# Patient Record
Sex: FEMALE | NOT HISPANIC OR LATINO | Employment: OTHER | URBAN - METROPOLITAN AREA
[De-identification: names, ages, dates, MRNs, and addresses within clinical notes are randomized per-mention and may not be internally consistent; named-entity substitution may affect disease eponyms.]

---

## 2017-01-30 ENCOUNTER — TELEPHONE (OUTPATIENT)
Dept: INTERNAL MEDICINE | Facility: MEDICAL CENTER | Age: 67
End: 2017-01-30

## 2017-01-30 DIAGNOSIS — E11.9 DIABETES MELLITUS WITHOUT COMPLICATION (HCC): ICD-10-CM

## 2017-01-30 NOTE — TELEPHONE ENCOUNTER
----- Message from Marilee Pike sent at 1/30/2017 11:47 AM PST -----  Regarding: Lanre pt  Contact: [phone]  Needs a Rx for a glucose monitor/test strips/ lancets to test daily blood sugar levels. Dr. De Dios asked her to monitor her sugar levels but never prescribed her a machine to test. She uses [facility] on [address]/ [place].

## 2017-03-07 ENCOUNTER — TELEPHONE (OUTPATIENT)
Dept: INTERNAL MEDICINE | Facility: MEDICAL CENTER | Age: 67
End: 2017-03-07

## 2017-03-07 DIAGNOSIS — E11.9 DIABETES MELLITUS WITHOUT COMPLICATION (HCC): ICD-10-CM

## 2017-03-07 NOTE — TELEPHONE ENCOUNTER
----- Message from Shama Beckwith sent at 3/7/2017 10:54 AM PST -----  Regarding: Dr De Dios Pt; EEG Order and Diabetic Machine and Supplies  Contact: [phone]  1. Caller Name: Tonya                    2. Call Back Number: [phone]    3. Patient PCP: Dr De Dios    4. What is the patient requesting: Pt states that at her last visit in Nov Dr De Dios wanted her to have an EEG but there is no order. Also she was diagnosed as being diabetic and was told her machine and supplies would be sent to [facility] but they never were. She was supposed to log her levels. Please verify and call pt. She has an appt on 5/3.     5. Does patient need immediate contact: No

## 2017-03-07 NOTE — TELEPHONE ENCOUNTER
Looks like patient had blood pressure monitoring kit sent to pharmacy 1 month ago. Re-sent another prescription

## 2017-03-13 RX ORDER — TRAZODONE HYDROCHLORIDE 50 MG/1
TABLET ORAL
Qty: 30 TAB | Refills: 0 | Status: SHIPPED | OUTPATIENT
Start: 2017-03-13 | End: 2017-04-08 | Stop reason: SDUPTHER

## 2017-03-13 NOTE — TELEPHONE ENCOUNTER
Last seen: 11/10/16 by Dr. De Dios  Next appt: 05/03/17 with Dr. De Dios    Was the patient seen in the last year in this department? Yes   Does patient have an active prescription for medications requested? No   Received Request Via: Pharmacy

## 2017-04-03 NOTE — TELEPHONE ENCOUNTER
Last seen: 11/10/16 by Dr. De Dios  Next appt: 5/3/17 with Dr. De Dios    Was the patient seen in the last year in this department? Yes   Does patient have an active prescription for medications requested? No   Received Request Via: Pharmacy

## 2017-04-04 RX ORDER — OMEPRAZOLE 40 MG/1
40 CAPSULE, DELAYED RELEASE ORAL DAILY
Qty: 90 CAP | Refills: 1 | Status: SHIPPED | OUTPATIENT
Start: 2017-04-04 | End: 2017-05-02 | Stop reason: SDUPTHER

## 2017-04-24 NOTE — TELEPHONE ENCOUNTER
Last seen: 11/10/16 by Dr. De Dios  Next appt: 05/01/17 with Dr. De Dios    Was the patient seen in the last year in this department? Yes   Does patient have an active prescription for medications requested? No   Received Request Via: Pharmacy

## 2017-05-01 ENCOUNTER — OFFICE VISIT (OUTPATIENT)
Dept: INTERNAL MEDICINE | Facility: MEDICAL CENTER | Age: 67
End: 2017-05-01
Payer: MEDICARE

## 2017-05-01 VITALS
OXYGEN SATURATION: 96 % | TEMPERATURE: 98.5 F | BODY MASS INDEX: 26.98 KG/M2 | HEART RATE: 84 BPM | HEIGHT: 64 IN | RESPIRATION RATE: 20 BRPM | SYSTOLIC BLOOD PRESSURE: 140 MMHG | WEIGHT: 158 LBS | DIASTOLIC BLOOD PRESSURE: 80 MMHG

## 2017-05-01 DIAGNOSIS — F51.01 PRIMARY INSOMNIA: ICD-10-CM

## 2017-05-01 DIAGNOSIS — E11.9 TYPE 2 DIABETES MELLITUS WITHOUT COMPLICATION, WITHOUT LONG-TERM CURRENT USE OF INSULIN (HCC): ICD-10-CM

## 2017-05-01 DIAGNOSIS — E03.9 HYPOTHYROIDISM, UNSPECIFIED TYPE: ICD-10-CM

## 2017-05-01 DIAGNOSIS — E78.5 DYSLIPIDEMIA: ICD-10-CM

## 2017-05-01 DIAGNOSIS — G43.909 MIGRAINE WITHOUT STATUS MIGRAINOSUS, NOT INTRACTABLE, UNSPECIFIED MIGRAINE TYPE: ICD-10-CM

## 2017-05-01 DIAGNOSIS — I10 ESSENTIAL HYPERTENSION: ICD-10-CM

## 2017-05-01 DIAGNOSIS — K21.9 GASTROESOPHAGEAL REFLUX DISEASE WITHOUT ESOPHAGITIS: ICD-10-CM

## 2017-05-01 PROCEDURE — 99214 OFFICE O/P EST MOD 30 MIN: CPT | Mod: GC | Performed by: INTERNAL MEDICINE

## 2017-05-01 PROCEDURE — 1036F TOBACCO NON-USER: CPT | Mod: GC | Performed by: INTERNAL MEDICINE

## 2017-05-01 PROCEDURE — G8417 CALC BMI ABV UP PARAM F/U: HCPCS | Mod: GC | Performed by: INTERNAL MEDICINE

## 2017-05-01 PROCEDURE — 3014F SCREEN MAMMO DOC REV: CPT | Mod: 8P,GC | Performed by: INTERNAL MEDICINE

## 2017-05-01 PROCEDURE — 3046F HEMOGLOBIN A1C LEVEL >9.0%: CPT | Mod: 8P,GC | Performed by: INTERNAL MEDICINE

## 2017-05-01 PROCEDURE — 4040F PNEUMOC VAC/ADMIN/RCVD: CPT | Mod: GC | Performed by: INTERNAL MEDICINE

## 2017-05-01 PROCEDURE — 3017F COLORECTAL CA SCREEN DOC REV: CPT | Mod: GC | Performed by: INTERNAL MEDICINE

## 2017-05-01 PROCEDURE — G8432 DEP SCR NOT DOC, RNG: HCPCS | Mod: GC | Performed by: INTERNAL MEDICINE

## 2017-05-01 PROCEDURE — 1101F PT FALLS ASSESS-DOCD LE1/YR: CPT | Mod: GC | Performed by: INTERNAL MEDICINE

## 2017-05-01 ASSESSMENT — PATIENT HEALTH QUESTIONNAIRE - PHQ9: CLINICAL INTERPRETATION OF PHQ2 SCORE: 0

## 2017-05-01 NOTE — MR AVS SNAPSHOT
"Antonietazabejuliet Estes   2017 3:00 PM   Office Visit   MRN: [id number]    Department:  [facility] Med - Internal Med   Dept Phone:  [phone]    Description:  Female : 1950   Provider:  Chad De Dios M.D.           Reason for Visit     Cough refills, cough at night worsen, difficulty breathing, rash neck, arms.       Allergies as of 2017     No Known Allergies      You were diagnosed with     DM (diabetes mellitus screen)   [915069]       Essential hypertension   [6599459]       Dyslipidemia   [673098]         Vital Signs     Blood Pressure Pulse Temperature Respirations Height Weight    140/80 mmHg 84 36.9 °C (98.5 °F) 20 1.626 m (5' 4\") 71.668 kg (158 lb)    Body Mass Index Oxygen Saturation Smoking Status             27.11 kg/m2 96% Never Smoker          Basic Information     Date Of Birth Sex Race Ethnicity Preferred Language    1950 Female  or  Non- English      Your appointments     May 03, 2017 10:00 AM   Established Patient with Chad De Dios M.D.   [facility] Med - Internal Medicine (--)    [address] [id number] [phone]           You will be receiving a confirmation call a few days before your appointment from our automated call confirmation system.              Problem List              ICD-10-CM Priority Class Noted - Resolved    Retinal detachment H33.20   2013 - Present      Health Maintenance        Date Due Completion Dates    DIABETES MONOFILAMENT / LE EXAM 1950 ---    RETINAL SCREENING 1966 ---    IMM DTaP/Tdap/Td Vaccine (1 - Tdap) 1967 ---    PAP SMEAR 1969 ---    MAMMOGRAM 1988 ---    IMM ZOSTER VACCINE 2008 ---    BONE DENSITY 2013 ---    FASTING LIPID PROFILE 8/10/2014 8/10/2013    A1C SCREENING 3/15/2017 9/15/2016, 2013, 8/10/2013    URINE ACR / MICROALBUMIN 9/15/2017 9/15/2016    SERUM CREATININE 9/15/2017 9/15/2016, 3/18/2015, 8/10/2013, 2013    IMM PNEUMOCOCCAL " 65+ (ADULT) LOW/MEDIUM RISK SERIES (2 of 2 - PPSV23) 11/10/2017 11/10/2016    COLONOSCOPY 10/20/2019 10/20/2009            Current Immunizations     13-VALENT PCV PREVNAR 11/10/2016  2:08 PM    INFLUENZA VACCINE H1N1 7/27/2010    Influenza Vaccine Adult HD 10/10/2016    Influenza Vaccine Quad Inj (Pf) 11/18/2015, 10/7/2014    Influenza Vaccine Quad Inj (Preserved) 11/7/2013, 10/7/2010      Below and/or attached are the medications your provider expects you to take. Review all of your home medications and newly ordered medications with your provider and/or pharmacist. Follow medication instructions as directed by your provider and/or pharmacist. Please keep your medication list with you and share with your provider. Update the information when medications are discontinued, doses are changed, or new medications (including over-the-counter products) are added; and carry medication information at all times in the event of emergency situations     Allergies:  No Known Allergies          Medications  Valid as of: May 01, 2017 -  3:46 PM    Generic Name Brand Name Tablet Size Instructions for use    Blood Glucose Monitoring Suppl (Device) Blood Glucose Monitoring Suppl  Meter: Freestyle glucometer. Sig. Use as directed for blood sugar monitoring twice daily.        Blood Glucose Monitoring Suppl (Misc) Blood Glucose Monitoring Suppl SUPPLIES Test strips order: Test strips for Abbott Freestyle Lite meter. Sig: use bid and prn ssx high or low sugar        Blood Glucose Monitoring Suppl (Misc) Blood Glucose Monitoring Suppl SUPPLIES Lancets order: Lancets for Abbott Freestyle Lite meter. Sig: use bid and prn ssx high or low sugar        Blood Glucose Monitoring Suppl (Misc) Blood Glucose Monitoring Suppl SUPPLIES Test strips order: Test strips  for One Touch Ultra 2 meter. Sig: check blood sugars twice daily DX:E11.9        Blood Glucose Monitoring Suppl (Device) Blood Glucose Monitoring Suppl  Meter: Dispense One touch Ultra  2 . Sig. Check blood sugars twice daily DX:E11.9        Blood Glucose Monitoring Suppl (Misc) Blood Glucose Monitoring Suppl SUPPLIES Lancets order: Lancets  strips for One Touch Ultra 2 meter. Sig: check blood sugars twice daily        Blood Glucose Monitoring Suppl (Kit) FREESTYLE  1 Each by Does not apply route Once for 1 dose.        Cimetidine (Tab) TAGAMET 800 MG Take 400 mg by mouth every day.        Ibuprofen (Tab) MOTRIN 600 MG Take 1 Tab by mouth every 8 hours as needed.        Levothyroxine Sodium (Tab) SYNTHROID 50 MCG TAKE 1 TAB BY MOUTH EVERY DAY.        Levothyroxine Sodium (Tab) SYNTHROID 50 MCG Take 1 Tab by mouth every day.        Losartan Potassium-HCTZ (Tab) HYZAAR 50-12.5 MG Take 1 Tab by mouth every day.        Lovastatin (Tab) MEVACOR 20 MG Take 20 mg by mouth every evening.        MetFORMIN HCl (Tab) GLUCOPHAGE 1000 MG Take 1 Tab by mouth 2 times a day. 0.5 TAB DAILY        MetFORMIN HCl (Tab) GLUCOPHAGE 1000 MG TAKE 1 TAB BY MOUTH 2 TIMES A DAY.        MetFORMIN HCl (Tab) GLUCOPHAGE 1000 MG TAKE 1 TAB BY MOUTH 2 TIMES A DAY. 0.5 TAB DAILY        Montelukast Sodium (Tab) SINGULAIR 10 MG Take 10 mg by mouth every day.        Multiple Vitamin (Tab) THERAGRAN  Take 1 Tab by mouth every day.          Omeprazole (CAPSULE DELAYED RELEASE) PRILOSEC 40 MG TAKE 1 CAPSULE BY MOUTH EVERY MORNING, 30 MINUTES BEFORE 1ST MEAL OF DAY        Omeprazole (CAPSULE DELAYED RELEASE) PRILOSEC 40 MG Take 1 Cap by mouth every day.        PrednisoLONE Acetate (Suspension) PRED FORTE 1 % INSTILL 1 DROP INTO BOTH EYES 4 TIMES A DAY AS DIRECTED        Propranolol HCl (Tab) INDERAL 40 MG TAKE 1 TABLET BY ORAL ROUTE 2 TIMES EVERY DAY        SUMAtriptan Succinate (Tab) IMITREX 100 MG Take 1 Tab by mouth Once PRN for Migraine for up to 1 dose.        TraZODone HCl (Tab) DESYREL 50 MG TAKE 1 TAB BY MOUTH 1 TIME DAILY AS NEEDED FOR SLEEP (AT NIGHT).        .                 Medicines prescribed today were sent to:      [id number] - [place], [place] - [address]    [address] [place] [address]    Phone: [phone] Fax: [phone]    Open 24 Hours?: No      Medication refill instructions:       If your prescription bottle indicates you have medication refills left, it is not necessary to call your provider’s office. Please contact your pharmacy and they will refill your medication.    If your prescription bottle indicates you do not have any refills left, you may request refills at any time through one of the following ways: The online Seventymm system (except Urgent Care), by calling your provider’s office, or by asking your pharmacy to contact your provider’s office with a refill request. Medication refills are processed only during regular business hours and may not be available until the next business day. Your provider may request additional information or to have a follow-up visit with you prior to refilling your medication.   *Please Note: Medication refills are assigned a new Rx number when refilled electronically. Your pharmacy may indicate that no refills were authorized even though a new prescription for the same medication is available at the pharmacy. Please request the medicine by name with the pharmacy before contacting your provider for a refill.        Your To Do List     Future Labs/Procedures Complete By Expires    CBC WITH DIFFERENTIAL  As directed 5/1/2018    COMP METABOLIC PANEL (For Serum Creatinine Topic, choose 1 only)  As directed 5/1/2018    HEMOGLOBIN A1C (For A1C Every 6 Months Topic)  As directed 5/1/2018    Comments:    HEMOGLOBIN A1C   [unfilled]    LIPID PROFILE  As directed 5/1/2018         Seventymm Access Code: [email or web address]  Expires: 5/31/2017  3:46 PM    Seventymm  A secure, online tool to manage your health information     [email or web address]’s Seventymm® is a secure, online tool that connects you to your personalized health information from the privacy of your home -- day or night -  making it very easy for you to manage your healthcare. Once the activation process is completed, you can even access your medical information using the iCrimefighter bean, which is available for free in the Apple Bean store or Google Play store.     iCrimefighter provides the following levels of access (as shown below):   My Chart Features   [facility] Doctor [facility]  Specialists [facility]  Urgent  Care [facility]  Primary Care  Doctor   Email your healthcare team securely and privately 24/7 X X X    Manage appointments: schedule your next appointment; view details of past/upcoming appointments X      Request prescription refills. X      View recent personal medical records, including lab and immunizations X X X X   View health record, including health history, allergies, medications X X X X   Read reports about your outpatient visits, procedures, consult and ER notes X X X X   See your discharge summary, which is a recap of your hospital and/or ER visit that includes your diagnosis, lab results, and care plan. X X       How to register for iCrimefighter:  1. Go to  [email or web address].  2. Click on the Sign Up Now box, which takes you to the New Member Sign Up page. You will need to provide the following information:  a. Enter your iCrimefighter Access Code exactly as it appears at the top of this page. (You will not need to use this code after you’ve completed the sign-up process. If you do not sign up before the expiration date, you must request a new code.)   b. Enter your date of birth.   c. Enter your home email address.   d. Click Submit, and follow the next screen’s instructions.  3. Create a iCrimefighter ID. This will be your iCrimefighter login ID and cannot be changed, so think of one that is secure and easy to remember.  4. Create a iCrimefighter password. You can change your password at any time.  5. Enter your Password Reset Question and Answer. This can be used at a later time if you forget your password.   6. Enter your e-mail  address. This allows you to receive e-mail notifications when new information is available in [employer].  7. Click Sign Up. You can now view your health information.    For assistance activating your [employer] account, call [phone]

## 2017-05-02 PROBLEM — E03.9 HYPOTHYROIDISM: Status: ACTIVE | Noted: 2017-05-02

## 2017-05-02 PROBLEM — K21.9 GERD (GASTROESOPHAGEAL REFLUX DISEASE): Status: ACTIVE | Noted: 2017-05-02

## 2017-05-02 PROBLEM — E78.5 DYSLIPIDEMIA: Status: ACTIVE | Noted: 2017-05-02

## 2017-05-02 PROBLEM — I10 ESSENTIAL HYPERTENSION: Status: ACTIVE | Noted: 2017-05-02

## 2017-05-02 PROBLEM — Z13.1 DM (DIABETES MELLITUS SCREEN): Status: ACTIVE | Noted: 2017-05-02

## 2017-05-02 PROBLEM — F51.01 PRIMARY INSOMNIA: Status: ACTIVE | Noted: 2017-05-02

## 2017-05-02 PROBLEM — G43.909 MIGRAINE WITHOUT STATUS MIGRAINOSUS, NOT INTRACTABLE: Status: ACTIVE | Noted: 2017-05-02

## 2017-05-02 RX ORDER — OMEPRAZOLE 40 MG/1
40 CAPSULE, DELAYED RELEASE ORAL DAILY
Qty: 90 CAP | Refills: 1 | Status: SHIPPED | OUTPATIENT
Start: 2017-05-02 | End: 2017-07-21

## 2017-05-02 RX ORDER — PROPRANOLOL HYDROCHLORIDE 40 MG/1
40 TABLET ORAL DAILY
Qty: 90 TAB | Refills: 6 | Status: SHIPPED | OUTPATIENT
Start: 2017-05-02 | End: 2017-07-21 | Stop reason: SDUPTHER

## 2017-05-02 RX ORDER — SUMATRIPTAN 100 MG/1
100 TABLET, FILM COATED ORAL
Qty: 10 TAB | Refills: 3 | Status: SHIPPED | OUTPATIENT
Start: 2017-05-02 | End: 2017-07-21 | Stop reason: SDUPTHER

## 2017-05-02 ASSESSMENT — ENCOUNTER SYMPTOMS
TINGLING: 0
CHILLS: 0
BLURRED VISION: 0
HEMOPTYSIS: 0
PALPITATIONS: 0
DIZZINESS: 0
HEADACHES: 1
HEARTBURN: 1
SPEECH CHANGE: 0
SENSORY CHANGE: 0
COUGH: 0
MYALGIAS: 0
CONSTIPATION: 0
FEVER: 0
NAUSEA: 0
DIARRHEA: 0
DEPRESSION: 0
VOMITING: 0
BLOOD IN STOOL: 0

## 2017-05-02 NOTE — PROGRESS NOTES
Established Patient    Tonya presents today with the following:    CC: For f/u of chronic medical issue      HPI:    Hypertension: Denied any alarm symptoms, compliance with the medication. BP usually run in 130s at home .      Headache: Per patient that she saw a neurologist for headache. They tried Botox but it did not help her . Pt lost f/u with neurology . Pt ran out imitrex few months ago  .Headache was uncontrolled on this visit because she ran out medication .    Insomnia, was on trazodone      Hypothyroidism: Denies symptom of hypothyroidism, compliance with the medication.    Diabetes: Compliance with the medication, on low-carb diet. Checking finger stick at home and it usually run in 120s to 130s .Pt had eye  exam in 8/16 and per pt it was normal .     GERD: Denies any alarm symptoms. Pt reports of heart burning on this visit . Pt ran out PPI .    Pt states that she had DEXA scan three year ago and was normal. Colonoscopy three years ago and per pt it was wnl. Per pt mammogram this year and was normal . Pt states that she had hysterectomy few months ago  . Pt got flu shot last year  .    DLD: On statin , denies any side affect associated with med        Patient Active Problem List    Diagnosis Date Noted   • DM (diabetes mellitus screen) 05/02/2017   • Essential hypertension 05/02/2017   • Migraine without status migrainosus, not intractable 05/02/2017   • Dyslipidemia 05/02/2017   • Retinal detachment 02/12/2013       Current Outpatient Prescriptions   Medication Sig Dispense Refill   • metformin (GLUCOPHAGE) 1000 MG tablet TAKE 1 TAB BY MOUTH 2 TIMES A DAY. 0.5 TAB DAILY 180 Tab 0   • trazodone (DESYREL) 50 MG Tab TAKE 1 TAB BY MOUTH 1 TIME DAILY AS NEEDED FOR SLEEP (AT NIGHT). 90 Tab 0   • omeprazole (PRILOSEC) 40 MG delayed-release capsule Take 1 Cap by mouth every day. 90 Cap 1   • Blood Glucose Monitoring Suppl SUPPLIES Misc Test strips order: Test strips  for One Touch Ultra 2 meter. Sig:  check blood sugars twice daily DX:E11.9 200 Each 2   • Blood Glucose Monitoring Suppl Device Meter: Dispense One touch Ultra 2 . Sig. Check blood sugars twice daily DX:E11.9 1 Device 2   • Blood Glucose Monitoring Suppl SUPPLIES Misc Lancets order: Lancets  strips for One Touch Ultra 2 meter. Sig: check blood sugars twice daily 200 Each 2   • metformin (GLUCOPHAGE) 1000 MG tablet Take 1 Tab by mouth 2 times a day. 0.5 TAB DAILY 60 Tab 3   • levothyroxine (SYNTHROID) 50 MCG Tab TAKE 1 TAB BY MOUTH EVERY DAY. 90 Tab 3   • levothyroxine (SYNTHROID) 50 MCG Tab Take 1 Tab by mouth every day. 30 Tab 0   • losartan-hydrochlorothiazide (HYZAAR) 50-12.5 MG per tablet Take 1 Tab by mouth every day. 30 Tab 5   • sumatriptan (IMITREX) 100 MG tablet Take 1 Tab by mouth Once PRN for Migraine for up to 1 dose. 10 Tab 3   • propranolol (INDERAL) 40 MG Tab TAKE 1 TABLET BY ORAL ROUTE 2 TIMES EVERY DAY  6   • montelukast (SINGULAIR) 10 MG Tab Take 10 mg by mouth every day.     • ibuprofen (MOTRIN) 600 MG Tab Take 1 Tab by mouth every 8 hours as needed. 30 Tab 0   • lovastatin (MEVACOR) 20 MG TABS Take 20 mg by mouth every evening.     • cimetidine (TAGAMET) 800 MG tablet Take 400 mg by mouth every day.     • multivitamin (THERAGRAN) TABS Take 1 Tab by mouth every day.       • omeprazole (PRILOSEC) 40 MG delayed-release capsule TAKE 1 CAPSULE BY MOUTH EVERY MORNING, 30 MINUTES BEFORE 1ST MEAL OF DAY 30 Cap 3   • Blood Glucose Monitoring Suppl Device Meter: Freestyle glucometer. Sig. Use as directed for blood sugar monitoring twice daily. 1 Device 0   • Blood Glucose Monitoring Suppl SUPPLIES Misc Test strips order: Test strips for Abbott Freestyle Lite meter. Sig: use bid and prn ssx high or low sugar 200 Strip 11   • Blood Glucose Monitoring Suppl SUPPLIES Misc Lancets order: Lancets for Abbott Freestyle Lite meter. Sig: use bid and prn ssx high or low sugar 200 Each 11   • metformin (GLUCOPHAGE) 1000 MG tablet TAKE 1 TAB BY MOUTH 2  "TIMES A DAY. 60 Tab 6   • prednisoLONE acetate (PRED FORTE) 1 % Suspension INSTILL 1 DROP INTO BOTH EYES 4 TIMES A DAY AS DIRECTED  1     Current Facility-Administered Medications   Medication Dose Route Frequency Provider Last Rate Last Dose   • acetaminophen (TYLENOL) tablet 750 mg  750 mg Oral Q6HRS PRN Chad De Dios M.D.           ROS: As per HPI. Additional pertinent symptoms as noted below.  Review of Systems   Constitutional: Positive for malaise/fatigue. Negative for fever and chills.   Eyes: Negative for blurred vision.   Respiratory: Negative for cough and hemoptysis.    Cardiovascular: Negative for chest pain and palpitations.   Gastrointestinal: Positive for heartburn. Negative for nausea, vomiting, diarrhea, constipation, blood in stool and melena.   Musculoskeletal: Negative for myalgias.   Neurological: Positive for headaches. Negative for dizziness, tingling, sensory change and speech change.   Psychiatric/Behavioral: Negative for depression and suicidal ideas.       /80 mmHg  Pulse 84  Temp(Src) 36.9 °C (98.5 °F)  Resp 20  Ht 1.626 m (5' 4\")  Wt 71.668 kg (158 lb)  BMI 27.11 kg/m2  SpO2 96%    Physical Exam   Constitutional:  oriented to person, place, and time. No distress.   Eyes:  No scleral icterus.  Neck: Neck supple.   Cardiovascular: Normal rate, regular rhythm and normal heart sound.  No murmur heard.  Pulmonary/Chest: Breath sounds normal. Chest wall is not dull to percussion.   Musculoskeletal:   no edema.   Abd: Soft and non tender   Neurological: alert and oriented to person, place, and time.   Skin: No cyanosis.      Assessment and Plan    1. Type 2 diabetes mellitus without complication  Patient had a eye exam on 8/29/2016 and per patient she had diabetic retinopathy. Patient is compliant with the medication. Blood sugar usually runs in 130s.A1C in 9/16 was  6.2%. . Encourage for exercise . Ordered repeat A1C .    2.Hypertension  Control, continue current " medication.Advice to bring log on next appointment . Encourage for exercise and low salt diet     3.GERD  Denies any arm symptoms so holding endoscopy. Continue current medication.     4.Hypothyroidism  Continue levothyroxine,TSH in 9/16 was  1.110     5.Migraine  Uncontrolled on this visit due to ran out medication .Continue propranolol, Imitrex and ibuprofen. Cont f/u with neurology . Pt was advised that do not take more than 9 tablet( imitrex ) per months      6.Insomnia  Cont   trazodone    7.DLD  -on statin , will check lipid panel .Encourage for exercise

## 2017-05-03 ENCOUNTER — APPOINTMENT (OUTPATIENT)
Dept: INTERNAL MEDICINE | Facility: MEDICAL CENTER | Age: 67
End: 2017-05-03
Payer: MEDICAID

## 2017-06-23 NOTE — TELEPHONE ENCOUNTER
Was the patient seen in the last year in this department? Yes    Last seen: 05/01/17 by Dr. De Dios  Next appt: NONE      Does patient have an active prescription for medications requested? No     Received Request Via: Pharmacy

## 2017-07-07 RX ORDER — TRAZODONE HYDROCHLORIDE 50 MG/1
TABLET ORAL
Qty: 90 TAB | Refills: 0 | Status: SHIPPED | OUTPATIENT
Start: 2017-07-07 | End: 2017-07-21 | Stop reason: SDUPTHER

## 2017-07-21 ENCOUNTER — OFFICE VISIT (OUTPATIENT)
Dept: INTERNAL MEDICINE | Facility: MEDICAL CENTER | Age: 67
End: 2017-07-21
Payer: MEDICARE

## 2017-07-21 VITALS
HEART RATE: 72 BPM | TEMPERATURE: 97.1 F | DIASTOLIC BLOOD PRESSURE: 80 MMHG | OXYGEN SATURATION: 96 % | HEIGHT: 64 IN | BODY MASS INDEX: 26.02 KG/M2 | SYSTOLIC BLOOD PRESSURE: 140 MMHG | WEIGHT: 152.4 LBS

## 2017-07-21 DIAGNOSIS — K92.1 MELENA: ICD-10-CM

## 2017-07-21 DIAGNOSIS — K06.9 DISEASE OF GINGIVA DUE TO RECURRENT ORAL HERPES SIMPLEX VIRUS (HSV) INFECTION: ICD-10-CM

## 2017-07-21 DIAGNOSIS — R89.9 ABNORMAL LABORATORY TEST: ICD-10-CM

## 2017-07-21 DIAGNOSIS — E03.9 HYPOTHYROIDISM, UNSPECIFIED TYPE: ICD-10-CM

## 2017-07-21 DIAGNOSIS — E78.5 DYSLIPIDEMIA: ICD-10-CM

## 2017-07-21 DIAGNOSIS — E88.819 INSULIN RESISTANCE: ICD-10-CM

## 2017-07-21 DIAGNOSIS — E11.9 DIABETES MELLITUS WITHOUT COMPLICATION (HCC): ICD-10-CM

## 2017-07-21 DIAGNOSIS — F51.01 PRIMARY INSOMNIA: ICD-10-CM

## 2017-07-21 DIAGNOSIS — K21.00 GASTROESOPHAGEAL REFLUX DISEASE WITH ESOPHAGITIS: ICD-10-CM

## 2017-07-21 DIAGNOSIS — Z12.39 ENCOUNTER FOR OTHER SCREENING FOR MALIGNANT NEOPLASM OF BREAST: ICD-10-CM

## 2017-07-21 DIAGNOSIS — I10 ESSENTIAL HYPERTENSION: ICD-10-CM

## 2017-07-21 DIAGNOSIS — G43.909 MIGRAINE WITHOUT STATUS MIGRAINOSUS, NOT INTRACTABLE, UNSPECIFIED MIGRAINE TYPE: ICD-10-CM

## 2017-07-21 DIAGNOSIS — B00.9 DISEASE OF GINGIVA DUE TO RECURRENT ORAL HERPES SIMPLEX VIRUS (HSV) INFECTION: ICD-10-CM

## 2017-07-21 DIAGNOSIS — Z12.39 SCREENING FOR BREAST CANCER: ICD-10-CM

## 2017-07-21 PROCEDURE — 99214 OFFICE O/P EST MOD 30 MIN: CPT | Mod: GC | Performed by: INTERNAL MEDICINE

## 2017-07-21 RX ORDER — ACYCLOVIR 50 MG/G
OINTMENT TOPICAL
Qty: 1 TUBE | Refills: 0 | Status: SHIPPED | OUTPATIENT
Start: 2017-07-21 | End: 2017-07-27

## 2017-07-21 RX ORDER — LOVASTATIN 20 MG/1
20 TABLET ORAL
Qty: 30 TAB | Refills: 3 | Status: SHIPPED | OUTPATIENT
Start: 2017-07-21 | End: 2017-08-24 | Stop reason: SINTOL

## 2017-07-21 RX ORDER — OMEPRAZOLE 40 MG/1
40 CAPSULE, DELAYED RELEASE ORAL DAILY
Qty: 30 CAP | Refills: 3 | Status: SHIPPED | OUTPATIENT
Start: 2017-07-21 | End: 2018-03-21

## 2017-07-21 RX ORDER — TRAZODONE HYDROCHLORIDE 50 MG/1
TABLET ORAL
Qty: 30 TAB | Refills: 3 | Status: SHIPPED | OUTPATIENT
Start: 2017-07-21 | End: 2017-10-30

## 2017-07-21 RX ORDER — SUMATRIPTAN 100 MG/1
100 TABLET, FILM COATED ORAL
Qty: 20 TAB | Refills: 3 | Status: SHIPPED | OUTPATIENT
Start: 2017-07-21 | End: 2017-10-30 | Stop reason: SDUPTHER

## 2017-07-21 RX ORDER — CIMETIDINE 400 MG/1
800 TABLET, FILM COATED ORAL DAILY
COMMUNITY
End: 2017-07-21

## 2017-07-21 RX ORDER — LOSARTAN POTASSIUM AND HYDROCHLOROTHIAZIDE 12.5; 5 MG/1; MG/1
1 TABLET ORAL
Qty: 90 TAB | Refills: 0 | Status: SHIPPED | OUTPATIENT
Start: 2017-07-21 | End: 2018-07-30 | Stop reason: SDUPTHER

## 2017-07-21 RX ORDER — LEVOTHYROXINE SODIUM 0.05 MG/1
50 TABLET ORAL
Qty: 30 TAB | Refills: 0 | Status: SHIPPED | OUTPATIENT
Start: 2017-07-21 | End: 2017-08-21 | Stop reason: SDUPTHER

## 2017-07-21 RX ORDER — CIMETIDINE 800 MG
800 TABLET ORAL 2 TIMES DAILY
Qty: 60 TAB | Refills: 3 | Status: SHIPPED | OUTPATIENT
Start: 2017-07-21 | End: 2017-08-24

## 2017-07-21 RX ORDER — PROPRANOLOL HYDROCHLORIDE 40 MG/1
40 TABLET ORAL 2 TIMES DAILY
Qty: 60 TAB | Refills: 3 | Status: SHIPPED | OUTPATIENT
Start: 2017-07-21 | End: 2017-09-25 | Stop reason: SDUPTHER

## 2017-07-21 NOTE — MR AVS SNAPSHOT
"Antonietazabejuliet Estes   2017 8:00 AM   Office Visit   MRN: [id number]    Department:  [facility] - Internal Med   Dept Phone:  [phone]    Description:  Female : 1950   Provider:  Adriana Almaraz M.D.           Reason for Visit     Establish Care Refill  ALL Rx's except eye drops     Cough     Itching Neck     Pharyngitis           Allergies as of 2017     No Known Allergies      Vital Signs     Blood Pressure Pulse Temperature Height Weight Body Mass Index    140/80 mmHg 72 36.2 °C (97.1 °F) 1.626 m (5' 4\") 69.128 kg (152 lb 6.4 oz) 26.15 kg/m2    Oxygen Saturation Smoking Status                96% Never Smoker           Basic Information     Date Of Birth Sex Race Ethnicity Preferred Language    1950 Female  or  Non- English      Your appointments     Aug 24, 2017  3:15 PM   Established Patient with Adriana Almaraz M.D.   [facility] Med - Internal Medicine (--)    [address] [place] [id number] [phone]           You will be receiving a confirmation call a few days before your appointment from our automated call confirmation system.              Problem List              ICD-10-CM Priority Class Noted - Resolved    Retinal detachment H33.20   2013 - Present    DM (diabetes mellitus screen) Z13.1   2017 - Present    Essential hypertension I10   2017 - Present    Migraine without status migrainosus, not intractable G43.909   2017 - Present    Dyslipidemia E78.5   2017 - Present    GERD (gastroesophageal reflux disease) K21.9   2017 - Present    Primary insomnia F51.01   2017 - Present    Hypothyroidism E03.9   2017 - Present      Health Maintenance        Date Due Completion Dates    DIABETES MONOFILAMENT / LE EXAM 1950 ---    RETINAL SCREENING 1966 ---    IMM DTaP/Tdap/Td Vaccine (1 - Tdap) 1967 ---    PAP SMEAR 1969 ---    MAMMOGRAM 1988 ---    IMM ZOSTER VACCINE 2008 ---   " BONE DENSITY 5/1/2013 ---    FASTING LIPID PROFILE 8/10/2014 8/10/2013    A1C SCREENING 3/15/2017 9/15/2016, 11/11/2013, 8/10/2013    IMM INFLUENZA (1) 9/1/2017 10/10/2016, 11/18/2015, 10/7/2014, 11/7/2013, 10/7/2010    URINE ACR / MICROALBUMIN 9/15/2017 9/15/2016    SERUM CREATININE 9/15/2017 9/15/2016, 3/18/2015, 8/10/2013, 2/12/2013    IMM PNEUMOCOCCAL 65+ (ADULT) LOW/MEDIUM RISK SERIES (2 of 2 - PPSV23) 11/10/2017 11/10/2016    COLONOSCOPY 10/20/2019 10/20/2009            Current Immunizations     13-VALENT PCV PREVNAR 11/10/2016  2:08 PM    INFLUENZA VACCINE H1N1 7/27/2010    Influenza Vaccine Adult HD 10/10/2016    Influenza Vaccine Quad Inj (Pf) 11/18/2015, 10/7/2014    Influenza Vaccine Quad Inj (Preserved) 11/7/2013, 10/7/2010      Below and/or attached are the medications your provider expects you to take. Review all of your home medications and newly ordered medications with your provider and/or pharmacist. Follow medication instructions as directed by your provider and/or pharmacist. Please keep your medication list with you and share with your provider. Update the information when medications are discontinued, doses are changed, or new medications (including over-the-counter products) are added; and carry medication information at all times in the event of emergency situations     Allergies:  No Known Allergies          Medications  Valid as of: July 21, 2017 -  9:35 AM    Generic Name Brand Name Tablet Size Instructions for use    Blood Glucose Monitoring Suppl (Device) Blood Glucose Monitoring Suppl  Meter: Freestyle glucometer. Sig. Use as directed for blood sugar monitoring twice daily.        Blood Glucose Monitoring Suppl (Misc) Blood Glucose Monitoring Suppl SUPPLIES Test strips order: Test strips for Abbott Freestyle Lite meter. Sig: use bid and prn ssx high or low sugar        Blood Glucose Monitoring Suppl (Misc) Blood Glucose Monitoring Suppl SUPPLIES Lancets order: Lancets for Abbott  Freestyle Lite meter. Sig: use bid and prn ssx high or low sugar        Blood Glucose Monitoring Suppl (Misc) Blood Glucose Monitoring Suppl SUPPLIES Test strips order: Test strips  for One Touch Ultra 2 meter. Sig: check blood sugars twice daily DX:E11.9        Blood Glucose Monitoring Suppl (Device) Blood Glucose Monitoring Suppl  Meter: Dispense One touch Ultra 2 . Sig. Check blood sugars twice daily DX:E11.9        Blood Glucose Monitoring Suppl (Misc) Blood Glucose Monitoring Suppl SUPPLIES Lancets order: Lancets  strips for One Touch Ultra 2 meter. Sig: check blood sugars twice daily        Cimetidine (Tab) TAGAMET 800 MG Take 400 mg by mouth every day.        Cimetidine (Tab) TAGAMET 400 MG Take 800 mg by mouth every day.        Ibuprofen (Tab) MOTRIN 600 MG Take 1 Tab by mouth every 8 hours as needed.        Levothyroxine Sodium (Tab) SYNTHROID 50 MCG TAKE 1 TAB BY MOUTH EVERY DAY.        Levothyroxine Sodium (Tab) SYNTHROID 50 MCG Take 1 Tab by mouth every day.        Losartan Potassium-HCTZ (Tab) HYZAAR 50-12.5 MG TAKE 1 TAB BY MOUTH EVERY DAY.        Lovastatin (Tab) MEVACOR 20 MG Take 20 mg by mouth every evening.        MetFORMIN HCl (Tab) GLUCOPHAGE 1000 MG Take 1 Tab by mouth 2 times a day. 0.5 TAB DAILY        MetFORMIN HCl (Tab) GLUCOPHAGE 1000 MG TAKE 1 TAB BY MOUTH 2 TIMES A DAY.        MetFORMIN HCl (Tab) GLUCOPHAGE 1000 MG TAKE 1 TAB BY MOUTH 2 TIMES A DAY. 0.5 TAB DAILY        MetFORMIN HCl (Tab) GLUCOPHAGE 1000 MG TAKE 1 TAB BY MOUTH 2 TIMES A DAY. 0.5 TAB DAILY        Montelukast Sodium (Tab) SINGULAIR 10 MG Take 10 mg by mouth every day.        Multiple Vitamin (Tab) THERAGRAN  Take 1 Tab by mouth every day.          Omeprazole (CAPSULE DELAYED RELEASE) PRILOSEC 40 MG TAKE 1 CAPSULE BY MOUTH EVERY MORNING, 30 MINUTES BEFORE 1ST MEAL OF DAY        Omeprazole (CAPSULE DELAYED RELEASE) PRILOSEC 40 MG Take 1 Cap by mouth every day.        PrednisoLONE Acetate (Suspension) PRED FORTE 1 % INSTILL  1 DROP INTO BOTH EYES 4 TIMES A DAY AS DIRECTED        Propranolol HCl (Tab) INDERAL 40 MG Take 1 Tab by mouth every day.        SUMAtriptan Succinate (Tab) IMITREX 100 MG Take 1 Tab by mouth Once PRN for Migraine for up to 1 dose.        TraZODone HCl (Tab) DESYREL 50 MG TAKE 1 TAB BY MOUTH 1 TIME DAILY AS NEEDED FOR SLEEP (AT NIGHT).        .                 Medicines prescribed today were sent to:     [facility] - [place], [place] - [address]    [address] [address]    Phone: [phone] Fax: [phone]    Open 24 Hours?: No      Medication refill instructions:       If your prescription bottle indicates you have medication refills left, it is not necessary to call your provider’s office. Please contact your pharmacy and they will refill your medication.    If your prescription bottle indicates you do not have any refills left, you may request refills at any time through one of the following ways: The online [employer] system (except Urgent Care), by calling your provider’s office, or by asking your pharmacy to contact your provider’s office with a refill request. Medication refills are processed only during regular business hours and may not be available until the next business day. Your provider may request additional information or to have a follow-up visit with you prior to refilling your medication.   *Please Note: Medication refills are assigned a new Rx number when refilled electronically. Your pharmacy may indicate that no refills were authorized even though a new prescription for the same medication is available at the pharmacy. Please request the medicine by name with the pharmacy before contacting your provider for a refill.        Instructions    It was a pleasure meeting you today.     For your chronic problems, we will be re-ordering all of your medications. We will be prescribing a new medication for the pain in your gums called Zovirax.    For your acid  reflux, I will reorder your omeprazole and cimetidine. I would also recommend over the counter drugs like those you are using at home (e.g. Tums) and would also recommend that you sleep propped up at night, which may help your cough and wheezing somewhat.     We will also be ordering some labs today, including an A1C to look at your blood sugar control, a lipid test to look at your blood cholesterol levels, a test to look at your kidney and liver function, a test to look at the level of iron in your blood, and a test to look at your thyroid function. We would also like to get a DEXA scan to look at your bone density. At your next visit we can discuss getting your Tdap vaccine that you are due for.    We will also be ordering some referrals for you, including a referral to GI for an upper endoscopy to look inside your throat because you are having trouble swallowing, and a referral for a mammogram. Please follow up with your neurologist and your ophthalmologist about your ongoing migraines and your blurred vision.    We would like to follow up with you at an appointment in 5 weeks.          [employer] Access Code: [email or web address]  Expires: 8/20/2017  9:35 AM    [employer]  A secure, online tool to manage your health information     [employer]® is a secure, online tool that connects you to your personalized health information from the privacy of your home -- day or night - making it very easy for you to manage your healthcare. Once the activation process is completed, you can even access your medical information using the [employer] bean, which is available for free in the [employer] store or Google Play store.     [employer] provides the following levels of access (as shown below):   My Chart Features   [facility] Doctor [facility]  Specialists [facility]  Urgent  Care Non-[facility]  Primary Care  Doctor   Email your healthcare team securely and privately 24/7 X X X    Manage appointments: schedule your next  appointment; view details of past/upcoming appointments X      Request prescription refills. X      View recent personal medical records, including lab and immunizations X X X X   View health record, including health history, allergies, medications X X X X   Read reports about your outpatient visits, procedures, consult and ER notes X X X X   See your discharge summary, which is a recap of your hospital and/or ER visit that includes your diagnosis, lab results, and care plan. X X       How to register for [employer]:  1. Go to  [email or web address].  2. Click on the Sign Up Now box, which takes you to the New Member Sign Up page. You will need to provide the following information:  a. Enter your [employer] Access Code exactly as it appears at the top of this page. (You will not need to use this code after you’ve completed the sign-up process. If you do not sign up before the expiration date, you must request a new code.)   b. Enter your date of birth.   c. Enter your home email address.   d. Click Submit, and follow the next screen’s instructions.  3. Create a [employer] ID. This will be your [employer] login ID and cannot be changed, so think of one that is secure and easy to remember.  4. Create a [employer] password. You can change your password at any time.  5. Enter your Password Reset Question and Answer. This can be used at a later time if you forget your password.   6. Enter your e-mail address. This allows you to receive e-mail notifications when new information is available in [employer].  7. Click Sign Up. You can now view your health information.    For assistance activating your [employer] account, call [phone]

## 2017-07-21 NOTE — PATIENT INSTRUCTIONS
It was a pleasure meeting you today.     For your chronic problems, we will be re-ordering all of your medications. We will be prescribing a new medication for the pain in your gums called Zovirax.    For your acid reflux, I will reorder your omeprazole and cimetidine. I would also recommend over the counter drugs like those you are using at home (e.g. Tums) and would also recommend that you sleep propped up at night, which may help your cough and wheezing somewhat.     We will also be ordering some labs today, including an A1C to look at your blood sugar control, a lipid test to look at your blood cholesterol levels, a test to look at your kidney and liver function, a test to look at the level of iron in your blood, and a test to look at your thyroid function. We would also like to get a DEXA scan to look at your bone density. At your next visit we can discuss getting your Tdap vaccine that you are due for.    We will also be ordering some referrals for you, including a referral to GI for an upper endoscopy to look inside your throat because you are having trouble swallowing, and a referral for a mammogram. Please follow up with your neurologist and your ophthalmologist about your ongoing migraines and your blurred vision.    We would like to follow up with you at an appointment in 5 weeks.

## 2017-07-21 NOTE — PROGRESS NOTES
"Established Patient    CC: Ms. Estes is a 66 y/o F who presents for f/u of multiple health issues.     HPI: Primary concerns today include:     # Gastroesophageal reflux disease with esophagitis: Pt has longstanding GERD with related burning substernal chest pain (not worsened by exertion or eating), worse at night/when lying flat. It is associated with cough and wheezing at night. Concerningly, she now endorses dysphagia and black stools.     # Migraine without status migrainosus, not intractable, unspecified migraine type: Though she states the propranolol/sumatriptan help, she still gets regular migraines. At a previous office visit, she states that she received occipital injections which temporarily reduced her number of migraines, but they have since returned with regularity despite medication.    # Screening for breast cancer: Pt complains of highly tender areas in the superior quadrants of both breasts, as well as firm nontender subdermal nodules in anterior upper arms. Last mammogram at least 2 years ago.     # Oral HSV: States she has been previously dx w/ HSV. States there is a sore on her inferior Right gingival area directly underlying her dentures. This area makes it very painful to eat, and she states she has lost weight since her last clinic visit because of it.     # Hypothyroidism: States she has been \"hot and cold\" lately. Denies hair loss. Endorses unintentional weight loss but attributes it to pain with eating 2/2 Oral HSV. (See below.)    # Insulin resistance: Last A1C was in 2016 and was 6.4. Has not been measuring BG at home because Medicare would not cover her BG strips/equipment under the diagnosis code associated with the order following last visit. Currently taking 500 mg/day metformin. Notably, she endorses increased blurred vision since last office visit.     # Dyslipidemia: currently taking lovastatin 20 mg Qdaily. Has not had lipid profile since 8/10/2013 when it showed , HDL 39, " . Lipid panel was reordered at last office visit and patient states that she had the test drawn at an outside lab, but we could not find results.     # Primary insomnia: currently taking trazodone 50 mg QHS for sleep aid.       Patient Active Problem List    Diagnosis Date Noted   • Melena 07/21/2017   • Insulin resistance 05/02/2017   • Essential hypertension 05/02/2017   • Migraine without status migrainosus, not intractable 05/02/2017   • Dyslipidemia 05/02/2017   • GERD (gastroesophageal reflux disease) 05/02/2017   • Primary insomnia 05/02/2017   • Hypothyroidism 05/02/2017   • Retinal detachment 02/12/2013       Current Outpatient Prescriptions   Medication Sig Dispense Refill   • trazodone (DESYREL) 50 MG Tab TAKE 1 TAB BY MOUTH 1 TIME DAILY AS NEEDED FOR SLEEP (AT NIGHT). 90 Tab 0   • losartan-hydrochlorothiazide (HYZAAR) 50-12.5 MG per tablet TAKE 1 TAB BY MOUTH EVERY DAY. 90 Tab 0   • sumatriptan (IMITREX) 100 MG tablet Take 1 Tab by mouth Once PRN for Migraine for up to 1 dose. 10 Tab 3   • propranolol (INDERAL) 40 MG Tab Take 1 Tab by mouth every day. (Patient taking differently: Take 40 mg by mouth 2 times a day.) 90 Tab 6   • Blood Glucose Monitoring Suppl SUPPLIES Misc Lancets order: Lancets  strips for One Touch Ultra 2 meter. Sig: check blood sugars twice daily (Patient taking differently: Lancets order for once a day testing for diabetes E11.9) 200 Each 2   • omeprazole (PRILOSEC) 40 MG delayed-release capsule TAKE 1 CAPSULE BY MOUTH EVERY MORNING, 30 MINUTES BEFORE 1ST MEAL OF DAY 30 Cap 3   • Blood Glucose Monitoring Suppl Device Meter: Freestyle glucometer. Sig. Use as directed for blood sugar monitoring twice daily. (Patient taking differently: Any glucometer covered by Medicare. Test once daily for diabetes E11.9) 1 Device 0   • Blood Glucose Monitoring Suppl SUPPLIES Misc Test strips order: Test strips for Abbott Freestyle Lite meter. Sig: use bid and prn ssx high or low sugar (Patient  taking differently: Any test strip covered by Medicare, test once daily for diabetes E11.9) 200 Strip 11   • metformin (GLUCOPHAGE) 1000 MG tablet Take 1 Tab by mouth 2 times a day. 0.5 TAB DAILY 60 Tab 3   • levothyroxine (SYNTHROID) 50 MCG Tab TAKE 1 TAB BY MOUTH EVERY DAY. 90 Tab 3   • prednisoLONE acetate (PRED FORTE) 1 % Suspension INSTILL 1 DROP INTO BOTH EYES 4 TIMES A DAY AS DIRECTED  1   • montelukast (SINGULAIR) 10 MG Tab Take 10 mg by mouth every day.     • lovastatin (MEVACOR) 20 MG TABS Take 20 mg by mouth every evening.     • multivitamin (THERAGRAN) TABS Take 1 Tab by mouth every day.       • metformin (GLUCOPHAGE) 1000 MG tablet TAKE 1 TAB BY MOUTH 2 TIMES A DAY. 0.5 TAB DAILY 60 Tab 3   • omeprazole (PRILOSEC) 40 MG delayed-release capsule Take 1 Cap by mouth every day. 90 Cap 1   • metformin (GLUCOPHAGE) 1000 MG tablet TAKE 1 TAB BY MOUTH 2 TIMES A DAY. 0.5 TAB DAILY 180 Tab 0   • Blood Glucose Monitoring Suppl SUPPLIES Misc Test strips order: Test strips  for One Touch Ultra 2 meter. Sig: check blood sugars twice daily DX:E11.9 200 Each 2   • Blood Glucose Monitoring Suppl Device Meter: Dispense One touch Ultra 2 . Sig. Check blood sugars twice daily DX:E11.9 1 Device 2   • Blood Glucose Monitoring Suppl SUPPLIES Misc Lancets order: Lancets for Abbott Freestyle Lite meter. Sig: use bid and prn ssx high or low sugar 200 Each 11   • metformin (GLUCOPHAGE) 1000 MG tablet TAKE 1 TAB BY MOUTH 2 TIMES A DAY. 60 Tab 6   • levothyroxine (SYNTHROID) 50 MCG Tab Take 1 Tab by mouth every day. 30 Tab 0   • cimetidine (TAGAMET) 800 MG tablet Take 400 mg by mouth every day.       Current Facility-Administered Medications   Medication Dose Route Frequency Provider Last Rate Last Dose   • acetaminophen (TYLENOL) tablet 750 mg  750 mg Oral Q6HRS PRN Chad De Dios M.D.           SH: Denies smoking, alcohol, or drugs. Not , but with a partner. Primary language is Cuban and her partner acts as her  ".      Family History   Problem Relation Age of Onset   • Prostate cancer Father    • Heart Attack Mother    • Diabetes Sister        ROS: As per HPI. Additional pertinent symptoms as noted below.    Constitutional: Endorses feeling \"hot and cold.\" Endorses unintentional weight loss. Denies hair loss.   Eyes: Endorses increased blurred vision.   ENT: Endorses pain behind ear (general area of mastoid). Endorses gingival ulcers. Denies hearing loss.   Cardiovascular: Endorses chest pain, but denies that pain is associated with exertion.  Respiratory: Endorses cough/wheeze, but associated with lying flat, not exertion.  GI: Endorses abdominal pain and RLQ tenderness. Endorses melenic stools. Endorses GERD with coughing/wheezing at night.   Musculo-skeletal: Endorses joint pains.   Skin: Endorses firm nodules anterior arms (likely subdermal).   Neurological: Endorses blurred vision.  All else negative except as noted in HPI.     /80 mmHg  Pulse 72  Temp(Src) 36.2 °C (97.1 °F)  Ht 1.626 m (5' 4\")  Wt 69.128 kg (152 lb 6.4 oz)  BMI 26.15 kg/m2  SpO2 96%    Physical Exam  General:  Elderly woman, appears younger than stated age, NAD.   Eyes: No conjunctival injection. No scleral icterus.  Oral: Adentulous. In inferior right-sided mouth underlying dentures, there is small, uneven area of erosion with erythematous base.   Neck: Supple. No lymphadenopathy noted. However, on visual inspection, Left side of throat and supraclavicular area appears thacker than Right.   Lungs: CTAB.  Back: area of point tenderness in inferior cervical spinal vertebrae. Otherwise no TTP.  Cardiovascular: RRR w/o M/R/G.   Abdomen: +BS. There is small area of induration without fluctuance in RLQ that is moderately TTP.   Extremities: No clubbing, cyanosis, edema. Diabetic foot exam was performed and there was no scaling; nails non-brittle; no ulcers apparent. Extremities warm.  Skin: Clear. No rash or suspicious skin lesions " noted.  Other: non-tender firm subdermal nodules palpated in anterior arm approx 7-9 cm distal to armpit.   Breast exam: No axillary LAD noted. In superior medial quadrant of right breast and superior lateral quadrant of left breast are areas of moderate to severe tenderness with possible slight induration underlying. No lumps palpable. No expression of fluid from nipples.       Assessment and Plan    # Gastroesophageal reflux disease with esophagitis: Pt has longstanding GERD but now complains of new dysphagia, concerning for esophageal cancer 2/2 Reed's esophagus, and black stools concerning for upper GI bleed.   -Urgent GI consult for evaluation/EGD.  -Increased cimetidine from 400 mg Qdaily to 800 mg bid.  -Renewed omeprazole 40 mg delayed release Qdaily.  -Discontinued ibuprofen Rx.   -Will f/u with patient in 5 weeks regarding all test results and any required medication changes at that point.    # Screening for breast cancer: L-sided throat/supraclavicular fullness appearance, areas of moderate to severe tenderness in superior breasts, and firm nontender areas in upper arms raise some concern. Even without that, given patient's age and length of time since last mammography, she should be screened.   -Refer for mammography screen.     # Insulin resistance: Last A1C put her into insulin resistant, non-diabetic range. She is currently on metformin. Vision worsening is concerning for progression of dz.  -Increased metformin from 500 mg/day to 500 mg bid.  -BG home monitoring kit/equipment re-ordered.  -Ordered repeat HbA1C.  -Ordered (fasting) CMP to assess renal and hepatic function.  -Ordered CBC.  -Patient has an ophthalmologist and states she has an appointment upcoming; encouraged patient to f/u with ophthalmologist regarding vision changes.    # Oral HSV with gingival ulceration:   -Ordered tube of topical acyclovir (zovirax), to be used 5x daily for 4 days while outbreak ongoing.     # Migraine without  status migrainosus, not intractable, unspecified migraine type  -Increased dose propranolol from 40 mg Qdaily to 40 mg bid.   -Renewed sumatriptan Rx, with 20 doses per month.   -Encouraged Ms. Estes to f/u with her neurologist re: possible medication changes.    # Hypothyroidism, unspecified type:   -Renewed levothyroxine 50 mcg Qdaily.    # Dyslipidemia  -Renewed lovastatin 20 mg Qdaily.  -Ordered lipid panel.     # Primary insomnia  -Renewed trazodone at 50 mg QHS.     # Hypertension  -Renewed losartan-HCTZ 50/12.5 Qdaily.      Followup: Return in about 5 weeks (around 8/25/2017).      Signed by: Adriana Almaraz M.D.

## 2017-07-26 ENCOUNTER — TELEPHONE (OUTPATIENT)
Dept: INTERNAL MEDICINE | Facility: MEDICAL CENTER | Age: 67
End: 2017-07-26

## 2017-07-27 LAB
ALBUMIN SERPL-MCNC: 4.6 G/DL (ref 3.6–4.8)
ALBUMIN/GLOB SERPL: 1.4 {RATIO} (ref 1.2–2.2)
ALP SERPL-CCNC: 58 IU/L (ref 39–117)
ALT SERPL-CCNC: 25 IU/L (ref 0–32)
AST SERPL-CCNC: 23 IU/L (ref 0–40)
BASOPHILS # BLD AUTO: 0.1 X10E3/UL (ref 0–0.2)
BASOPHILS NFR BLD AUTO: 1 %
BILIRUB SERPL-MCNC: 0.5 MG/DL (ref 0–1.2)
BUN SERPL-MCNC: 13 MG/DL (ref 8–27)
BUN/CREAT SERPL: 15 (ref 12–28)
CALCIUM SERPL-MCNC: 10.2 MG/DL (ref 8.7–10.3)
CHLORIDE SERPL-SCNC: 100 MMOL/L (ref 96–106)
CHOLEST SERPL-MCNC: 197 MG/DL (ref 100–199)
CO2 SERPL-SCNC: 20 MMOL/L (ref 18–29)
COMMENT 011824: ABNORMAL
CREAT SERPL-MCNC: 0.89 MG/DL (ref 0.57–1)
EOSINOPHIL # BLD AUTO: 0.2 X10E3/UL (ref 0–0.4)
EOSINOPHIL NFR BLD AUTO: 3 %
ERYTHROCYTE [DISTWIDTH] IN BLOOD BY AUTOMATED COUNT: 13.3 % (ref 12.3–15.4)
GLOBULIN SER CALC-MCNC: 3.4 G/DL (ref 1.5–4.5)
GLUCOSE SERPL-MCNC: 147 MG/DL (ref 65–99)
HBA1C MFR BLD: 6.7 % (ref 4.8–5.6)
HCT VFR BLD AUTO: 42.1 % (ref 34–46.6)
HDLC SERPL-MCNC: 32 MG/DL
HGB BLD-MCNC: 14.2 G/DL (ref 11.1–15.9)
IMM GRANULOCYTES # BLD: 0 X10E3/UL (ref 0–0.1)
IMM GRANULOCYTES NFR BLD: 0 %
IMMATURE CELLS  115398: ABNORMAL
LDLC SERPL CALC-MCNC: 99 MG/DL (ref 0–99)
LYMPHOCYTES # BLD AUTO: 3.2 X10E3/UL (ref 0.7–3.1)
LYMPHOCYTES NFR BLD AUTO: 43 %
MCH RBC QN AUTO: 30.7 PG (ref 26.6–33)
MCHC RBC AUTO-ENTMCNC: 33.7 G/DL (ref 31.5–35.7)
MCV RBC AUTO: 91 FL (ref 79–97)
MONOCYTES # BLD AUTO: 0.7 X10E3/UL (ref 0.1–0.9)
MONOCYTES NFR BLD AUTO: 9 %
MORPHOLOGY BLD-IMP: ABNORMAL
NEUTROPHILS # BLD AUTO: 3.2 X10E3/UL (ref 1.4–7)
NEUTROPHILS NFR BLD AUTO: 44 %
NRBC BLD AUTO-RTO: ABNORMAL %
PLATELET # BLD AUTO: 494 X10E3/UL (ref 150–379)
POTASSIUM SERPL-SCNC: 4.1 MMOL/L (ref 3.5–5.2)
PROT SERPL-MCNC: 8 G/DL (ref 6–8.5)
RBC # BLD AUTO: 4.63 X10E6/UL (ref 3.77–5.28)
SODIUM SERPL-SCNC: 140 MMOL/L (ref 134–144)
TRIGL SERPL-MCNC: 328 MG/DL (ref 0–149)
VLDLC SERPL CALC-MCNC: 66 MG/DL (ref 5–40)
WBC # BLD AUTO: 7.4 X10E3/UL (ref 3.4–10.8)

## 2017-07-27 RX ORDER — ACYCLOVIR 400 MG/1
400 TABLET ORAL
Qty: 25 TAB | Refills: 0 | Status: SHIPPED | OUTPATIENT
Start: 2017-07-27 | End: 2017-08-01

## 2017-07-27 RX ORDER — BENZOCAINE/MENTHOL 6 MG-10 MG
LOZENGE MUCOUS MEMBRANE
Qty: 1 TUBE | Refills: 0 | Status: SHIPPED | OUTPATIENT
Start: 2017-07-27 | End: 2019-01-28

## 2017-07-27 NOTE — TELEPHONE ENCOUNTER
On 7/26, I was alerted that pharmacy wanted clarification on application of acyclovir topical for Ms. Estes. Based on a systematic review demonstrating superior results using both antiviral and topical corticosteroid over placebo or antiviral alone (Farnk TRUJILLO et al, 2015), I decided to change prescription to oral acyclovir 400 mg tablets 5x/day for 5 days (dosing based on Neville et al, 1990) plus topical hydrocortisone 1% cream to help reduce her gingival ulcer. I then cancelled the topical acyclovir so that she could use the alternative regimen instead.

## 2017-08-03 ENCOUNTER — HOSPITAL ENCOUNTER (OUTPATIENT)
Dept: RADIOLOGY | Facility: MEDICAL CENTER | Age: 67
End: 2017-08-03

## 2017-08-03 ENCOUNTER — TELEPHONE (OUTPATIENT)
Dept: INTERNAL MEDICINE | Facility: MEDICAL CENTER | Age: 67
End: 2017-08-03

## 2017-08-03 NOTE — TELEPHONE ENCOUNTER
----- Message from Rory Hairston sent at 8/2/2017 11:27 AM PDT -----  Regarding: CHANGE IN PHARMACIES/SEES DR. JACOBS  Contact: [phone]  Pt states her pharmacy has [facility] from [facility]. Pt states that [employer] needs he Rx's faxed to them. Pt also states prior PCP, Dr. Thomas VINSON, prescribed her omeprazole 20 mg 1 cap by mouth twice a day and needs it to be re-written and faxed to [facility] because [facility] fill it. States that it has to go through [facility].

## 2017-08-07 ENCOUNTER — HOSPITAL ENCOUNTER (OUTPATIENT)
Dept: RADIOLOGY | Facility: MEDICAL CENTER | Age: 67
End: 2017-08-07
Attending: STUDENT IN AN ORGANIZED HEALTH CARE EDUCATION/TRAINING PROGRAM
Payer: MEDICARE

## 2017-08-07 DIAGNOSIS — Z12.31 ENCOUNTER FOR SCREENING MAMMOGRAM FOR MALIGNANT NEOPLASM OF BREAST: ICD-10-CM

## 2017-08-07 DIAGNOSIS — Z12.39 ENCOUNTER FOR OTHER SCREENING FOR MALIGNANT NEOPLASM OF BREAST: ICD-10-CM

## 2017-08-08 NOTE — TELEPHONE ENCOUNTER
At time of visit all meds were sent to [facility] pharmacy

## 2017-08-09 NOTE — TELEPHONE ENCOUNTER
Please make sure [facility] received/prescribed the Rx's I prescribed at last appt including her omeprazole. (Note that she had previously been prescribed omeprazole 20 mg bid and I prescribed 40 mg sustained release Qdaily). I want to make sure the patient received it, as I got some conflicting messages about [facility] not receiving faxes of the Rx's.     Thanks,    Dr. Almaraz

## 2017-08-10 NOTE — TELEPHONE ENCOUNTER
Called [facility] pharmacy and spoke with Jesus.  He stated that patient picked up at [facility] pharmacy Omeprazole 40mg qt of 90 on 6/23/7, and she also picked up Omeprazole 20mg qt 60 at the [facility] pharmacy on 7/31/2017.  Its too soon to fill a new prescription for Omeprazole.  She can fill again after 8/23/17.

## 2017-08-24 ENCOUNTER — TELEPHONE (OUTPATIENT)
Dept: RADIOLOGY | Facility: MEDICAL CENTER | Age: 67
End: 2017-08-24

## 2017-08-24 ENCOUNTER — TELEPHONE (OUTPATIENT)
Dept: INTERNAL MEDICINE | Facility: MEDICAL CENTER | Age: 67
End: 2017-08-24

## 2017-08-24 ENCOUNTER — OFFICE VISIT (OUTPATIENT)
Dept: INTERNAL MEDICINE | Facility: MEDICAL CENTER | Age: 67
End: 2017-08-24
Payer: MEDICARE

## 2017-08-24 VITALS
BODY MASS INDEX: 25.78 KG/M2 | SYSTOLIC BLOOD PRESSURE: 130 MMHG | DIASTOLIC BLOOD PRESSURE: 71 MMHG | WEIGHT: 151 LBS | HEART RATE: 80 BPM | TEMPERATURE: 97.7 F | HEIGHT: 64 IN | OXYGEN SATURATION: 96 % | RESPIRATION RATE: 16 BRPM

## 2017-08-24 DIAGNOSIS — K21.00 GASTROESOPHAGEAL REFLUX DISEASE WITH ESOPHAGITIS: ICD-10-CM

## 2017-08-24 DIAGNOSIS — E03.9 HYPOTHYROIDISM, UNSPECIFIED TYPE: ICD-10-CM

## 2017-08-24 DIAGNOSIS — G43.909 MIGRAINE WITHOUT STATUS MIGRAINOSUS, NOT INTRACTABLE, UNSPECIFIED MIGRAINE TYPE: ICD-10-CM

## 2017-08-24 DIAGNOSIS — R05.3 CHRONIC COUGH: ICD-10-CM

## 2017-08-24 DIAGNOSIS — D75.839 THROMBOCYTOSIS: ICD-10-CM

## 2017-08-24 DIAGNOSIS — G62.9 PERIPHERAL POLYNEUROPATHY: ICD-10-CM

## 2017-08-24 DIAGNOSIS — I10 ESSENTIAL HYPERTENSION: ICD-10-CM

## 2017-08-24 DIAGNOSIS — Z12.39 BREAST CANCER SCREENING: ICD-10-CM

## 2017-08-24 DIAGNOSIS — E78.5 DYSLIPIDEMIA: ICD-10-CM

## 2017-08-24 DIAGNOSIS — E88.819 INSULIN RESISTANCE: ICD-10-CM

## 2017-08-24 PROCEDURE — 99214 OFFICE O/P EST MOD 30 MIN: CPT | Mod: GC | Performed by: INTERNAL MEDICINE

## 2017-08-24 RX ORDER — GABAPENTIN 100 MG/1
100 CAPSULE ORAL
Qty: 60 CAP | Refills: 1 | Status: SHIPPED | OUTPATIENT
Start: 2017-08-24 | End: 2017-10-06 | Stop reason: SDUPTHER

## 2017-08-24 NOTE — PATIENT INSTRUCTIONS
It was a pleasure seeing you again, Ms. Estes.     Today, we decided to order the following labs:    -B12 level for now  -Creatine kinase for now  -TSH, A1C, CBC, and repeat CK in October.   -pulmonary function tests with bronchodilator to look for asthma    We will also be discontinuing the statin today due to concern it is causing your muscle pains. We can start the gabapentin today as well for the numbness/tingling/burning in your hands and feet.     We will plan to see you back in October after we have the results of all your tests and after you have followed up with GI and mammography.

## 2017-08-24 NOTE — TELEPHONE ENCOUNTER
----- Message from Marilee Pike sent at 8/24/2017  9:57 AM PDT -----  Regarding: Sung pt  [facility] needs us to submit a revised mammo order in Epic. She says it needs to be ordered as a Diagnostic Bi- Lateral mammogram with ultra sound as needed.   Pt dx is breast pain and lump.

## 2017-08-24 NOTE — MR AVS SNAPSHOT
"        Tonya Daviesiliana   2017 3:15 PM   Office Visit   MRN: [id number]    Department:  [facility] - Internal Med   Dept Phone:  [phone]    Description:  Female : 1950   Provider:  Adriana Almaraz M.D.           Reason for Visit     Follow-Up cough at night-productive white phlegm-wheeze    Medication Refill levothyroxine      Allergies as of 2017     No Known Allergies      Vital Signs     Blood Pressure Pulse Temperature Respirations Height Weight    130/71 mmHg 80 36.5 °C (97.7 °F) 16 1.626 m (5' 4.02\") 68.493 kg (151 lb)    Body Mass Index Oxygen Saturation Smoking Status             25.91 kg/m2 96% Never Smoker          Basic Information     Date Of Birth Sex Race Ethnicity Preferred Language    1950 Female  or  Non- German      Your appointments     Aug 25, 2017 12:30 PM   MA DX30 with [facility] MG 1   [facility] ([address] [facility] [address] [facility] [address])    [address]  [address]  [facility] [id number] [phone]            Oct 30, 2017  3:15 PM   Established Patient with Adriana Almaraz M.D.   [facility] - Internal Medicine (--)    [address] [place] [address]  [address]  [facility] [address] [phone]           You will be receiving a confirmation call a few days before your appointment from our automated call confirmation system.              Problem List              ICD-10-CM Priority Class Noted - Resolved    Retinal detachment H33.20   2013 - Present    Insulin resistance E88.81   2017 - Present    Essential hypertension I10   2017 - Present    Migraine without status migrainosus, not intractable G43.909   2017 - Present    Dyslipidemia E78.5   2017 - Present    GERD (gastroesophageal reflux disease) K21.9   2017 - Present    Primary insomnia F51.01   2017 - Present    Hypothyroidism E03.9   2017 - Present    Melena K92.1   2017 - Present      Health Maintenance        Date Due Completion Dates    DIABETES " MONOFILAMENT / LE EXAM 1950 ---    RETINAL SCREENING 5/1/1966 ---    IMM DTaP/Tdap/Td Vaccine (1 - Tdap) 5/1/1967 ---    PAP SMEAR 5/1/1969 ---    MAMMOGRAM 5/1/1988 ---    IMM ZOSTER VACCINE 5/1/2008 ---    BONE DENSITY 5/1/2013 ---    IMM INFLUENZA (1) 9/1/2017 10/10/2016, 11/18/2015, 10/7/2014, 11/7/2013, 10/7/2010    URINE ACR / MICROALBUMIN 9/15/2017 9/15/2016    IMM PNEUMOCOCCAL 65+ (ADULT) LOW/MEDIUM RISK SERIES (2 of 2 - PPSV23) 11/10/2017 11/10/2016    A1C SCREENING 1/26/2018 7/26/2017, 9/15/2016, 11/11/2013, 8/10/2013    FASTING LIPID PROFILE 7/26/2018 7/26/2017, 8/10/2013    SERUM CREATININE 7/26/2018 7/26/2017, 9/15/2016, 3/18/2015, 8/10/2013, 2/12/2013    COLONOSCOPY 10/20/2019 10/20/2009            Current Immunizations     13-VALENT PCV PREVNAR 11/10/2016  2:08 PM    INFLUENZA VACCINE H1N1 7/27/2010    Influenza Vaccine Adult HD 10/10/2016    Influenza Vaccine Quad Inj (Pf) 11/18/2015, 10/7/2014    Influenza Vaccine Quad Inj (Preserved) 11/7/2013, 10/7/2010      Below and/or attached are the medications your provider expects you to take. Review all of your home medications and newly ordered medications with your provider and/or pharmacist. Follow medication instructions as directed by your provider and/or pharmacist. Please keep your medication list with you and share with your provider. Update the information when medications are discontinued, doses are changed, or new medications (including over-the-counter products) are added; and carry medication information at all times in the event of emergency situations     Allergies:  No Known Allergies          Medications  Valid as of: August 24, 2017 -  4:07 PM    Generic Name Brand Name Tablet Size Instructions for use    Bismuth Subsalicylate   Take  by mouth.        Blood Glucose Monitoring Suppl (Misc) Blood Glucose Monitoring Suppl Supplies Lancets order: Lancets for Abbott Freestyle Lite meter. Sig: use bid and prn ssx high or low sugar         Blood Glucose Monitoring Suppl (Misc) Blood Glucose Monitoring Suppl Supplies Lancets order: Lancets  strips for One Touch Ultra 2 meter. Sig: check blood sugars twice daily        Blood Glucose Monitoring Suppl (Device) Blood Glucose Monitoring Suppl  Meter: Freestyle glucometer. Sig. Use as directed for blood sugar monitoring twice daily for insulin resistance.        Blood Glucose Monitoring Suppl (Misc) Blood Glucose Monitoring Suppl Supplies Test strips order: Test strips for Abbott Freestyle Lite meter. Sig: use bid and prn ssx high or low sugar        Cimetidine (Tab) TAGAMET 800 MG Take 1 Tab by mouth 2 times a day.        Hydrocortisone (Cream) hydrocortisone 1 % Please apply 5 times per day to the ulcer in your mouth for 3-5 days or until reduction of symptoms.        Levothyroxine Sodium (Tab) SYNTHROID 50 MCG TAKE 1 TABLET EVERY DAY        Losartan Potassium-HCTZ (Tab) HYZAAR 50-12.5 MG Take 1 Tab by mouth every day. TAKE 1 TAB BY MOUTH EVERY DAY.        Losartan Potassium-HCTZ (Tab) HYZAAR 50-12.5 MG TAKE 1 TAB BY MOUTH EVERY DAY.        Lovastatin (Tab) MEVACOR 20 MG Take 1 Tab by mouth every day.        MetFORMIN HCl (Tab) GLUCOPHAGE 1000 MG Take 0.5 Tabs by mouth 2 times a day. TAKE 1/2 OF A TAB TWICE PER DAY        Montelukast Sodium (Tab) SINGULAIR 10 MG Take 10 mg by mouth every day.        Multiple Vitamin (Tab) THERAGRAN  Take 1 Tab by mouth every day.          Omeprazole (CAPSULE DELAYED RELEASE) PRILOSEC 40 MG Take 1 Cap by mouth every day.        PrednisoLONE Acetate (Suspension) PRED FORTE 1 % INSTILL 1 DROP INTO BOTH EYES 4 TIMES A DAY AS DIRECTED        Propranolol HCl (Tab) INDERAL 40 MG Take 1 Tab by mouth 2 times a day.        SUMAtriptan Succinate (Tab) IMITREX 100 MG Take 1 Tab by mouth Once PRN for Migraine for up to 1 dose.        TraZODone HCl (Tab) DESYREL 50 MG TAKE 1 TAB BY MOUTH 1 TIME DAILY AS NEEDED FOR SLEEP (AT NIGHT).        .                 Medicines prescribed today  were sent to:     [facility] - [place], [place] - [address]    [address] [address]    Phone: [phone] Fax: [phone]    Open 24 Hours?: No    [id number] [place], [place] - [address]    [address] [place] [address]    Phone: [phone] Fax: [phone]    Open 24 Hours?: No      Medication refill instructions:       If your prescription bottle indicates you have medication refills left, it is not necessary to call your provider’s office. Please contact your pharmacy and they will refill your medication.    If your prescription bottle indicates you do not have any refills left, you may request refills at any time through one of the following ways: The online [employer] system (except Urgent Care), by calling your provider’s office, or by asking your pharmacy to contact your provider’s office with a refill request. Medication refills are processed only during regular business hours and may not be available until the next business day. Your provider may request additional information or to have a follow-up visit with you prior to refilling your medication.   *Please Note: Medication refills are assigned a new Rx number when refilled electronically. Your pharmacy may indicate that no refills were authorized even though a new prescription for the same medication is available at the pharmacy. Please request the medicine by name with the pharmacy before contacting your provider for a refill.        Instructions    It was a pleasure seeing you again, Ms. Estes.     Today, we decided to order the following labs:    -B12 level for now  -Creatine kinase for now  -TSH, A1C, CBC, and repeat CK in October.   -pulmonary function tests with bronchodilator to look for asthma    We will also be discontinuing the statin today due to concern it is causing your muscle pains. We can start the gabapentin today as well for the numbness/tingling/burning in your hands and feet.        We will plan to see you back in October after we have the results of all your tests and after you have followed up with GI and mammography.           MyChart Status: Patient Declined

## 2017-08-25 ENCOUNTER — HOSPITAL ENCOUNTER (OUTPATIENT)
Dept: RADIOLOGY | Facility: MEDICAL CENTER | Age: 67
End: 2017-08-25
Attending: STUDENT IN AN ORGANIZED HEALTH CARE EDUCATION/TRAINING PROGRAM
Payer: MEDICARE

## 2017-08-25 DIAGNOSIS — N63.10 LUMP OF RIGHT BREAST: ICD-10-CM

## 2017-08-25 DIAGNOSIS — N63.0 BREAST LUMP: ICD-10-CM

## 2017-08-25 DIAGNOSIS — N64.4 BREAST PAIN, RIGHT: ICD-10-CM

## 2017-08-25 PROCEDURE — G0204 DX MAMMO INCL CAD BI: HCPCS

## 2017-08-25 PROCEDURE — 76642 ULTRASOUND BREAST LIMITED: CPT | Mod: LT

## 2017-08-25 NOTE — PROGRESS NOTES
Established Patient    Tonya presents today with the following:    CC: f/u for multiple medical problems.     HPI: [Note: as previously, boyfriend acts as ]    1. Insulin resistance  -HbA1c testing 6.7 as of 7/26, up from previous values.   -Her metformin was increased to 500 bid at our last visit on 7/21/17.   -Endorses new tingling/numbness/burning pain of hands and feet, worse at night.     2. Essential hypertension  -Denies any blurred vision, paresis, dysphagia, speech problems, HA.  -Adherent to losartan-HCTZ as renewed at last visit    3. Migraine without status migrainosus, not intractable, unspecified migraine type  -Propranolol dosage doubled to 40 mg bid from Qdaily at our last visit as prophylactic measure.  -She now states that she no longer gets regular migraines.    4. Dyslipidemia  -has been taking daily statin, but now states that since our last appointment she has developed 8/10 muscle pain in all four extremities with multiple specific points of severe tenderness. Denies cola-colored urine. Endorses new pain with walking also.     5. Gastroesophageal reflux disease with esophagitis  -went to referral appt w/ GI who performed H. Pylori breath test and recommended omeprazole 20 mg bid. Although I could not find results in chart yet, per patient the results of the test were positive and she was placed on flagyl + doxy with subsequent resolution of burning chest pain and dysphagia.     6. Hypothyroidism, unspecified type  -Denies new sx of heat/cold intolerance, hair loss.  -Last TSH in September 2016, was normal.   -Remains on synthroid 50 mcg qdaily    7. Peripheral polyneuropathy (CMS-HCC)  -New sx of burning, tingling, numbness in hands and feet b/l, worse at night.     8. Breast cancer screening  -was referred for mamography at last visit  -plan for mammography and LN bx tomorrow per pt (8/25/17)    9. Chronic cough  -States that she has longstanding cough. Worse with lying  down, hot and cold weather. Remains even post-resolution of GERD sx.   -States that she was previously on montelukast and inhaler (unspecified type) with improvement.  -Does not remember ever having had PFTs.       Patient Active Problem List    Diagnosis Date Noted   • Melena 07/21/2017   • Insulin resistance 05/02/2017   • Essential hypertension 05/02/2017   • Migraine without status migrainosus, not intractable 05/02/2017   • Dyslipidemia 05/02/2017   • GERD (gastroesophageal reflux disease) 05/02/2017   • Primary insomnia 05/02/2017   • Hypothyroidism 05/02/2017   • Retinal detachment 02/12/2013       Current Outpatient Prescriptions   Medication Sig Dispense Refill   • levothyroxine (SYNTHROID) 50 MCG Tab TAKE 1 TABLET EVERY DAY 90 Tab 0   • Bismuth Subsalicylate (PEPTO-BISMOL PO) Take  by mouth.     • gabapentin (NEURONTIN) 100 MG Cap Take 1 Cap by mouth every bedtime. 60 Cap 1   • losartan-hydrochlorothiazide (HYZAAR) 50-12.5 MG per tablet TAKE 1 TAB BY MOUTH EVERY DAY. 30 Tab 3   • hydrocortisone 1 % Cream Please apply 5 times per day to the ulcer in your mouth for 3-5 days or until reduction of symptoms. 1 Tube 0   • metformin (GLUCOPHAGE) 1000 MG tablet Take 0.5 Tabs by mouth 2 times a day. TAKE 1/2 OF A TAB TWICE PER DAY 30 Tab 3   • omeprazole (PRILOSEC) 40 MG delayed-release capsule Take 1 Cap by mouth every day. 30 Cap 3   • propranolol (INDERAL) 40 MG Tab Take 1 Tab by mouth 2 times a day. 60 Tab 3   • sumatriptan (IMITREX) 100 MG tablet Take 1 Tab by mouth Once PRN for Migraine for up to 1 dose. 20 Tab 3   • trazodone (DESYREL) 50 MG Tab TAKE 1 TAB BY MOUTH 1 TIME DAILY AS NEEDED FOR SLEEP (AT NIGHT). 30 Tab 3   • Blood Glucose Monitoring Suppl Device Meter: Freestyle glucometer. Sig. Use as directed for blood sugar monitoring twice daily for insulin resistance. 1 Device 0   • Blood Glucose Monitoring Suppl SUPPLIES Misc Test strips order: Test strips for Abbott Freestyle Lite meter. Sig: use bid and  "prn ssx high or low sugar 200 Strip 11   • losartan-hydrochlorothiazide (HYZAAR) 50-12.5 MG per tablet Take 1 Tab by mouth every day. TAKE 1 TAB BY MOUTH EVERY DAY. 90 Tab 0   • Blood Glucose Monitoring Suppl SUPPLIES Misc Lancets order: Lancets  strips for One Touch Ultra 2 meter. Sig: check blood sugars twice daily (Patient taking differently: Lancets order for once a day testing for diabetes E11.9) 200 Each 2   • Blood Glucose Monitoring Suppl SUPPLIES Misc Lancets order: Lancets for Abbott Freestyle Lite meter. Sig: use bid and prn ssx high or low sugar 200 Each 11   • montelukast (SINGULAIR) 10 MG Tab Take 10 mg by mouth every day.     • multivitamin (THERAGRAN) TABS Take 1 Tab by mouth every day.       • prednisoLONE acetate (PRED FORTE) 1 % Suspension INSTILL 1 DROP INTO BOTH EYES 4 TIMES A DAY AS DIRECTED  1     Current Facility-Administered Medications   Medication Dose Route Frequency Provider Last Rate Last Dose   • acetaminophen (TYLENOL) tablet 750 mg  750 mg Oral Q6HRS PRN Chad De Dios M.D.           ROS: As per HPI. All other symptoms reviewed and negative.    /71 mmHg  Pulse 80  Temp(Src) 36.5 °C (97.7 °F)  Resp 16  Ht 1.626 m (5' 4.02\")  Wt 68.493 kg (151 lb)  BMI 25.91 kg/m2  SpO2 96%    Physical Exam   Constitutional:  Pleasant Nepalese-speaking woman, appears younger than stated age, NAD.  Eyes: No scleral icterus or conjunctival injection.  Neck: Neck supple. No thyromegaly.   Cardiovascular: Normal rate, regular rhythm and normal heart sounds.  Exam reveals no gallop and no friction rub.  No murmur heard.  Pulmonary/Chest: CTAB w/o adventitious sounds.   GI: +BS, non-TTP.   Musculoskeletal:   Trace peripheral edema. There is moderate to severe tenderness to palpation diffusely in soft tissues of extremities x 4.   Lymphadenopathy: no cervical adenopathy however there is fullness in appearance and on palpation of left supraclavicular area vs right, without discrete LNs noted on " palpation.   Neurological: Antalgic gait. alert and oriented to person, place, and time. Thought content linear and logical.     Note: I have reviewed all pertinent labs and diagnostic tests associated with this visit with specific comments listed under the assessment and plan below    RELEVANT LABS:   7/26/17 CBC: WBC 7.4, Hgb 14.2, Hct 42.1, Plt 494  7/26/17 CMP: glucose 147, BUN 13, Cr .89, Na 140, K 4.1, Cl 100, CO2 20, albumin 4.6, Total bili .5, AP 58, AST 23, ALT 25.  7/26/17 lipid panel: , HDL 32, LDL 99    Assessment and Plan    1. Insulin resistance: A1C now increased to 6.7 at last testing. It will not demonstrate change for 3 months. We will give her increased metformin dose time to affect her A1C before considering changes. Additionally, given her age we can be more tolerant of elevated A1C than in younger patients, and a goal of <7 is appropriate in any case.   -continue increased dose metformin.   -plan to retest A1C in October before next office visit.    2. Essential hypertension  -continue losartan-HCTZ  -seek medical care for sx of CVA or positive orthostatics.     3. Migraine without status migrainosus, not intractable, unspecified migraine type  -now resolved. Remain at current dose propranolol prophylaxis.     4. Dyslipidemia  -Statin discontinued given severe musculoskeletal pain and tenderness.  -CK ordered both for now and for October to assess for statin myopathy and/or resolution with cessation if present    5. Gastroesophageal reflux disease with esophagitis  -Continue omeprazole 20 mg bid  -D/C cimetidine as no need for adjunct especially with resolution of sx s/p treatment of H. Pylori at this time    6. Hypothyroidism, unspecified type  -continue synthroid 50 mcg/day    7. Peripheral polyneuropathy (CMS-HCC): may be due to DM (although would expect this more with longstanding, more severe DM) or 2/2 B12 deficiency related to chronic PPI use/metformin  -B12 level ordered  -A1C  ordered for October as above  -100 mg QHS gabapentin ordered for sx, with instruction to increase dose to 200 mg QHS in two weeks PRN    8. Breast cancer screening  -Will f/u on mammography/LN bx scheduled for tomorrow    9. Chronic cough: may be related to GERD given worse when lying down, or may be related to asthma given temperature triggers + h/o improvement with asthma inhaler + montelukast.  -Spirometry w/ and w/o bronchodilator ordered  -Will reconsider asthma medications pending spirometry results.    10. Thrombocytosis (CMS-HCC): isolated TCS noted on 7/26 CBC. May be 2/2 inflammation related to myopathy (if present, will see with CK testing) but will re-assess with future labs following discontinuation statin and CK testing.   -Rpt CBC ordered for October. If TCS resolved then, NTD. If TCS remains/progresses, will consider further w/u for other causes, incl BM bx, genetic testing (eg LB 2 mut).       Followup: in 3 months    Signed by: Adriana Almaraz M.D.

## 2017-08-28 ENCOUNTER — TELEPHONE (OUTPATIENT)
Dept: INTERNAL MEDICINE | Facility: MEDICAL CENTER | Age: 67
End: 2017-08-28

## 2017-08-28 NOTE — TELEPHONE ENCOUNTER
"1. Caller Name: FAX: [facility]                      Call Back Number: [phone]    2. Message: Received a fax from [facility] pharmacy. Stating this:    \" Prescriber, a review of [facility]/Pharmacy prescription history indicates your patient may benefit from statin therapy.  This drug class is recommended for certain patients by the American Diabetes Association Standards of Medical Care.  Your patient has previously received statin therapy.  After speaking with your patient about the treatment of their diabetes, we are contacting you on behalf of your patient to restart therapy.  Thank you in advance for taking the time to review this information.  Sincerely,  Your local [facility] Pharmacist\"    Prior Therapy: Lovastatin 20mg one tab po qd     3. Patient approves office to leave a detailed voicemail/MyChart message: N\A    "

## 2017-08-31 NOTE — TELEPHONE ENCOUNTER
This patient was exhibiting symptoms of statin-induced myopathy so I discontinued the medication and ordered a serum CK level. If elevated, may wait several weeks and restart at f/u appointment once the CK has gone down.     Dr. Almaraz

## 2017-08-31 NOTE — TELEPHONE ENCOUNTER
Marianna, I believe I signed a physical order for this last week, the one they sent over. Can you please make sure that it was correct and I don't need to resubmit a third time?     Thank you!    Dr. Almaraz

## 2017-09-08 NOTE — TELEPHONE ENCOUNTER
When I saw the patient last, I believe she and her boyfriend told me they were told she would be undergoing a fine needle aspiration of a lump or lymph node? I saw lymphadenopathy was noted on her mammogram but didn't see any indications he'd had any kind of FNA or biopsy. Can you please just check to see if she had any kind of tissue sample drawn during her mammogram?

## 2017-09-20 ENCOUNTER — HOSPITAL ENCOUNTER (OUTPATIENT)
Dept: OTHER | Facility: MEDICAL CENTER | Age: 67
End: 2017-09-20
Attending: STUDENT IN AN ORGANIZED HEALTH CARE EDUCATION/TRAINING PROGRAM
Payer: MEDICARE

## 2017-09-20 PROCEDURE — 94060 EVALUATION OF WHEEZING: CPT

## 2017-09-20 PROCEDURE — 94726 PLETHYSMOGRAPHY LUNG VOLUMES: CPT | Mod: 26 | Performed by: INTERNAL MEDICINE

## 2017-09-20 PROCEDURE — 94729 DIFFUSING CAPACITY: CPT

## 2017-09-20 PROCEDURE — 94060 EVALUATION OF WHEEZING: CPT | Mod: 26 | Performed by: INTERNAL MEDICINE

## 2017-09-20 PROCEDURE — 94729 DIFFUSING CAPACITY: CPT | Mod: 26 | Performed by: INTERNAL MEDICINE

## 2017-09-20 PROCEDURE — 94726 PLETHYSMOGRAPHY LUNG VOLUMES: CPT

## 2017-09-20 ASSESSMENT — PULMONARY FUNCTION TESTS
FEV1/FVC_PERCENT_PREDICTED: 78
FEV1_PERCENT_PREDICTED: 99
FVC_PERCENT_PREDICTED: 91
FEV1/FVC_PERCENT_CHANGE: -300
FVC_PERCENT_PREDICTED: 93
FVC: 2.67
FEV1/FVC: 83
FEV1: 2.29
FEV1_PERCENT_CHANGE: 3
FEV1/FVC: 87.4
FEV1: 2.21
FEV1_PERCENT_PREDICTED: 102
FEV1_PREDICTED: 2.23
FVC: 2.62
FEV1/FVC_PERCENT_PREDICTED: 112
FVC_PREDICTED: 2.87
FEV1_PERCENT_CHANGE: -1
FEV1/FVC_PERCENT_PREDICTED: 106

## 2017-09-21 NOTE — PROCEDURES
DATE OF STUDY:  09/20/2017    FINDINGS:  The spirometry is normal.  No change after bronchodilators.  No   significant restriction hyperinflation.  Oxygen transfer was normal.  Flow   volume loop appears normal, although the expiratory limb was somewhat   irregular, did not meet ATS standards.    INTERPRETATION:  She did take this into account, but spirometry and volumes do   appear normal as well as oxygen transfer.       ____________________________________     MD JAVIER NGUYEN / DENAE    DD:  09/21/2017 09:34:49  DT:  09/21/2017 10:02:17    D#:  [id number]  Job#:  [id number]

## 2017-10-06 DIAGNOSIS — G62.9 POLYNEUROPATHY: ICD-10-CM

## 2017-10-09 RX ORDER — GABAPENTIN 100 MG/1
CAPSULE ORAL
Qty: 120 CAP | Refills: 3 | Status: SHIPPED | OUTPATIENT
Start: 2017-10-09 | End: 2017-10-30 | Stop reason: SDUPTHER

## 2017-10-09 NOTE — TELEPHONE ENCOUNTER
Reapproving the patient's gabapentin for her polyneuropathy with directions to take 2 tabs (200 mg) QHS PRN.

## 2017-10-30 ENCOUNTER — OFFICE VISIT (OUTPATIENT)
Dept: INTERNAL MEDICINE | Facility: MEDICAL CENTER | Age: 67
End: 2017-10-30
Payer: MEDICARE

## 2017-10-30 VITALS
BODY MASS INDEX: 25.74 KG/M2 | TEMPERATURE: 97.6 F | SYSTOLIC BLOOD PRESSURE: 110 MMHG | WEIGHT: 150.8 LBS | HEIGHT: 64 IN | HEART RATE: 74 BPM | OXYGEN SATURATION: 94 % | DIASTOLIC BLOOD PRESSURE: 62 MMHG

## 2017-10-30 DIAGNOSIS — G62.9 POLYNEUROPATHY: ICD-10-CM

## 2017-10-30 DIAGNOSIS — R68.2 DRY MOUTH: ICD-10-CM

## 2017-10-30 DIAGNOSIS — H04.123 DRY EYES: ICD-10-CM

## 2017-10-30 DIAGNOSIS — R22.9 MULTIPLE SKIN NODULES: ICD-10-CM

## 2017-10-30 DIAGNOSIS — R53.83 FATIGUE, UNSPECIFIED TYPE: ICD-10-CM

## 2017-10-30 DIAGNOSIS — E11.42 TYPE 2 DIABETES MELLITUS WITH DIABETIC POLYNEUROPATHY, WITHOUT LONG-TERM CURRENT USE OF INSULIN (HCC): ICD-10-CM

## 2017-10-30 DIAGNOSIS — R06.02 SHORTNESS OF BREATH: ICD-10-CM

## 2017-10-30 DIAGNOSIS — G43.909 MIGRAINE WITHOUT STATUS MIGRAINOSUS, NOT INTRACTABLE, UNSPECIFIED MIGRAINE TYPE: ICD-10-CM

## 2017-10-30 DIAGNOSIS — M79.10 MYALGIA: ICD-10-CM

## 2017-10-30 PROBLEM — E11.9 TYPE 2 DIABETES MELLITUS (HCC): Status: ACTIVE | Noted: 2017-05-02

## 2017-10-30 LAB
HBA1C MFR BLD: 6.6 % (ref ?–5.8)
INT CON NEG: NEGATIVE
INT CON POS: POSITIVE

## 2017-10-30 PROCEDURE — 83036 HEMOGLOBIN GLYCOSYLATED A1C: CPT | Mod: GC | Performed by: INTERNAL MEDICINE

## 2017-10-30 PROCEDURE — 99215 OFFICE O/P EST HI 40 MIN: CPT | Mod: GC | Performed by: INTERNAL MEDICINE

## 2017-10-30 PROCEDURE — 83036 HEMOGLOBIN GLYCOSYLATED A1C: CPT | Performed by: INTERNAL MEDICINE

## 2017-10-30 RX ORDER — PROPRANOLOL HYDROCHLORIDE 40 MG/1
TABLET ORAL
Qty: 180 TAB | Refills: 2 | Status: SHIPPED | OUTPATIENT
Start: 2017-10-30 | End: 2019-04-08

## 2017-10-30 RX ORDER — SUMATRIPTAN 100 MG/1
100 TABLET, FILM COATED ORAL
Qty: 20 TAB | Refills: 3 | Status: SHIPPED | OUTPATIENT
Start: 2017-10-30 | End: 2018-05-30 | Stop reason: SDUPTHER

## 2017-10-30 RX ORDER — GABAPENTIN 100 MG/1
200 CAPSULE ORAL 3 TIMES DAILY
Qty: 120 CAP | Refills: 3 | Status: SHIPPED | OUTPATIENT
Start: 2017-10-30 | End: 2019-04-08 | Stop reason: SDUPTHER

## 2017-10-30 RX ORDER — ACETAMINOPHEN 500 MG
500-1000 TABLET ORAL EVERY 6 HOURS PRN
COMMUNITY
End: 2019-04-08

## 2017-10-30 ASSESSMENT — ENCOUNTER SYMPTOMS
SPUTUM PRODUCTION: 1
ABDOMINAL PAIN: 0
FLANK PAIN: 0
VOMITING: 0
NAUSEA: 0
BLURRED VISION: 1
BRUISES/BLEEDS EASILY: 0
MYALGIAS: 1
DIARRHEA: 0
WEIGHT LOSS: 0
PALPITATIONS: 0
CHILLS: 0
COUGH: 1
HEMOPTYSIS: 0
SHORTNESS OF BREATH: 1
FEVER: 0
HEADACHES: 1

## 2017-10-30 NOTE — PATIENT INSTRUCTIONS
I have ordered several labs for you to complete before your next appointment as well as a chest x-ray. You should have all of the orders for these in hand when you leave today. I will see you in 5 weeks.

## 2017-10-31 NOTE — PROGRESS NOTES
"      Established Patient    Tonya presents today with the following:    CC: 67F presents for f/u for multiple medical problems including polyneuropathy, myalgia, migraine, T2DM, as well as evaluation of SOB, dry eyes/mouth, nontender dermal nodules.    HPI:     1. Shortness of breath  Pt endorses SOB with exertion and cough productive of dry sputum. She denies hemoptysis, fevers, chills, night sweats, and chest pain with exertion.    2. Type 2 diabetes mellitus with diabetic polyneuropathy, without long-term current use of insulin (CMS-HCC)  Patient did not get her A1c following last visit. Last recorded A1c was 6.7 on July 26. I have since increased her metformin to 500 twice a day.    3. Migraine without status migrainosus, not intractable, unspecified migraine type  Migraine initially improved following prescription of propranolol as prophylactic and sumatriptan as abortive. She has since run out of these medications and now complains of continued migraines.    4. Polyneuropathy (CMS-HCC)  She continues to complain of polyneuropathy with burning pain. At last visit I prescribed gabapentin for this; she states this initially improved and then worsened again. B12 level was ordered after last visit but she did not get this.    5. Multiple skin nodules  Nontender nodules of the anterior upper arms again noted. She has no associated axillary or breast nodules or lumps. She also denies any skin puckering or discoloration of her breasts or any nipple discharge or inversion.     6. Dry eyes / 7. Dry mouth  Complains of dry eyes and dry mouth with associated burning, dry lips. She also complains of worsening vision over time and has an appointment scheduled with an ophthalmologist. This is chronic.    8. Fatigue, unspecified type  Complains of fatigue, but also states that she sometimes feels \"hyper.\" As above, she denies fevers, chills, night sweats. TSH was ordered after last visit but she did not get this.    9. " Myalgia  At last visit, she complained of severe, 8/10 myalgias as well as right sided knee pain and swelling, the latter of which began after she began her abx course for H. pylori. At last visit I discontinued her statin and ordered a CK level. She states the myalgias and knee pain has continued unabated despite this statin discontinuation and she did not get a CK level after the last visit.      Patient Active Problem List    Diagnosis Date Noted   • Melena 07/21/2017   • Type 2 diabetes mellitus (CMS-[facility]) 05/02/2017   • Essential hypertension 05/02/2017   • Migraine without status migrainosus, not intractable 05/02/2017   • Dyslipidemia 05/02/2017   • GERD (gastroesophageal reflux disease) 05/02/2017   • Primary insomnia 05/02/2017   • Hypothyroidism 05/02/2017   • Retinal detachment 02/12/2013       Current Outpatient Prescriptions   Medication Sig Dispense Refill   • acetaminophen (TYLENOL) 500 MG Tab Take 500-1,000 mg by mouth every 6 hours as needed.     • propranolol (INDERAL) 40 MG Tab TAKE 1 TABLET TWICE DAILY 180 Tab 2   • gabapentin (NEURONTIN) 100 MG Cap Take 2 Caps by mouth 3 times a day. 120 Cap 3   • sumatriptan (IMITREX) 100 MG tablet Take 1 Tab by mouth Once PRN for Migraine for up to 1 dose. 20 Tab 3   • levothyroxine (SYNTHROID) 50 MCG Tab TAKE 1 TABLET EVERY DAY 90 Tab 0   • metformin (GLUCOPHAGE) 1000 MG tablet Take 0.5 Tabs by mouth 2 times a day. TAKE 1/2 OF A TAB TWICE PER DAY 30 Tab 3   • losartan-hydrochlorothiazide (HYZAAR) 50-12.5 MG per tablet Take 1 Tab by mouth every day. TAKE 1 TAB BY MOUTH EVERY DAY. 90 Tab 0   • multivitamin (THERAGRAN) TABS Take 1 Tab by mouth every day.       • Bismuth Subsalicylate (PEPTO-BISMOL PO) Take  by mouth.     • hydrocortisone 1 % Cream Please apply 5 times per day to the ulcer in your mouth for 3-5 days or until reduction of symptoms. 1 Tube 0   • omeprazole (PRILOSEC) 40 MG delayed-release capsule Take 1 Cap by mouth every day. 30 Cap 3   • Blood  "Glucose Monitoring Suppl Device Meter: Freestyle glucometer. Sig. Use as directed for blood sugar monitoring twice daily for insulin resistance. 1 Device 0   • Blood Glucose Monitoring Suppl SUPPLIES Misc Test strips order: Test strips for Abbott Freestyle Lite meter. Sig: use bid and prn ssx high or low sugar 200 Strip 11   • Blood Glucose Monitoring Suppl SUPPLIES Misc Lancets order: Lancets  strips for One Touch Ultra 2 meter. Sig: check blood sugars twice daily (Patient taking differently: Lancets order for once a day testing for diabetes E11.9) 200 Each 2   • Blood Glucose Monitoring Suppl SUPPLIES Misc Lancets order: Lancets for Abbott Freestyle Lite meter. Sig: use bid and prn ssx high or low sugar 200 Each 11   • prednisoLONE acetate (PRED FORTE) 1 % Suspension INSTILL 1 DROP INTO BOTH EYES 4 TIMES A DAY AS DIRECTED  1     No current facility-administered medications for this visit.        ROS: As per HPI. Additional pertinent symptoms as noted below.    Review of Systems   Constitutional: Positive for malaise/fatigue. Negative for chills, fever and weight loss.   Eyes: Positive for blurred vision.        Eye dryness.   Respiratory: Positive for cough, sputum production and shortness of breath. Negative for hemoptysis.    Cardiovascular: Negative for chest pain and palpitations.   Gastrointestinal: Negative for abdominal pain, diarrhea, nausea and vomiting.   Genitourinary: Negative for dysuria, flank pain and hematuria.   Musculoskeletal: Positive for joint pain and myalgias.   Skin:        Denies breast puckering, discoloration, nipple inversion, nipple discharge, other breast lumps.   Neurological: Positive for headaches.   Endo/Heme/Allergies: Positive for environmental allergies. Does not bruise/bleed easily.   All other systems reviewed and negative.    /62   Pulse 74   Temp 36.4 °C (97.6 °F)   Ht 1.626 m (5' 4.02\")   Wt 68.4 kg (150 lb 12.8 oz)   SpO2 94%   BMI 25.87 kg/m²     Physical " Exam   Constitutional:  Very pleasant older lady in NAD.  Eyes: No conjunct injection. No scleral icterus.  Neck: Neck supple. Small anterior round mobile area noted, possible thyroid nodule.  Cardiovascular: RRR w/o M/R/G.  Pulmonary/Chest: CTAB. No CVA tenderness.   Musculoskeletal:  1+ periph edema b/l. R knee swollen and tender but non-erythematous.  Neurological: alert and oriented to person, place, and time. Pleasant. Appropriate mood and affect.  Skin: No cyanosis. No superficial lesions noted to visible skin. There are some dermal, mobile, non-tender nodules noted to anterior upper arm, L>R  Breast: normal nipples without discharge expressed and without inversion. No skin discoloration or peau d'orange. No axillary masses.    Note: I have reviewed all pertinent labs and diagnostic tests associated with this visit with specific comments listed under the assessment and plan below    Assessment and Plan    1. Shortness of breath  -EKG ordered in-office. Showed sinus rhythm with inferior T wave changes seen on previous EKG.  -CXR ordered    2. Type 2 diabetes mellitus with diabetic polyneuropathy, without long-term current use of insulin (CMS-HCC)  -POCT A1C ordered. Was 6.6--essentially unchanged from previous.  -continuing metformin at current dosage for now.  -CMP and microalbumin/Cr ratio ordered  -already has opthalmology appointment scheduled at this time    3. Migraine without status migrainosus, not intractable, unspecified migraine type  -propranolol and sumatriptan re-ordered    4. Polyneuropathy (CMS-HCC)  -B12 reordered  -gabapentin increased from 200 mg QHS to 200 mg bid for one week, then 200 mg tid as required   -She does not want trazodone renewed and has none left at home, so less concern for excessive somnolence as side effect of gabapentin without additional trazodone    5. Multiple skin nodules  -Given skin nodules + dry eyes and dry mouth, as well as lack of masses or other concerning  changes of breast or axillae and non-concerning mammography, suspect autoimmune disorder.   -ESR ordered.    6. Dry eyes / 7. Dry mouth  -As above, suspect autoimmune problem.  -LUIS ordered due to suspicion for Sjogren's.    8. Fatigue, unspecified type  -TSH ordered.  -ESR as above    9. Myalgia  -CK level reordered      Followup: Return in about 5 weeks (around 12/4/2017).      Signed by: Adriana Almaraz M.D.

## 2017-11-13 ENCOUNTER — HOSPITAL ENCOUNTER (OUTPATIENT)
Dept: RADIOLOGY | Facility: MEDICAL CENTER | Age: 67
End: 2017-11-13
Attending: STUDENT IN AN ORGANIZED HEALTH CARE EDUCATION/TRAINING PROGRAM
Payer: MEDICARE

## 2017-11-13 DIAGNOSIS — R06.02 SHORTNESS OF BREATH: ICD-10-CM

## 2017-11-13 PROCEDURE — 71020 DX-CHEST-2 VIEWS: CPT

## 2017-11-14 LAB
ALBUMIN SERPL-MCNC: 4.2 G/DL (ref 3.6–4.8)
ALBUMIN/CREAT UR: 5.2 MG/G CREAT (ref 0–30)
ALBUMIN/GLOB SERPL: 1.1 {RATIO} (ref 1.2–2.2)
ALP SERPL-CCNC: 59 IU/L (ref 39–117)
ALT SERPL-CCNC: 14 IU/L (ref 0–32)
ANA SER QL: NEGATIVE
AST SERPL-CCNC: 14 IU/L (ref 0–40)
BILIRUB SERPL-MCNC: 0.7 MG/DL (ref 0–1.2)
BUN SERPL-MCNC: 15 MG/DL (ref 8–27)
BUN/CREAT SERPL: 21 (ref 12–28)
CALCIUM SERPL-MCNC: 9.8 MG/DL (ref 8.7–10.3)
CHLORIDE SERPL-SCNC: 95 MMOL/L (ref 96–106)
CK SERPL-CCNC: 77 U/L (ref 24–173)
CO2 SERPL-SCNC: 25 MMOL/L (ref 18–29)
CREAT SERPL-MCNC: 0.7 MG/DL (ref 0.57–1)
CREAT UR-MCNC: 177 MG/DL
ERYTHROCYTE [SEDIMENTATION RATE] IN BLOOD BY WESTERGREN METHOD: 21 MM/HR (ref 0–40)
GFR SERPLBLD CREATININE-BSD FMLA CKD-EPI: 102 ML/MIN/1.73
GFR SERPLBLD CREATININE-BSD FMLA CKD-EPI: 89 ML/MIN/1.73
GLOBULIN SER CALC-MCNC: 3.7 G/DL (ref 1.5–4.5)
GLUCOSE SERPL-MCNC: 141 MG/DL (ref 65–99)
MICROALBUMIN UR-MCNC: 9.2 UG/ML
POTASSIUM SERPL-SCNC: 4.1 MMOL/L (ref 3.5–5.2)
PROT SERPL-MCNC: 7.9 G/DL (ref 6–8.5)
SODIUM SERPL-SCNC: 138 MMOL/L (ref 134–144)
TSH SERPL DL<=0.005 MIU/L-ACNC: 0.02 UIU/ML (ref 0.45–4.5)
VIT B12 SERPL-MCNC: 1643 PG/ML (ref 211–946)

## 2017-11-21 ENCOUNTER — HOSPITAL ENCOUNTER (EMERGENCY)
Dept: HOSPITAL 8 - ED | Age: 67
Discharge: HOME | End: 2017-11-21
Payer: MEDICARE

## 2017-11-21 VITALS — SYSTOLIC BLOOD PRESSURE: 145 MMHG | DIASTOLIC BLOOD PRESSURE: 80 MMHG

## 2017-11-21 VITALS — WEIGHT: 145.51 LBS | HEIGHT: 64 IN | BODY MASS INDEX: 24.84 KG/M2

## 2017-11-21 DIAGNOSIS — E03.9: ICD-10-CM

## 2017-11-21 DIAGNOSIS — R10.31: Primary | ICD-10-CM

## 2017-11-21 DIAGNOSIS — Z90.49: ICD-10-CM

## 2017-11-21 DIAGNOSIS — E11.9: ICD-10-CM

## 2017-11-21 DIAGNOSIS — I10: ICD-10-CM

## 2017-11-21 LAB
AST SERPL-CCNC: 9 U/L (ref 15–37)
BUN SERPL-MCNC: 14 MG/DL (ref 7–18)
HCT VFR BLD CALC: 39.6 % (ref 34.6–47.8)
HGB BLD-MCNC: 13.8 G/DL (ref 11.7–16.4)
PATH.CAST-FLAG: (no result)
SPERM-FLAG: (no result)
SRC-FLAG: (no result)
WBC # BLD AUTO: 8.1 X10^3/UL (ref 3.4–10)
XTAL-FLAG: (no result)
YLC-FLAG: (no result)

## 2017-11-21 PROCEDURE — 81001 URINALYSIS AUTO W/SCOPE: CPT

## 2017-11-21 PROCEDURE — 99285 EMERGENCY DEPT VISIT HI MDM: CPT

## 2017-11-21 PROCEDURE — 80053 COMPREHEN METABOLIC PANEL: CPT

## 2017-11-21 PROCEDURE — 96374 THER/PROPH/DIAG INJ IV PUSH: CPT

## 2017-11-21 PROCEDURE — 96361 HYDRATE IV INFUSION ADD-ON: CPT

## 2017-11-21 PROCEDURE — 85025 COMPLETE CBC W/AUTO DIFF WBC: CPT

## 2017-11-21 PROCEDURE — 96375 TX/PRO/DX INJ NEW DRUG ADDON: CPT

## 2017-11-21 PROCEDURE — 74177 CT ABD & PELVIS W/CONTRAST: CPT

## 2017-11-21 PROCEDURE — 36415 COLL VENOUS BLD VENIPUNCTURE: CPT

## 2017-11-24 DIAGNOSIS — E88.819 INSULIN RESISTANCE: ICD-10-CM

## 2017-11-27 NOTE — TELEPHONE ENCOUNTER
Last seen: 10/30/17 by Dr. Almaraz  Next appt: 12/04/17 with Dr. Almaraz    Was the patient seen in the last year in this department? Yes   Does patient have an active prescription for medications requested? No   Received Request Via: Pharmacy

## 2017-12-26 DIAGNOSIS — E03.9 HYPOTHYROIDISM, UNSPECIFIED TYPE: ICD-10-CM

## 2017-12-26 NOTE — TELEPHONE ENCOUNTER
Was the patient seen in the last year in this department? Yes     Does patient have an active prescription for medications requested? Yes     Received Request Via: Pharmacy     Last seen 10/30/17

## 2017-12-27 RX ORDER — LEVOTHYROXINE SODIUM 0.03 MG/1
25 TABLET ORAL
Qty: 60 TAB | Refills: 0 | Status: SHIPPED | OUTPATIENT
Start: 2017-12-27 | End: 2018-02-09 | Stop reason: SDUPTHER

## 2017-12-27 NOTE — TELEPHONE ENCOUNTER
Adriana Almaraz M.D.   [facility] 1 hour ago (11:51 AM)     I got a refill request for pt's synthroid and checked her last TSH and it was quite low, below the normal range. I have refilled her synthroid for half the dose (25 mcg per day) and have ordered a repeat TSH and T4 for 6 weeks from now. The patient is [place] speaking. Can someone who speaks [place] please call the patient and explain that she needs to start taking the lower dose I have prescribed and get the labs in 6 weeks. Also, her next appointment is January 8. She is welcome to come to that appointment if she would like to keep it, but IF she would prefer to wait until after the results have returned from her labs (they should be performed around Feb 7th), can you please cancel the Jan appointment and schedule her follow up for after those labs have resulted? Thank you--please let me know if anything was unclear! (Routing comment)      Called pt and l/m to return our call regarding a medication

## 2017-12-27 NOTE — TELEPHONE ENCOUNTER
I received refill request for pt's 50 mcg Qdaily synthroid. I checked her chart and at our last office appointment she had been complaining of fatigue, so I had ordered a TSH. Her TSH returned at .017, well below normal.     At this time I am decreasing Ms. Estes's dose of synthroid by 50% to 25 mcg Qdaily. I will also order a follow up TSH and T4 for 6 weeks from the time of this medication switch to give it a chance to reach a new steady state. As this is a [place]-speaking patient, I will request that a Czech speaking  call her today and ask that she  and begin using the new lower dose of her synthroid, that she get the follow up lab testing in 6 weeks. I will also ensure she gets scheduled for a follow up appointment after the results have returned both to assess her fatigue complaint and see if it has improved with the altered dose, and to further modify her dose of synthroid if necessary.

## 2017-12-29 NOTE — TELEPHONE ENCOUNTER
Printed lab slip with notification that pt needs to get them done before refill can be approved for synthroid mailed to pt

## 2018-01-08 ENCOUNTER — OFFICE VISIT (OUTPATIENT)
Dept: INTERNAL MEDICINE | Facility: MEDICAL CENTER | Age: 68
End: 2018-01-08
Payer: MEDICARE

## 2018-01-08 VITALS
HEIGHT: 64 IN | HEART RATE: 71 BPM | DIASTOLIC BLOOD PRESSURE: 78 MMHG | WEIGHT: 148 LBS | TEMPERATURE: 97.7 F | BODY MASS INDEX: 25.27 KG/M2 | OXYGEN SATURATION: 98 % | SYSTOLIC BLOOD PRESSURE: 142 MMHG

## 2018-01-08 DIAGNOSIS — M79.10 MYALGIA: ICD-10-CM

## 2018-01-08 DIAGNOSIS — E11.69 DYSLIPIDEMIA ASSOCIATED WITH TYPE 2 DIABETES MELLITUS (HCC): ICD-10-CM

## 2018-01-08 DIAGNOSIS — E78.5 DYSLIPIDEMIA ASSOCIATED WITH TYPE 2 DIABETES MELLITUS (HCC): ICD-10-CM

## 2018-01-08 DIAGNOSIS — E03.9 HYPOTHYROIDISM, UNSPECIFIED TYPE: ICD-10-CM

## 2018-01-08 DIAGNOSIS — E11.319 DIABETIC RETINOPATHY OF BOTH EYES ASSOCIATED WITH TYPE 2 DIABETES MELLITUS, MACULAR EDEMA PRESENCE UNSPECIFIED, UNSPECIFIED RETINOPATHY SEVERITY (HCC): ICD-10-CM

## 2018-01-08 DIAGNOSIS — E11.42 TYPE 2 DIABETES MELLITUS WITH DIABETIC POLYNEUROPATHY, WITHOUT LONG-TERM CURRENT USE OF INSULIN (HCC): ICD-10-CM

## 2018-01-08 DIAGNOSIS — H04.123 DRY EYES: ICD-10-CM

## 2018-01-08 PROCEDURE — 99214 OFFICE O/P EST MOD 30 MIN: CPT | Mod: GC | Performed by: INTERNAL MEDICINE

## 2018-01-08 RX ORDER — ROSUVASTATIN CALCIUM 10 MG/1
5 TABLET, COATED ORAL EVERY EVENING
Qty: 60 TAB | Refills: 2 | Status: SHIPPED | OUTPATIENT
Start: 2018-01-08 | End: 2018-02-14 | Stop reason: SINTOL

## 2018-01-08 NOTE — PATIENT INSTRUCTIONS
Today we discussed several of your follow up issues and are doing the following:  -I referred you to Dr. Young with ophthalmology  -I ordered several labs including a CMP, lipid panel, and TSH/T4 (the TSH/T4 should be done February 7th or later)  -I ordered a new low-dose statin for you  -I ordered refresh eye tears for you    I will plan to see you back in 5 weeks.

## 2018-01-08 NOTE — PROGRESS NOTES
Established Patient    Tonya presents today with the following:    CC: Ms. Estes presents today for f/u of multiple chronic medical issues including T2DM, fatigue, dry eyes.    HPI:     # Shortness of breath  -CXR ordered at last visit; since resulted and was completely wnl  -since our last appt she states she has not been experiencing this sx anymore     # Type 2 diabetes mellitus with diabetic polyneuropathy, without long-term current use of insulin (CMS-HCC)  -POCT A1C ordered at last visit, end of October. Was 6.6--essentially unchanged from previous.  -continuing metformin at current dosage for now.  -CMP and microalbumin/Cr ratio ordered at last visit and was wnl  -at last visit, stated she had an ophthalmology appointment already scheduled. At this visit she states this was not the case and had been a misunderstanding. She would like a referral to appointment with Dr. Christiano Young.  -I have made the above referral for diabetic retinopathy and dry/painful eyes.       # Multiple skin nodules  -assessed at last visit; suspected autoimmune problem but ESR and LUIS neg as above  -now resolved     # Dry eyes  -At last visit, suspected autoimmune problem, but ordered LUIS and ESR which were both wnl  -referred to Dr. Christiano Young, ophthalmology, as above     # Fatigue, unspecified type  -at last visit, TSH ordered for fatigue; it was very low at .017 on a dose of 50 mcg Qdaily synthroid  -between last visit and this, after her TSH resulted, I reduced her synthroid to 25 mcg from 50 Qdaily  -at this point she has been on the reduced dose x 5 days. Continues to feel fatigued.  -I explained I wanted her to get TSH/T4 testing on Feb 7, which should give the reduced dose ~5-6 weeks to help her reach a new steady state      # Myalgia  -CK level was reordered at last visit and was wnl  -she has been off statin for several months  -her myalgias are now resolved    Patient Active Problem List    Diagnosis Date Noted      • Melena 07/21/2017   • Type 2 diabetes mellitus (CMS-[facility]) 05/02/2017   • Essential hypertension 05/02/2017   • Migraine without status migrainosus, not intractable 05/02/2017   • Dyslipidemia 05/02/2017   • GERD (gastroesophageal reflux disease) 05/02/2017   • Primary insomnia 05/02/2017   • Hypothyroidism 05/02/2017   • Retinal detachment 02/12/2013       Current Outpatient Prescriptions   Medication Sig Dispense Refill   • levothyroxine (SYNTHROID) 25 MCG Tab Take 1 Tab by mouth every day. 60 Tab 0   • metformin (GLUCOPHAGE) 1000 MG tablet TAKE 1/2 TABLET TWICE DAILY 90 Tab 3   • propranolol (INDERAL) 40 MG Tab TAKE 1 TABLET TWICE DAILY 180 Tab 2   • gabapentin (NEURONTIN) 100 MG Cap Take 2 Caps by mouth 3 times a day. 120 Cap 3   • sumatriptan (IMITREX) 100 MG tablet Take 1 Tab by mouth Once PRN for Migraine for up to 1 dose. 20 Tab 3   • Blood Glucose Monitoring Suppl Device Meter: Freestyle glucometer. Sig. Use as directed for blood sugar monitoring twice daily for insulin resistance. 1 Device 0   • losartan-hydrochlorothiazide (HYZAAR) 50-12.5 MG per tablet Take 1 Tab by mouth every day. TAKE 1 TAB BY MOUTH EVERY DAY. 90 Tab 0   • Blood Glucose Monitoring Suppl SUPPLIES Misc Lancets order: Lancets for Abbott Freestyle Lite meter. Sig: use bid and prn ssx high or low sugar 200 Each 11   • multivitamin (THERAGRAN) TABS Take 1 Tab by mouth every day.       • acetaminophen (TYLENOL) 500 MG Tab Take 500-1,000 mg by mouth every 6 hours as needed.     • Bismuth Subsalicylate (PEPTO-BISMOL PO) Take  by mouth.     • hydrocortisone 1 % Cream Please apply 5 times per day to the ulcer in your mouth for 3-5 days or until reduction of symptoms. 1 Tube 0   • omeprazole (PRILOSEC) 40 MG delayed-release capsule Take 1 Cap by mouth every day. 30 Cap 3   • Blood Glucose Monitoring Suppl SUPPLIES Misc Test strips order: Test strips for Abbott Freestyle Lite meter. Sig: use bid and prn ssx high or low sugar 200 Strip 11   •  "Blood Glucose Monitoring Suppl SUPPLIES Misc Lancets order: Lancets  strips for One Touch Ultra 2 meter. Sig: check blood sugars twice daily (Patient taking differently: Lancets order for once a day testing for diabetes E11.9) 200 Each 2   • prednisoLONE acetate (PRED FORTE) 1 % Suspension INSTILL 1 DROP INTO BOTH EYES 4 TIMES A DAY AS DIRECTED  1     No current facility-administered medications for this visit.        ROS: As per HPI. Additional pertinent symptoms as noted below.    ROS   Endorses fatigue, dry/painful eyes, blurred vision. Denies skin nodules, myalgias, SOB, or any other sx.    /78   Pulse 71   Temp 36.5 °C (97.7 °F)   Ht 1.626 m (5' 4.02\")   Wt 67.1 kg (148 lb)   SpO2 98%   BMI 25.39 kg/m²     Physical Exam   Constitutional: She is oriented to person, place, and time. She appears well-developed and well-nourished. No distress.   HENT:   Head: Normocephalic and atraumatic.   Eyes: Conjunctivae are normal. Right eye exhibits no discharge. Left eye exhibits no discharge. No scleral icterus.   Cardiovascular: Normal rate, regular rhythm and normal heart sounds.  Exam reveals no gallop and no friction rub.    No murmur heard.  Pulmonary/Chest: Effort normal and breath sounds normal. No respiratory distress. She has no wheezes. She has no rales.   Abdominal: Soft. Bowel sounds are normal. She exhibits no distension. There is no tenderness. There is no guarding.   Musculoskeletal: She exhibits edema. She exhibits no tenderness or deformity.   Trace to 1+ b/l periph edema.   Neurological: She is alert and oriented to person, place, and time.   Skin: Skin is warm and dry. No rash noted. She is not diaphoretic. No erythema. No pallor.   Psychiatric: She has a normal mood and affect. Her behavior is normal. Judgment and thought content normal.       Note: I have reviewed all pertinent labs and diagnostic tests associated with this visit with specific comments listed under the assessment and plan " below    No visits with results within 1 Month(s) from this visit.   Latest known visit with results is:   Orders Only on 11/13/2017   Component Date Value Ref Range Status   • Glucose 11/14/2017 141* 65 - 99 mg/dL Final   • Bun 11/14/2017 15  8 - 27 mg/dL Final   • Creatinine 11/14/2017 0.70  0.57 - 1.00 mg/dL Final   • GFR If Non  11/14/2017 89  >59 mL/min/1.73 Final   • GFR If  11/14/2017 102  >59 mL/min/1.73 Final   • Bun-Creatinine Ratio 11/14/2017 21  12 - 28 Final   • Sodium 11/14/2017 138  134 - 144 mmol/L Final   • Potassium 11/14/2017 4.1  3.5 - 5.2 mmol/L Final   • Chloride 11/14/2017 95* 96 - 106 mmol/L Final   • Co2 11/14/2017 25  18 - 29 mmol/L Final   • Calcium 11/14/2017 9.8  8.7 - 10.3 mg/dL Final   • Total Protein 11/14/2017 7.9  6.0 - 8.5 g/dL Final   • Albumin 11/14/2017 4.2  3.6 - 4.8 g/dL Final   • Globulin 11/14/2017 3.7  1.5 - 4.5 g/dL Final   • A-G Ratio 11/14/2017 1.1* 1.2 - 2.2 Final   • Total Bilirubin 11/14/2017 0.7  0.0 - 1.2 mg/dL Final   • Alkaline Phosphatase 11/14/2017 59  39 - 117 IU/L Final   • AST(SGOT) 11/14/2017 14  0 - 40 IU/L Final   • ALT(SGPT) 11/14/2017 14  0 - 32 IU/L Final   • TSH 11/14/2017 0.017* 0.450 - 4.500 uIU/mL Final   • Antinuclear Antibody Direct 11/14/2017 Negative  Negative Final   • Sed Rate Westergren 11/14/2017 21  0 - 40 mm/hr Final   • CPK Total 11/14/2017 77  24 - 173 U/L Final   • Vitamin B12 -True Cobalamin 11/14/2017 1643* 211 - 946 pg/mL Final         Assessment and Plan     # Type 2 diabetes mellitus with diabetic polyneuropathy, without long-term current use of insulin (CMS-HCC)  -cont metformin at current dosage   -Referred to Dr. Christiano Young, ophthalmologist, for diabetic retinopathy and dry/painful eyes.   -CMP  -lipid panel       # Dry eyes  -referred to Dr. Christiano Young, ophthalmology, as above  -ordered Refresh eye drops     # Fatigue, unspecified type  -at last visit, TSH ordered for fatigue; it was very low  at .017 on a dose of 50 mcg Qdaily synthroid  -between last visit and this, after her TSH resulted, I reduced her synthroid to 25 mcg from 50 Qdaily  -at this point she has been on the reduced dose x 5 days. Continues to feel fatigued.  -I explained I wanted her to get TSH/T4 testing on Feb 7, which should give the reduced dose ~5-6 weeks to help her reach a new steady state  -printed out orders for reminder to get labs      # Myalgia  -restarting statin on rosuvastatin 5 mg Qdaily  -will reassess in 5 weeks for myalgias; if none will increase dose rosuvastatin to moderate intensity    Followup: Return in about 5 weeks (around 2/12/2018).      Signed by: Adriana Almaraz M.D.

## 2018-02-08 LAB — T4 FREE SERPL-MCNC: 1.29 NG/DL (ref 0.82–1.77)

## 2018-02-12 NOTE — TELEPHONE ENCOUNTER
Last seen: 01/08/18 by Dr. Almaraz  Next appt: 02/14/18 with Dr. Almaraz    Was the patient seen in the last year in this department? Yes   Does patient have an active prescription for medications requested? No   Received Request Via: Pharmacy

## 2018-02-14 ENCOUNTER — OFFICE VISIT (OUTPATIENT)
Dept: INTERNAL MEDICINE | Facility: MEDICAL CENTER | Age: 68
End: 2018-02-14
Payer: MEDICARE

## 2018-02-14 VITALS
HEIGHT: 64 IN | DIASTOLIC BLOOD PRESSURE: 58 MMHG | TEMPERATURE: 97.3 F | WEIGHT: 146 LBS | OXYGEN SATURATION: 96 % | SYSTOLIC BLOOD PRESSURE: 128 MMHG | BODY MASS INDEX: 24.92 KG/M2 | HEART RATE: 72 BPM

## 2018-02-14 DIAGNOSIS — E03.9 HYPOTHYROIDISM, UNSPECIFIED TYPE: ICD-10-CM

## 2018-02-14 DIAGNOSIS — K21.9 GASTROESOPHAGEAL REFLUX DISEASE, ESOPHAGITIS PRESENCE NOT SPECIFIED: ICD-10-CM

## 2018-02-14 DIAGNOSIS — R05.3 CHRONIC COUGH: ICD-10-CM

## 2018-02-14 DIAGNOSIS — T46.6X5A STATIN MYOPATHY: ICD-10-CM

## 2018-02-14 DIAGNOSIS — R06.2 WHEEZING: ICD-10-CM

## 2018-02-14 DIAGNOSIS — E11.42 TYPE 2 DIABETES MELLITUS WITH DIABETIC POLYNEUROPATHY, WITHOUT LONG-TERM CURRENT USE OF INSULIN (HCC): ICD-10-CM

## 2018-02-14 DIAGNOSIS — G72.0 STATIN MYOPATHY: ICD-10-CM

## 2018-02-14 LAB
HBA1C MFR BLD: 6 % (ref ?–5.8)
INT CON NEG: NEGATIVE
INT CON POS: POSITIVE

## 2018-02-14 PROCEDURE — 83036 HEMOGLOBIN GLYCOSYLATED A1C: CPT | Performed by: INTERNAL MEDICINE

## 2018-02-14 PROCEDURE — 99214 OFFICE O/P EST MOD 30 MIN: CPT | Mod: GC | Performed by: INTERNAL MEDICINE

## 2018-02-14 RX ORDER — RANITIDINE 150 MG/1
150 TABLET ORAL 2 TIMES DAILY
Qty: 60 TAB | Refills: 3 | Status: SHIPPED | OUTPATIENT
Start: 2018-02-14 | End: 2018-04-23 | Stop reason: SDUPTHER

## 2018-02-14 RX ORDER — LEVOTHYROXINE SODIUM 0.03 MG/1
TABLET ORAL
Qty: 30 TAB | Refills: 0 | Status: SHIPPED | OUTPATIENT
Start: 2018-02-14 | End: 2018-03-12 | Stop reason: SDUPTHER

## 2018-02-14 NOTE — PROGRESS NOTES
Established Patient    Tonya presents today with the following:    CC: Ms. Estes is 67F here for f/u regarding her chronic medical issues including hypoTH and T2DM.     HPI:     # Hypothyroidism:  -at last visit, TSH and T4 ordered after her synthroid had been decreased from 50 mcg/day to 25 mcg/day due to a TSH finding of .017  -however, we currently only have results for her T4, not for her TSH    # T2DM:   -at last visit, CMP and lipid panel ordered but unfortunately we do not have results for these  -I also referred her for ophthalmology for assessment of retinopathy as well as eye pain; this referral is still pending  -Last A1C 10/30 was 6.6%; POCT A1C ordered in office today was 6%  -she reports her diet is good with high vegetable intake and has lost weight as a result  -she denies neuropathic sx on current dose of jj  -she is also tolerating metformin well without any GI sx    # Myopathy:   -statin previously discontinued due to myopathy   -at last visit we restarted her on rosuvastatin  -today she reports continued joint and muscle aches occurring primarily in her RLE which began after restarting rosuvastatin    # Chronic cough  # Wheezing  -she complains of chronic nonproductive cough worse at night and with cold weather and associated with intermittent wheezing  -PFTs performed in 09/2017 and were normal  -also has h/o GERD, previously with pos H. Pylori for which she was referred to GI. Remains on 40 mg omeprazole Qd.     Patient Active Problem List    Diagnosis Date Noted   • Melena 07/21/2017   • Type 2 diabetes mellitus (CMS-HCC) 05/02/2017   • Essential hypertension 05/02/2017   • Migraine without status migrainosus, not intractable 05/02/2017   • Dyslipidemia 05/02/2017   • GERD (gastroesophageal reflux disease) 05/02/2017   • Primary insomnia 05/02/2017   • Hypothyroidism 05/02/2017   • Retinal detachment 02/12/2013       Current Outpatient Prescriptions   Medication Sig Dispense Refill    • rosuvastatin (CRESTOR) 10 MG Tab Take 0.5 Tabs by mouth every evening. 60 Tab 2   • levothyroxine (SYNTHROID) 25 MCG Tab Take 1 Tab by mouth every day. 60 Tab 0   • metformin (GLUCOPHAGE) 1000 MG tablet TAKE 1/2 TABLET TWICE DAILY 90 Tab 3   • propranolol (INDERAL) 40 MG Tab TAKE 1 TABLET TWICE DAILY 180 Tab 2   • gabapentin (NEURONTIN) 100 MG Cap Take 2 Caps by mouth 3 times a day. 120 Cap 3   • sumatriptan (IMITREX) 100 MG tablet Take 1 Tab by mouth Once PRN for Migraine for up to 1 dose. 20 Tab 3   • Blood Glucose Monitoring Suppl Device Meter: Freestyle glucometer. Sig. Use as directed for blood sugar monitoring twice daily for insulin resistance. 1 Device 0   • losartan-hydrochlorothiazide (HYZAAR) 50-12.5 MG per tablet Take 1 Tab by mouth every day. TAKE 1 TAB BY MOUTH EVERY DAY. 90 Tab 0   • Blood Glucose Monitoring Suppl SUPPLIES Misc Lancets order: Lancets for Abbott Freestyle Lite meter. Sig: use bid and prn ssx high or low sugar 200 Each 11   • omeprazole (PRILOSEC) 40 MG delayed-release capsule TAKE 1 CAP BY MOUTH EVERY DAY. 90 Cap 3   • polyvinyl alcohol-povidone (REFRESH) 1.4-0.6 % ophthalmic solution Place 1 Drop in both eyes as needed. 1 Each 3   • acetaminophen (TYLENOL) 500 MG Tab Take 500-1,000 mg by mouth every 6 hours as needed.     • Bismuth Subsalicylate (PEPTO-BISMOL PO) Take  by mouth.     • hydrocortisone 1 % Cream Please apply 5 times per day to the ulcer in your mouth for 3-5 days or until reduction of symptoms. 1 Tube 0   • omeprazole (PRILOSEC) 40 MG delayed-release capsule Take 1 Cap by mouth every day. 30 Cap 3   • Blood Glucose Monitoring Suppl SUPPLIES Misc Test strips order: Test strips for Abbott Freestyle Lite meter. Sig: use bid and prn ssx high or low sugar 200 Strip 11   • Blood Glucose Monitoring Suppl SUPPLIES Misc Lancets order: Lancets  strips for One Touch Ultra 2 meter. Sig: check blood sugars twice daily (Patient taking differently: Lancets order for once a day  "testing for diabetes E11.9) 200 Each 2   • prednisoLONE acetate (PRED FORTE) 1 % Suspension INSTILL 1 DROP INTO BOTH EYES 4 TIMES A DAY AS DIRECTED  1   • multivitamin (THERAGRAN) TABS Take 1 Tab by mouth every day.         No current facility-administered medications for this visit.        ROS: As per HPI. Additional pertinent symptoms as noted below.    ROS   Endorses chronic cough and wheezing, RLE mm pains/aches, b/l eye pain/blurred vision. She denies any and all other sx.      /58   Pulse 72   Temp 36.3 °C (97.3 °F)   Ht 1.626 m (5' 4.02\")   Wt 66.2 kg (146 lb)   SpO2 96%   BMI 25.05 kg/m²     Physical Exam   Constitutional: She is oriented to person, place, and time. She appears well-developed and well-nourished. No distress.   HENT:   Head: Normocephalic and atraumatic.   Right Ear: External ear normal.   Left Ear: External ear normal.   Eyes: Conjunctivae are normal. Right eye exhibits no discharge. Left eye exhibits no discharge. No scleral icterus.   Cardiovascular: Normal rate, regular rhythm and normal heart sounds.  Exam reveals no gallop and no friction rub.    No murmur heard.  Pulmonary/Chest: Effort normal and breath sounds normal. No respiratory distress. She has no wheezes. She has no rales. She exhibits no tenderness.   Abdominal: Soft. Bowel sounds are normal. She exhibits no distension. There is no tenderness. There is no rebound and no guarding.   Musculoskeletal: She exhibits no edema, tenderness or deformity.   Neurological: She is alert and oriented to person, place, and time.   Skin: Skin is warm and dry. No rash noted. She is not diaphoretic. No erythema. No pallor.   Psychiatric: She has a normal mood and affect. Her behavior is normal. Judgment and thought content normal.   Vitals reviewed.      Note: I have reviewed all pertinent labs and diagnostic tests associated with this visit with specific comments listed under the assessment and plan below    Assessment and " Plan    # Hypothyroidism:  -at last visit, TSH and T4 ordered after her synthroid had been decreased from 50 mcg/day to 25 mcg/day due to a TSH finding of .017  -she did not get labs as [facility] incorrectly told her her labs had   -reprinted her lab orders and asked she get them in next 2 weeks  -also reordered 30 day synthroid at 25 mcg/day dosage    # T2DM:   -ophthalmology referral is still pending and I have given her the general scheduling number to make an appt if they do not call to schedule  -A1C ordered in office today was 6%, very well controlled, and she reports recent weight loss  -she denies neuropathic sx on current dose of jj  -will continue medications as is for now    # Statin myopathy:   -today will discontinue rosuvastatin given sx of muscle aches/pains again after restarting  -will wait on lipid panel before starting another medication    # Chronic cough  # Wheezing  # GERD  -PFTs were normal, but if we have high suspicion of asthma next step to confirm might be methacholine challenge vs empiric albuterol add'n  -however, at this time, GERD remains a likely etiology as we already know she has this problem. She is currently on 40 mg omeprazole.  -have ordered ranitidine 150 mg bid to be taken in add'n to omeprazole  -will f/u in 5 weeks to assess whether both medications together improve her sx    Followup: Return in about 5 weeks (around 3/21/2018) for Short.      Signed by: Adriana Almaraz M.D.

## 2018-02-14 NOTE — PATIENT INSTRUCTIONS
Today we are sending you out with re-prints of your labs including your CMP, lipid panel, and TSH. Please get these done in the next 2 weeks. We will stop your rosuvastatin because it may be causing your muscle pain. We will order ranitidine for you to be taken with your omeprazole, with the hope that it can help your chronic cough. Call the general scheduling number [phone] to schedule your ophthalmology appointment if they have not called you yet. I will see you again in 5 weeks.

## 2018-02-16 LAB
ALBUMIN SERPL-MCNC: 4.2 G/DL (ref 3.6–4.8)
ALBUMIN/GLOB SERPL: 1.2 {RATIO} (ref 1.2–2.2)
ALP SERPL-CCNC: 55 IU/L (ref 39–117)
ALT SERPL-CCNC: 15 IU/L (ref 0–32)
AST SERPL-CCNC: 16 IU/L (ref 0–40)
BILIRUB SERPL-MCNC: 0.3 MG/DL (ref 0–1.2)
BUN SERPL-MCNC: 23 MG/DL (ref 8–27)
BUN/CREAT SERPL: 28 (ref 12–28)
CALCIUM SERPL-MCNC: 9.9 MG/DL (ref 8.7–10.3)
CHLORIDE SERPL-SCNC: 100 MMOL/L (ref 96–106)
CHOLEST SERPL-MCNC: 212 MG/DL (ref 100–199)
CO2 SERPL-SCNC: 25 MMOL/L (ref 18–29)
CREAT SERPL-MCNC: 0.81 MG/DL (ref 0.57–1)
GFR SERPLBLD CREATININE-BSD FMLA CKD-EPI: 74 ML/MIN/1.73
GFR SERPLBLD CREATININE-BSD FMLA CKD-EPI: 86 ML/MIN/1.73
GLOBULIN SER CALC-MCNC: 3.5 G/DL (ref 1.5–4.5)
GLUCOSE SERPL-MCNC: 132 MG/DL (ref 65–99)
HDLC SERPL-MCNC: 34 MG/DL
LABORATORY COMMENT REPORT: ABNORMAL
LDLC SERPL CALC-MCNC: ABNORMAL MG/DL (ref 0–99)
POTASSIUM SERPL-SCNC: 4.3 MMOL/L (ref 3.5–5.2)
PROT SERPL-MCNC: 7.7 G/DL (ref 6–8.5)
SODIUM SERPL-SCNC: 140 MMOL/L (ref 134–144)
T4 FREE SERPL-MCNC: 1.18 NG/DL (ref 0.82–1.77)
TRIGL SERPL-MCNC: 469 MG/DL (ref 0–149)
TSH SERPL DL<=0.005 MIU/L-ACNC: 2.24 UIU/ML (ref 0.45–4.5)
VLDLC SERPL CALC-MCNC: ABNORMAL MG/DL (ref 5–40)

## 2018-03-13 ENCOUNTER — TELEPHONE (OUTPATIENT)
Dept: INTERNAL MEDICINE | Facility: MEDICAL CENTER | Age: 68
End: 2018-03-13

## 2018-03-13 NOTE — TELEPHONE ENCOUNTER
Called and updated pt re: her lab results showing worsened DLD, normal thyroid functioning, normal CMP, and that she will require increased dose rosuvastatin if she is tolerating it well at next appointment and we should keep with current lowered dose of synthroid. Results were translated to pt through her partner (who she permits to hear her medical information and accompanies her to every appointment) as he is more fluent in English than she. I also stated I would go over these results again at our upcoming appointment.

## 2018-03-21 ENCOUNTER — OFFICE VISIT (OUTPATIENT)
Dept: INTERNAL MEDICINE | Facility: MEDICAL CENTER | Age: 68
End: 2018-03-21
Payer: MEDICARE

## 2018-03-21 VITALS
SYSTOLIC BLOOD PRESSURE: 130 MMHG | OXYGEN SATURATION: 94 % | TEMPERATURE: 97.3 F | WEIGHT: 150.6 LBS | BODY MASS INDEX: 25.71 KG/M2 | HEART RATE: 67 BPM | HEIGHT: 64 IN | DIASTOLIC BLOOD PRESSURE: 76 MMHG

## 2018-03-21 DIAGNOSIS — R05.3 CHRONIC COUGH: ICD-10-CM

## 2018-03-21 DIAGNOSIS — K21.9 GASTROESOPHAGEAL REFLUX DISEASE, ESOPHAGITIS PRESENCE NOT SPECIFIED: ICD-10-CM

## 2018-03-21 DIAGNOSIS — Z23 NEED FOR VACCINATION: ICD-10-CM

## 2018-03-21 DIAGNOSIS — Z78.0 POST-MENOPAUSE: ICD-10-CM

## 2018-03-21 DIAGNOSIS — G72.0 STATIN MYOPATHY: ICD-10-CM

## 2018-03-21 DIAGNOSIS — E03.9 HYPOTHYROIDISM, UNSPECIFIED TYPE: ICD-10-CM

## 2018-03-21 DIAGNOSIS — Z00.00 HEALTHCARE MAINTENANCE: ICD-10-CM

## 2018-03-21 DIAGNOSIS — T46.6X5A STATIN MYOPATHY: ICD-10-CM

## 2018-03-21 DIAGNOSIS — E78.5 DYSLIPIDEMIA: ICD-10-CM

## 2018-03-21 PROCEDURE — 99214 OFFICE O/P EST MOD 30 MIN: CPT | Mod: GC | Performed by: INTERNAL MEDICINE

## 2018-03-21 RX ORDER — OMEPRAZOLE 40 MG/1
40 CAPSULE, DELAYED RELEASE ORAL DAILY
Qty: 90 CAP | Refills: 3 | Status: SHIPPED | OUTPATIENT
Start: 2018-03-21 | End: 2018-12-24 | Stop reason: SDUPTHER

## 2018-03-21 RX ORDER — FENOFIBRATE 130 MG/1
130 CAPSULE ORAL EVERY MORNING
Qty: 60 CAP | Refills: 5 | Status: SHIPPED | OUTPATIENT
Start: 2018-03-21 | End: 2018-04-05

## 2018-03-21 ASSESSMENT — ENCOUNTER SYMPTOMS
HEMOPTYSIS: 0
DIZZINESS: 0
SPUTUM PRODUCTION: 1
COUGH: 1
MYALGIAS: 1
DOUBLE VISION: 0
SORE THROAT: 1
NAUSEA: 0
ABDOMINAL PAIN: 0
CHILLS: 1
VOMITING: 0
DEPRESSION: 0
BLURRED VISION: 1
HEADACHES: 1
SINUS PAIN: 0
FEVER: 0
PALPITATIONS: 0
WHEEZING: 0
SHORTNESS OF BREATH: 0

## 2018-03-21 ASSESSMENT — LIFESTYLE VARIABLES: SUBSTANCE_ABUSE: 0

## 2018-03-21 NOTE — PROGRESS NOTES
Established Patient    Tonya presents today with the following:    CC: Ms. Estes returns for f/u regarding her chronic issues including statin myopathy, hypothyroidism, and GERD.    HPI:   At last visit, we discussed statin myopathy, for which we d/c'ed her rosuvastatin and ordered a lipid panel showing Chol 212//HDL 34; her hypothyroidism with normal TSH/T4 since on 25 mcg/day synthroid; and her GERD/chronic cough/wheeze for which we started 150 bid ranitidine in add'n to PPI.     # Myalgias: stopped rosuvastatin at last visit as above. However, still having myalgias. They are only mildly improved since stopping it.     # DLD: high total chol and triglycerides and low HDL. Does not tolerate statins due to statin myopathy even with rosuvastatin.     # cough: ongoing cough, unchanged since starting ranitidine in add'n to omeprazole. It is worse at night, and occurs approx 3 nights per week.    # Hypothyroidism: normal recent thyroid testing on current dose of 25 mcg Qd synthroid.    Patient Active Problem List    Diagnosis Date Noted   • Melena 07/21/2017   • Type 2 diabetes mellitus (CMS-[facility]) 05/02/2017   • Essential hypertension 05/02/2017   • Migraine without status migrainosus, not intractable 05/02/2017   • Dyslipidemia 05/02/2017   • GERD (gastroesophageal reflux disease) 05/02/2017   • Primary insomnia 05/02/2017   • Hypothyroidism 05/02/2017   • Retinal detachment 02/12/2013       Current Outpatient Prescriptions   Medication Sig Dispense Refill   • levothyroxine (SYNTHROID) 25 MCG Tab TAKE 1 TABLET EVERY DAY 60 Tab 6   • raNITidine (ZANTAC) 150 MG Tab Take 1 Tab by mouth 2 times a day. 60 Tab 3   • omeprazole (PRILOSEC) 40 MG delayed-release capsule TAKE 1 CAP BY MOUTH EVERY DAY. 90 Cap 3   • polyvinyl alcohol-povidone (REFRESH) 1.4-0.6 % ophthalmic solution Place 1 Drop in both eyes as needed. 1 Each 3   • metformin (GLUCOPHAGE) 1000 MG tablet TAKE 1/2 TABLET TWICE DAILY 90 Tab 3   •  acetaminophen (TYLENOL) 500 MG Tab Take 500-1,000 mg by mouth every 6 hours as needed.     • propranolol (INDERAL) 40 MG Tab TAKE 1 TABLET TWICE DAILY 180 Tab 2   • gabapentin (NEURONTIN) 100 MG Cap Take 2 Caps by mouth 3 times a day. 120 Cap 3   • sumatriptan (IMITREX) 100 MG tablet Take 1 Tab by mouth Once PRN for Migraine for up to 1 dose. 20 Tab 3   • Bismuth Subsalicylate (PEPTO-BISMOL PO) Take  by mouth.     • Blood Glucose Monitoring Suppl Device Meter: Freestyle glucometer. Sig. Use as directed for blood sugar monitoring twice daily for insulin resistance. 1 Device 0   • Blood Glucose Monitoring Suppl SUPPLIES Misc Test strips order: Test strips for Abbott Freestyle Lite meter. Sig: use bid and prn ssx high or low sugar 200 Strip 11   • losartan-hydrochlorothiazide (HYZAAR) 50-12.5 MG per tablet Take 1 Tab by mouth every day. TAKE 1 TAB BY MOUTH EVERY DAY. 90 Tab 0   • Blood Glucose Monitoring Suppl SUPPLIES Misc Lancets order: Lancets  strips for One Touch Ultra 2 meter. Sig: check blood sugars twice daily (Patient taking differently: Lancets order for once a day testing for diabetes E11.9) 200 Each 2   • Blood Glucose Monitoring Suppl SUPPLIES Misc Lancets order: Lancets for Abbott Freestyle Lite meter. Sig: use bid and prn ssx high or low sugar 200 Each 11   • prednisoLONE acetate (PRED FORTE) 1 % Suspension INSTILL 1 DROP INTO BOTH EYES 4 TIMES A DAY AS DIRECTED  1   • multivitamin (THERAGRAN) TABS Take 1 Tab by mouth every day.       • hydrocortisone 1 % Cream Please apply 5 times per day to the ulcer in your mouth for 3-5 days or until reduction of symptoms. 1 Tube 0   • omeprazole (PRILOSEC) 40 MG delayed-release capsule Take 1 Cap by mouth every day. 30 Cap 3     No current facility-administered medications for this visit.        ROS: As per HPI. Additional pertinent symptoms as noted below.    Review of Systems   Constitutional: Positive for chills. Negative for fever.   HENT: Positive for sore  "throat. Negative for congestion and sinus pain.         Occasional sore throat   Eyes: Positive for blurred vision. Negative for double vision.   Respiratory: Positive for cough and sputum production. Negative for hemoptysis, shortness of breath and wheezing.    Cardiovascular: Negative for chest pain and palpitations.        Area of chest tenderness   Gastrointestinal: Negative for abdominal pain, nausea and vomiting.   Genitourinary: Negative for dysuria and hematuria.   Musculoskeletal: Positive for joint pain and myalgias.        B/l knee pain   Skin: Negative for itching and rash.   Neurological: Positive for headaches. Negative for dizziness.   Psychiatric/Behavioral: Negative for depression and substance abuse.         /76   Pulse 67   Temp 36.3 °C (97.3 °F)   Ht 1.626 m (5' 4.02\")   Wt 68.3 kg (150 lb 9.6 oz)   SpO2 94%   BMI 25.83 kg/m²     Physical Exam   Constitutional: She is oriented to person, place, and time. She appears well-developed and well-nourished. No distress.   Elderly woman in [facility], very pleasant, appears much younger than stated age.   HENT:   Head: Normocephalic and atraumatic.   Right Ear: External ear normal.   Left Ear: External ear normal.   Eyes: Conjunctivae are normal. Right eye exhibits no discharge. Left eye exhibits no discharge. No scleral icterus.   Cardiovascular: Normal rate, regular rhythm and normal heart sounds.  Exam reveals no gallop and no friction rub.    No murmur heard.  Pulmonary/Chest: Effort normal and breath sounds normal. No respiratory distress. She has no wheezes. She has no rales.   Abdominal: Soft. Bowel sounds are normal. She exhibits no distension. There is no tenderness. There is no rebound and no guarding.   Musculoskeletal: She exhibits no edema, tenderness or deformity.   Neurological: She is alert and oriented to person, place, and time.   Skin: Skin is warm and dry. No rash noted. She is not diaphoretic. No erythema. No pallor.   "   Psychiatric: She has a normal mood and affect. Her behavior is normal. Judgment and thought content normal.       Note: I have reviewed all pertinent labs and diagnostic tests associated with this visit with specific comments listed under the assessment and plan below    Assessment and Plan  Encounter Diagnoses   Name Primary?   • Dyslipidemia    • Statin myopathy    • Need for vaccination    • Chronic cough    • Hypothyroidism, unspecified type    • Healthcare maintenance    • Gastroesophageal reflux disease, esophagitis presence not specified    • Post-menopause      # Hypothyroidism  -continuing on current dose 25 mcg Qd given normal thyroid testing on it. Refilled at this dose on 3/12.     # DLD  # Myalgias  # Statin myopathy  -does not tolerate statins d/t statin myopathy; in fact has ongoing myalgias only mildly improved since stopping rosuvastatin, likely also due to this  -she also has ongoing DLD; triglyceride level is worst, in 400s  -ordered CK level  -ordered fenofibrate (Antara) 130 mg Qd for DLD.  -ordered lipid panel for 4 weeks from now to assess for improvement on fenofibrate    # Post-menopause  # Healthcare maintenance  # Need for vaccination  -DEXA ordered  -recommended she get Shingrix and PPSV23 at her local pharmacy    # GERD  # Chronic cough  -pt does have h/o GERD for which she is on omeprazole; at last visit we felt cough may be due to this, and added ranitidine. However, she reports no improvement with add'n of ranitidine. She also reports that she had previous improvement of her cough on an inhaler. 3 nights per week coughing.  -moderate to high suspicion cough-predominant asthma, despite previous normal PFTs  -ordered methacholine challenge test to rule in or out          Followup: Return in about 5 weeks (around 4/25/2018).      Signed by: Adriana Almaraz M.D.

## 2018-03-21 NOTE — PATIENT INSTRUCTIONS
"We have ordered some labs for you, an imaging test to look for osteoporosis, and a \"methacholine challenge test\" to see if you have asthma. We have also ordered a new medication for you called fenofibrate for your cholesterol. The cholesterol test you should do in one month to see if we have seen improvement on the fenofibrate. I also reordered your prilosec.  "

## 2018-04-04 ENCOUNTER — TELEPHONE (OUTPATIENT)
Dept: INTERNAL MEDICINE | Facility: MEDICAL CENTER | Age: 68
End: 2018-04-04

## 2018-04-04 NOTE — TELEPHONE ENCOUNTER
1. Caller Name: Juan Pablo's spouse                      Call Back Number: [phone] (home)       2. Message: Bahman called and stated he received a letter from [employer], notifying the Fenofibrate is nor cover. Please change to a different rx.     3. Patient approves office to leave a detailed voicemail/MyChart message: N\A

## 2018-04-05 ENCOUNTER — TELEPHONE (OUTPATIENT)
Dept: INTERNAL MEDICINE | Facility: MEDICAL CENTER | Age: 68
End: 2018-04-05

## 2018-04-05 DIAGNOSIS — E78.1 HYPERTRIGLYCERIDEMIA: ICD-10-CM

## 2018-04-05 DIAGNOSIS — E78.5 DYSLIPIDEMIA: ICD-10-CM

## 2018-04-05 RX ORDER — GEMFIBROZIL 600 MG/1
600 TABLET, FILM COATED ORAL 2 TIMES DAILY
Qty: 60 TAB | Refills: 7 | Status: SHIPPED | OUTPATIENT
Start: 2018-04-05 | End: 2018-09-19 | Stop reason: SDUPTHER

## 2018-04-06 NOTE — TELEPHONE ENCOUNTER
Received notification from patient's  that per her insurance company, the fenofibrate I started at her last visit is not covered. She has significant hypertriglyceridemia and cannot tolerate statins due to recurrent statin myopathy, even after trialing a statin switch. I will prescribe gemfibrozil instead, and she does not require new labs beforehand; she had a 2/15/18 CMP demonstrating good renal and hepatic function.

## 2018-04-10 ENCOUNTER — TELEPHONE (OUTPATIENT)
Dept: INTERNAL MEDICINE | Facility: MEDICAL CENTER | Age: 68
End: 2018-04-10

## 2018-04-10 NOTE — TELEPHONE ENCOUNTER
Pharmacy Name: [facility]    Pharmacy Phone#: [phone]    Pharmacy Fax#: [phone]    Request received via: fax    Message: per pharmacy rxs for gemfibrozil 600mg and Rosuvastatin 10mg tab may cause increase risk of myopathy please confirm that you are aware of this interaction and are ok with filling meds

## 2018-04-17 NOTE — TELEPHONE ENCOUNTER
Called [facility] and they had sent pt a refill of her rosuvastatin even though I had discontinued it at our last visit. I made sure they cancelled the Rx on their end, and then called the patient to make sure she has stopped taking it as well, due to her persistent statin myopathy.

## 2018-04-26 ENCOUNTER — HOSPITAL ENCOUNTER (OUTPATIENT)
Dept: LAB | Facility: MEDICAL CENTER | Age: 68
End: 2018-04-26
Attending: STUDENT IN AN ORGANIZED HEALTH CARE EDUCATION/TRAINING PROGRAM
Payer: MEDICARE

## 2018-04-26 ENCOUNTER — HOSPITAL ENCOUNTER (OUTPATIENT)
Dept: RADIOLOGY | Facility: MEDICAL CENTER | Age: 68
End: 2018-04-26
Attending: STUDENT IN AN ORGANIZED HEALTH CARE EDUCATION/TRAINING PROGRAM
Payer: MEDICARE

## 2018-04-26 DIAGNOSIS — G72.0 STATIN MYOPATHY: ICD-10-CM

## 2018-04-26 DIAGNOSIS — E78.5 DYSLIPIDEMIA: ICD-10-CM

## 2018-04-26 DIAGNOSIS — Z78.0 POST-MENOPAUSE: ICD-10-CM

## 2018-04-26 DIAGNOSIS — Z00.00 HEALTHCARE MAINTENANCE: ICD-10-CM

## 2018-04-26 DIAGNOSIS — T46.6X5A STATIN MYOPATHY: ICD-10-CM

## 2018-04-26 LAB
CHOLEST SERPL-MCNC: 248 MG/DL (ref 100–199)
CK SERPL-CCNC: 128 U/L (ref 0–154)
HDLC SERPL-MCNC: 45 MG/DL
LDLC SERPL CALC-MCNC: 146 MG/DL
TRIGL SERPL-MCNC: 286 MG/DL (ref 0–149)

## 2018-04-26 PROCEDURE — 77080 DXA BONE DENSITY AXIAL: CPT

## 2018-04-26 PROCEDURE — 80061 LIPID PANEL: CPT

## 2018-04-26 PROCEDURE — 82550 ASSAY OF CK (CPK): CPT

## 2018-04-26 PROCEDURE — 36415 COLL VENOUS BLD VENIPUNCTURE: CPT

## 2018-05-29 ENCOUNTER — APPOINTMENT (OUTPATIENT)
Dept: INTERNAL MEDICINE | Facility: MEDICAL CENTER | Age: 68
End: 2018-05-29
Payer: MEDICAID

## 2018-05-30 ENCOUNTER — OFFICE VISIT (OUTPATIENT)
Dept: INTERNAL MEDICINE | Facility: MEDICAL CENTER | Age: 68
End: 2018-05-30
Payer: MEDICARE

## 2018-05-30 VITALS
TEMPERATURE: 98 F | DIASTOLIC BLOOD PRESSURE: 74 MMHG | SYSTOLIC BLOOD PRESSURE: 124 MMHG | BODY MASS INDEX: 26.63 KG/M2 | HEIGHT: 64 IN | WEIGHT: 156 LBS | OXYGEN SATURATION: 94 % | HEART RATE: 77 BPM

## 2018-05-30 DIAGNOSIS — H11.431 CONJUNCTIVAL INJECTION, RIGHT: ICD-10-CM

## 2018-05-30 DIAGNOSIS — F41.9 ANXIETY: ICD-10-CM

## 2018-05-30 DIAGNOSIS — G44.209 TENSION HEADACHE: ICD-10-CM

## 2018-05-30 DIAGNOSIS — M79.10 MYALGIA DUE TO STATIN: ICD-10-CM

## 2018-05-30 DIAGNOSIS — R05.3 CHRONIC COUGH: ICD-10-CM

## 2018-05-30 DIAGNOSIS — E11.42 TYPE 2 DIABETES MELLITUS WITH DIABETIC POLYNEUROPATHY, WITHOUT LONG-TERM CURRENT USE OF INSULIN (HCC): ICD-10-CM

## 2018-05-30 DIAGNOSIS — R63.5 WEIGHT GAIN: ICD-10-CM

## 2018-05-30 DIAGNOSIS — M85.80 OSTEOPENIA, UNSPECIFIED LOCATION: ICD-10-CM

## 2018-05-30 DIAGNOSIS — E03.9 HYPOTHYROIDISM, UNSPECIFIED TYPE: ICD-10-CM

## 2018-05-30 DIAGNOSIS — T46.6X5A MYALGIA DUE TO STATIN: ICD-10-CM

## 2018-05-30 DIAGNOSIS — E78.5 DYSLIPIDEMIA: ICD-10-CM

## 2018-05-30 PROBLEM — G72.0 STATIN MYOPATHY: Status: RESOLVED | Noted: 2018-03-21 | Resolved: 2018-05-30

## 2018-05-30 LAB
HBA1C MFR BLD: 6.7 % (ref ?–5.8)
INT CON NEG: NEGATIVE
INT CON POS: POSITIVE

## 2018-05-30 PROCEDURE — 83036 HEMOGLOBIN GLYCOSYLATED A1C: CPT | Mod: GC | Performed by: INTERNAL MEDICINE

## 2018-05-30 PROCEDURE — 99213 OFFICE O/P EST LOW 20 MIN: CPT | Mod: GE | Performed by: INTERNAL MEDICINE

## 2018-05-30 RX ORDER — ACETAMINOPHEN 500 MG
500-1000 TABLET ORAL EVERY 6 HOURS PRN
Qty: 30 TAB | Refills: 0 | Status: SHIPPED | OUTPATIENT
Start: 2018-05-30 | End: 2019-04-08

## 2018-05-30 RX ORDER — SUMATRIPTAN 100 MG/1
100 TABLET, FILM COATED ORAL
Qty: 9 TAB | Refills: 3 | Status: SHIPPED | OUTPATIENT
Start: 2018-05-30 | End: 2022-01-21

## 2018-05-30 RX ORDER — FLUOXETINE 10 MG/1
10 CAPSULE ORAL DAILY
Qty: 30 CAP | Refills: 3 | Status: SHIPPED | OUTPATIENT
Start: 2018-05-30 | End: 2018-08-07 | Stop reason: SDUPTHER

## 2018-05-30 ASSESSMENT — ENCOUNTER SYMPTOMS
FEVER: 0
HEARTBURN: 1
PHOTOPHOBIA: 1
FLANK PAIN: 0
SINUS PAIN: 0
BLURRED VISION: 0
MYALGIAS: 0
EYE REDNESS: 1
HEADACHES: 1
NECK PAIN: 1
ROS GI COMMENTS: CHRONIC GERD
EYE PAIN: 0
EYE DISCHARGE: 0
DOUBLE VISION: 0
NERVOUS/ANXIOUS: 1
SHORTNESS OF BREATH: 0
CHILLS: 0
COUGH: 1

## 2018-05-30 NOTE — PROGRESS NOTES
Established Patient    Tonya presents today with the following:    CC: Ms. Estes is a 68F who presents for f/u of several chronic issues including chronic cough and headache, as well as her diabetes and hypothyroidism.    HPI:   # Hypothyroidism:  # T2DM  # Weight gain  -6 lb weight gain since last visit  -she reports no changes in her eating  -does report brittle hair, dry skin, heat intolerance  -last visit refilled synthroid at current dosage given her last TSH testing had normalized on it. Last testing 2/15/18  -POCT A1C today 6.7    # Dyslipidemia  # Myalgia with statin  -last visit, switched from statin to fenofibrate given she cannot tolerate statins d/t statin myalgia (and then switched to gemfibrozil as insurance would not cover fenofibrate); CK level was ordered since and is normal; repeat lipid panel also ordered and showed increase total cholesterol (212 to 248) but decreased triglycerides (469 to 286) and increased HDL (34 to 45). LDL now 146 (previously incalculable due to high triglycerides).     # Osteopenia  -ordered DEXA at last visit which was consistent with osteopenia. No bisphosphonates indicated at this time.  -recommended Shingrix and PPSV23 which she reports she received from her pharmacy    # Chronic cough  -last visit ordered methacholine challenge test d/t persistent suspicion for asthma with her chronic cough non-responsive to prilosec + ranitidine which she was never called about getting scheduled.  -continues to have chronic cough with 3-4 nighttime awakenings per week    # Headache  # Anxiety  -her major current complaint is headache, which she reports having 2-3 times per week  -denies associated n/v, photosensitivity, auditory sensitivity  -they are consistently felt in the back and front of her head  -she notices neck tightness with it  -worse with stress  -previously diagnosed with migraine but this may be more of a tension headache picture    # Conjunctival injection  "  -of the right eye for the last 2-3 weeks  -she felt pain in the eye with cold weather; now the pain has resolved but she continues to experience redness and tearing but no purulent exudates  -no sick contacts or those with eye infections  -no inciting trauma to the eye  -no vision changes, though she has some mild photosensitivity in that eye  -no fevers, chills, congestion, or sinus pain associated  -last appointment with ophthalmology was about 3 weeks ago; they gave her \"steroid drops\" and the symptoms resolved when she was using it but have since returned.           Patient Active Problem List    Diagnosis Date Noted   • Statin myopathy 03/21/2018   • Melena 07/21/2017   • Type 2 diabetes mellitus (HCC) 05/02/2017   • Essential hypertension 05/02/2017   • Migraine without status migrainosus, not intractable 05/02/2017   • Dyslipidemia 05/02/2017   • GERD (gastroesophageal reflux disease) 05/02/2017   • Primary insomnia 05/02/2017   • Hypothyroidism 05/02/2017   • Retinal detachment 02/12/2013       Current Outpatient Prescriptions   Medication Sig Dispense Refill   • raNITidine (ZANTAC) 150 MG Tab TAKE 1 TABLET TWICE DAILY 180 Tab 3   • gemfibrozil (LOPID) 600 MG Tab Take 1 Tab by mouth 2 times a day. 60 Tab 7   • omeprazole (PRILOSEC) 40 MG delayed-release capsule Take 1 Cap by mouth every day. 90 Cap 3   • levothyroxine (SYNTHROID) 25 MCG Tab TAKE 1 TABLET EVERY DAY 60 Tab 6   • metformin (GLUCOPHAGE) 1000 MG tablet TAKE 1/2 TABLET TWICE DAILY 90 Tab 3   • acetaminophen (TYLENOL) 500 MG Tab Take 500-1,000 mg by mouth every 6 hours as needed.     • propranolol (INDERAL) 40 MG Tab TAKE 1 TABLET TWICE DAILY 180 Tab 2   • gabapentin (NEURONTIN) 100 MG Cap Take 2 Caps by mouth 3 times a day. 120 Cap 3   • sumatriptan (IMITREX) 100 MG tablet Take 1 Tab by mouth Once PRN for Migraine for up to 1 dose. 20 Tab 3   • Blood Glucose Monitoring Suppl Device Meter: Freestyle glucometer. Sig. Use as directed for blood " sugar monitoring twice daily for insulin resistance. 1 Device 0   • Blood Glucose Monitoring Suppl SUPPLIES Misc Test strips order: Test strips for Abbott Freestyle Lite meter. Sig: use bid and prn ssx high or low sugar 200 Strip 11   • losartan-hydrochlorothiazide (HYZAAR) 50-12.5 MG per tablet Take 1 Tab by mouth every day. TAKE 1 TAB BY MOUTH EVERY DAY. 90 Tab 0   • Blood Glucose Monitoring Suppl SUPPLIES Misc Lancets order: Lancets  strips for One Touch Ultra 2 meter. Sig: check blood sugars twice daily (Patient taking differently: Lancets order for once a day testing for diabetes E11.9) 200 Each 2   • Blood Glucose Monitoring Suppl SUPPLIES Misc Lancets order: Lancets for Abbott Freestyle Lite meter. Sig: use bid and prn ssx high or low sugar 200 Each 11   • multivitamin (THERAGRAN) TABS Take 1 Tab by mouth every day.       • tobramycin-dexamethasone (TOBRADEX) 0.3-0.1 % Suspension PLACE 1 DROP INTO BOTH EYES 4 TIMES A DAY  4   • polyvinyl alcohol-povidone (REFRESH) 1.4-0.6 % ophthalmic solution Place 1 Drop in both eyes as needed. 1 Each 3   • Bismuth Subsalicylate (PEPTO-BISMOL PO) Take  by mouth.     • hydrocortisone 1 % Cream Please apply 5 times per day to the ulcer in your mouth for 3-5 days or until reduction of symptoms. 1 Tube 0   • prednisoLONE acetate (PRED FORTE) 1 % Suspension INSTILL 1 DROP INTO BOTH EYES 4 TIMES A DAY AS DIRECTED  1     No current facility-administered medications for this visit.        ROS: As per HPI. Additional pertinent symptoms as noted below.    Review of Systems   Constitutional: Negative for chills and fever.        Some heat intolerance, dry skin and hair   HENT: Negative for congestion and sinus pain.    Eyes: Positive for photophobia and redness. Negative for blurred vision, double vision, pain and discharge.   Respiratory: Positive for cough. Negative for shortness of breath.    Gastrointestinal: Positive for heartburn.        Chronic GERD   Genitourinary: Negative  "for flank pain.   Musculoskeletal: Positive for neck pain. Negative for myalgias.   Skin: Negative for itching and rash.   Neurological: Positive for headaches.   Psychiatric/Behavioral: The patient is nervous/anxious.      Otherwise negative except as noted in HPI.     /74   Pulse 77   Temp 36.7 °C (98 °F)   Ht 1.626 m (5' 4.02\")   Wt 70.8 kg (156 lb)   SpO2 94%   BMI 26.76 kg/m²     Physical Exam   Constitutional: She is oriented to person, place, and time. She appears well-developed and well-nourished. No distress.   HENT:   Head: Normocephalic and atraumatic.   Eyes: EOM are normal. Right eye exhibits no discharge. Left eye exhibits no discharge. No scleral icterus.   Very mild conjunctival injection R eye with also very mild periorbital swelling. No purulence. No pain with eye movements.    Cardiovascular: Normal rate, regular rhythm and normal heart sounds.  Exam reveals no gallop and no friction rub.    No murmur heard.  Pulmonary/Chest: Effort normal and breath sounds normal. No respiratory distress. She has no wheezes. She has no rales.   Abdominal: Soft. Bowel sounds are normal. She exhibits no distension. There is no tenderness. There is no rebound and no guarding.   Musculoskeletal: She exhibits no edema, tenderness or deformity.   Neurological: She is alert and oriented to person, place, and time.   Skin: Skin is warm and dry. No rash noted. She is not diaphoretic. No erythema. No pallor.   Psychiatric: She has a normal mood and affect. Her behavior is normal. Judgment and thought content normal.       Note: I have reviewed all pertinent labs and diagnostic tests associated with this visit with specific comments listed under the assessment and plan below    Assessment and Plan  Encounter Diagnoses   Name Primary?   • Type 2 diabetes mellitus with diabetic polyneuropathy, without long-term current use of insulin (HCC)    • Weight gain    • Hypothyroidism, unspecified type    • Tension " headache    • Chronic cough    • Conjunctival injection, right    • Anxiety    • Dyslipidemia    • Myalgia due to statin    • Osteopenia, unspecified location      # T2DM   # Hypothyroidism  # Weight gain  -POCT A1C 6.7, not high but certainly increased from last value of 6.0 in 02/2018  -TSH with reflex reordered   -dietary counseling    # Tension headache  # Anxiety  -9 tabs sumatriptan ordered  -acetaminophen ordered  -low dose fluoxetine ordered; will see again in 5 weeks to recheck level   -wish to avoid NSAIDs at this time given h/o GERD with pos testing for H pylori    # Chronic cough   -referral to pulmonology for methacholine challenge testing    # Conjunctival injection  -advised pt to f/u with ophthalmology    # DLD  # h/o statin-induced myalgias  -to continue on gemfibrozil for now and CTM  -she cannot tolerate statins    # Osteopenia  -per DEXA 10-year probability fx: any major 5.3%, hip .7%.  -CTM, no need for bisphosphonates at this time      Followup: Return in about 5 weeks (around 7/4/2018).      Signed by: Adriana Almaraz M.D.

## 2018-05-31 NOTE — PATIENT INSTRUCTIONS
Today we are referring you to pulmonology for a methacholine challenge test to see if you have asthma, we are ordering TSH for you to see if it is abnormal. We have ordered an A1C to check on your diabetes today and will follow up with you about the results. We recommend you speak to your ophthalmologist about your ongoing right eye problems. We have prescribed tylenol, fluoxetine, and imotrex for your headaches and anxiety. We recommend decreasing your portion sizes and your carbohydrate intake for help with your weight.

## 2018-08-06 ENCOUNTER — OFFICE VISIT (OUTPATIENT)
Dept: INTERNAL MEDICINE | Facility: MEDICAL CENTER | Age: 68
End: 2018-08-06
Payer: MEDICARE

## 2018-08-06 VITALS
SYSTOLIC BLOOD PRESSURE: 138 MMHG | TEMPERATURE: 98.3 F | DIASTOLIC BLOOD PRESSURE: 68 MMHG | WEIGHT: 149 LBS | HEIGHT: 64 IN | BODY MASS INDEX: 25.44 KG/M2 | HEART RATE: 65 BPM | OXYGEN SATURATION: 94 %

## 2018-08-06 DIAGNOSIS — J45.30 MILD PERSISTENT ASTHMA WITHOUT COMPLICATION: ICD-10-CM

## 2018-08-06 DIAGNOSIS — J30.2 SEASONAL ALLERGIC RHINITIS, UNSPECIFIED TRIGGER: ICD-10-CM

## 2018-08-06 DIAGNOSIS — M19.042 OA (OSTEOARTHRITIS) OF FINGER, LEFT: ICD-10-CM

## 2018-08-06 DIAGNOSIS — H10.89 OTHER CONJUNCTIVITIS OF RIGHT EYE: ICD-10-CM

## 2018-08-06 DIAGNOSIS — R05.3 CHRONIC COUGH: ICD-10-CM

## 2018-08-06 PROCEDURE — 99214 OFFICE O/P EST MOD 30 MIN: CPT | Mod: GC | Performed by: INTERNAL MEDICINE

## 2018-08-06 RX ORDER — ALBUTEROL SULFATE 90 UG/1
2 AEROSOL, METERED RESPIRATORY (INHALATION) EVERY 6 HOURS PRN
Qty: 8.5 G | Refills: 1 | Status: SHIPPED | OUTPATIENT
Start: 2018-08-06 | End: 2018-09-17 | Stop reason: SDUPTHER

## 2018-08-06 RX ORDER — BUDESONIDE AND FORMOTEROL FUMARATE DIHYDRATE 80; 4.5 UG/1; UG/1
2 AEROSOL RESPIRATORY (INHALATION) 2 TIMES DAILY
Qty: 1 INHALER | Refills: 1 | Status: SHIPPED | OUTPATIENT
Start: 2018-08-06 | End: 2018-09-19 | Stop reason: SDUPTHER

## 2018-08-06 RX ORDER — FLUTICASONE PROPIONATE 50 MCG
1 SPRAY, SUSPENSION (ML) NASAL DAILY
Qty: 16 G | Refills: 1 | Status: SHIPPED | OUTPATIENT
Start: 2018-08-06 | End: 2018-11-02 | Stop reason: SDUPTHER
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
## 2018-08-07 DIAGNOSIS — G44.209 TENSION HEADACHE: ICD-10-CM

## 2018-08-07 DIAGNOSIS — F41.9 ANXIETY: ICD-10-CM

## 2018-08-07 NOTE — TELEPHONE ENCOUNTER
Last seen: 08/06/18 by Dr. Rose  Next appt: 09/10/18 with Dr. Almaraz    Was the patient seen in the last year in this department? Yes   Does patient have an active prescription for medications requested? No   Received Request Via: Pharmacy

## 2018-08-08 RX ORDER — FLUOXETINE 10 MG/1
CAPSULE ORAL
Qty: 90 CAP | Refills: 3 | Status: SHIPPED | OUTPATIENT
Start: 2018-08-08 | End: 2019-04-08 | Stop reason: SINTOL

## 2018-08-27 ENCOUNTER — HOSPITAL ENCOUNTER (OUTPATIENT)
Dept: RADIOLOGY | Facility: MEDICAL CENTER | Age: 68
End: 2018-08-27
Attending: STUDENT IN AN ORGANIZED HEALTH CARE EDUCATION/TRAINING PROGRAM
Payer: MEDICARE

## 2018-08-27 DIAGNOSIS — Z12.31 VISIT FOR SCREENING MAMMOGRAM: ICD-10-CM

## 2018-08-27 PROCEDURE — 77067 SCR MAMMO BI INCL CAD: CPT

## 2018-09-10 ENCOUNTER — OFFICE VISIT (OUTPATIENT)
Dept: INTERNAL MEDICINE | Facility: MEDICAL CENTER | Age: 68
End: 2018-09-10
Payer: MEDICARE

## 2018-09-10 VITALS
OXYGEN SATURATION: 95 % | TEMPERATURE: 97.9 F | HEART RATE: 66 BPM | BODY MASS INDEX: 28.51 KG/M2 | HEIGHT: 61 IN | DIASTOLIC BLOOD PRESSURE: 70 MMHG | SYSTOLIC BLOOD PRESSURE: 122 MMHG | WEIGHT: 151 LBS

## 2018-09-10 DIAGNOSIS — M79.10 MYALGIA: ICD-10-CM

## 2018-09-10 DIAGNOSIS — Z23 NEED FOR VACCINATION: ICD-10-CM

## 2018-09-10 DIAGNOSIS — I10 ESSENTIAL HYPERTENSION: ICD-10-CM

## 2018-09-10 DIAGNOSIS — J45.30 MILD PERSISTENT ASTHMA, UNSPECIFIED WHETHER COMPLICATED: ICD-10-CM

## 2018-09-10 PROCEDURE — 90662 IIV NO PRSV INCREASED AG IM: CPT | Performed by: INTERNAL MEDICINE

## 2018-09-10 PROCEDURE — G0009 ADMIN PNEUMOCOCCAL VACCINE: HCPCS | Performed by: INTERNAL MEDICINE

## 2018-09-10 PROCEDURE — 90732 PPSV23 VACC 2 YRS+ SUBQ/IM: CPT | Performed by: INTERNAL MEDICINE

## 2018-09-10 PROCEDURE — G0008 ADMIN INFLUENZA VIRUS VAC: HCPCS | Performed by: INTERNAL MEDICINE

## 2018-09-10 PROCEDURE — 99213 OFFICE O/P EST LOW 20 MIN: CPT | Mod: GE,25 | Performed by: INTERNAL MEDICINE

## 2018-09-10 RX ORDER — LOSARTAN POTASSIUM AND HYDROCHLOROTHIAZIDE 12.5; 5 MG/1; MG/1
1 TABLET ORAL
Qty: 90 TAB | Refills: 3 | Status: SHIPPED | OUTPATIENT
Start: 2018-09-10 | End: 2019-04-08 | Stop reason: SDUPTHER

## 2018-09-10 ASSESSMENT — PAIN SCALES - GENERAL: PAINLEVEL: NO PAIN

## 2018-09-10 ASSESSMENT — ENCOUNTER SYMPTOMS
SHORTNESS OF BREATH: 0
FEVER: 0
DOUBLE VISION: 0
DIARRHEA: 0
COUGH: 0
ABDOMINAL PAIN: 0
NAUSEA: 0
WHEEZING: 0
BLURRED VISION: 1
PALPITATIONS: 0
VOMITING: 0
CHILLS: 0
MYALGIAS: 1

## 2018-09-10 ASSESSMENT — PATIENT HEALTH QUESTIONNAIRE - PHQ9: CLINICAL INTERPRETATION OF PHQ2 SCORE: 0

## 2018-09-10 NOTE — PATIENT INSTRUCTIONS
Ordered refill of your hypertension medication and 2 vaccines for you today. We will continue your asthma medications as they are working well for you. I will see you again in 15 weeks.

## 2018-09-10 NOTE — NON-PROVIDER
"Tonya Estes is a 68 y.o. female here for a non-provider visit for:   FLU  PNEUMOVAX (PPSV23)    Reason for immunization: Annual Flu Vaccine  Immunization records indicate need for vaccine: Yes, confirmed with Epic  Minimum interval has been met for this vaccine: Yes  ABN completed: Not Indicated    Order and dose verified by: [facility]  VIS Dated  Flu 8/7/15, PPSV 23 4/24/15 was given to patient: Yes  All IAC Questionnaire questions were answered \"No.\"    Patient tolerated injection and no adverse effects were observed or reported: Yes    Pt scheduled for next dose in series: Not Indicated    "

## 2018-09-17 DIAGNOSIS — R05.3 CHRONIC COUGH: ICD-10-CM

## 2018-09-17 DIAGNOSIS — J45.30 MILD PERSISTENT ASTHMA WITHOUT COMPLICATION: ICD-10-CM

## 2018-09-18 RX ORDER — ALBUTEROL SULFATE 90 UG/1
AEROSOL, METERED RESPIRATORY (INHALATION)
Qty: 2 INHALER | Refills: 6 | Status: SHIPPED | OUTPATIENT
Start: 2018-09-18 | End: 2022-01-21

## 2018-09-18 NOTE — TELEPHONE ENCOUNTER
Last seen: 09/10/18 by Dr. Almaraz  Next appt: 12/26/18 with Dr. Almaraz    Was the patient seen in the last year in this department? Yes   Does patient have an active prescription for medications requested? No   Received Request Via: Pharmacy

## 2018-10-18 ENCOUNTER — TELEPHONE (OUTPATIENT)
Dept: INTERNAL MEDICINE | Facility: MEDICAL CENTER | Age: 68
End: 2018-10-18

## 2018-12-24 DIAGNOSIS — K21.9 GASTROESOPHAGEAL REFLUX DISEASE, ESOPHAGITIS PRESENCE NOT SPECIFIED: ICD-10-CM

## 2018-12-24 RX ORDER — LEVOTHYROXINE SODIUM 0.03 MG/1
TABLET ORAL
Qty: 90 TAB | Refills: 6 | Status: SHIPPED | OUTPATIENT
Start: 2018-12-24 | End: 2022-01-21

## 2018-12-24 RX ORDER — OMEPRAZOLE 40 MG/1
CAPSULE, DELAYED RELEASE ORAL
Qty: 90 CAP | Refills: 3 | Status: SHIPPED | OUTPATIENT
Start: 2018-12-24 | End: 2019-04-08

## 2018-12-24 NOTE — TELEPHONE ENCOUNTER
Last seen: 09/10/18 by Dr. Almaraz  Next appt: 01/28/19 with Dr. Almaraz    Was the patient seen in the last year in this department? Yes   Does patient have an active prescription for medications requested? No   Received Request Via: Pharmacy

## 2018-12-24 NOTE — TELEPHONE ENCOUNTER
Refilling Mrs. Estes's synthroid and prilosec for now. Will continue to discuss risks/benefits of longterm PPI with her at f/u appts.

## 2018-12-26 ENCOUNTER — APPOINTMENT (OUTPATIENT)
Dept: INTERNAL MEDICINE | Facility: MEDICAL CENTER | Age: 68
End: 2018-12-26
Payer: MEDICAID

## 2018-12-28 ENCOUNTER — TELEPHONE (OUTPATIENT)
Dept: PULMONOLOGY | Facility: HOSPICE | Age: 68
End: 2018-12-28

## 2018-12-28 DIAGNOSIS — R06.02 SOB (SHORTNESS OF BREATH): Primary | ICD-10-CM

## 2019-01-03 ENCOUNTER — TELEPHONE (OUTPATIENT)
Dept: RADIOLOGY | Facility: IMAGING CENTER | Age: 69
End: 2019-01-03

## 2019-01-03 DIAGNOSIS — R93.89 ABNORMAL CHEST X-RAY: ICD-10-CM

## 2019-01-04 ENCOUNTER — NON-PROVIDER VISIT (OUTPATIENT)
Dept: PULMONOLOGY | Facility: HOSPICE | Age: 69
End: 2019-01-04
Attending: INTERNAL MEDICINE
Payer: MEDICARE

## 2019-01-04 ENCOUNTER — APPOINTMENT (OUTPATIENT)
Dept: RADIOLOGY | Facility: IMAGING CENTER | Age: 69
End: 2019-01-04
Payer: MEDICARE

## 2019-01-04 ENCOUNTER — OFFICE VISIT (OUTPATIENT)
Dept: PULMONOLOGY | Facility: HOSPICE | Age: 69
End: 2019-01-04
Payer: MEDICARE

## 2019-01-04 VITALS
HEART RATE: 71 BPM | SYSTOLIC BLOOD PRESSURE: 132 MMHG | HEIGHT: 60 IN | BODY MASS INDEX: 28.07 KG/M2 | TEMPERATURE: 97.9 F | WEIGHT: 143 LBS | DIASTOLIC BLOOD PRESSURE: 78 MMHG | RESPIRATION RATE: 16 BRPM | OXYGEN SATURATION: 93 %

## 2019-01-04 DIAGNOSIS — R93.89 ABNORMAL CHEST X-RAY: ICD-10-CM

## 2019-01-04 DIAGNOSIS — J45.30 MILD PERSISTENT ASTHMA WITHOUT COMPLICATION: ICD-10-CM

## 2019-01-04 DIAGNOSIS — R05.3 CHRONIC COUGH: ICD-10-CM

## 2019-01-04 DIAGNOSIS — R06.02 SOB (SHORTNESS OF BREATH): ICD-10-CM

## 2019-01-04 DIAGNOSIS — R49.0 HOARSENESS OF VOICE: ICD-10-CM

## 2019-01-04 DIAGNOSIS — K21.9 GASTROESOPHAGEAL REFLUX DISEASE, ESOPHAGITIS PRESENCE NOT SPECIFIED: ICD-10-CM

## 2019-01-04 PROCEDURE — 99204 OFFICE O/P NEW MOD 45 MIN: CPT | Mod: 25 | Performed by: INTERNAL MEDICINE

## 2019-01-04 PROCEDURE — 94010 BREATHING CAPACITY TEST: CPT | Performed by: INTERNAL MEDICINE

## 2019-01-04 ASSESSMENT — PULMONARY FUNCTION TESTS
FEV1/FVC_PERCENT_PREDICTED: 102
FEV1/FVC_PERCENT_LLN: 63
FEV1: 206
FEV1_PREDICTED: 1.88
FEV1/FVC: 77
FVC_LLN: 2.09
FEV1/FVC_PERCENT_PREDICTED: 75
FEV1/FVC_PERCENT_PREDICTED: 101
FVC_PERCENT_PREDICTED: 107
FEV1/FVC_PREDICTED: 76
FVC_PREDICTED: 2.5
FVC: 2.68
FEV1/FVC: 7687
FEV1_PERCENT_PREDICTED: 109
FEV1_LLN: 1.57

## 2019-01-04 NOTE — PROCEDURES
Technician: Tristen Garcia, RRT  Tech comments:  Good patient effort & cooperation.  The results of this test do not meet the ATS/ERS standards for acceptability and repeatability.  .    Interp: She had some difficulty completing the test with cough but was able to perform spirometry which was normal, mid flows are 95% predicted, FEV1 is 2.06 L, 109% predicted, and forced vital capacity was 107% predicted.  FEV1/FVC ratio was 101%.  Of note the patient's historical intake was recent respiratory illness, but on close questioning with an , this is a chronic process, no recent fever, and the difficulty breathing has actually been chronic without recent aggravation.  This allowed us to proceed with the spirometric measurements.    Also flow volume loop show truncation of the inspiratory limb, it appears that there may be some extrathoracic obstruction perhaps at the vocal cord level, see clinical history provided as well and today's progress note

## 2019-01-04 NOTE — PROGRESS NOTES
Tonya Estes is a 68 y.o. female here for cough with reactive airway disease and GERD and hoarseness. Patient was referred by her primary care doctor.    History of Present Illness:    This lady comes in at the request of her primary care doctor for evaluation of persistent cough, reactive airway disease and intermittent hoarseness.    This is been problematic for some time, she uses Symbicort and Ventolin when the cough becomes more prominent.  Hoarseness is intermittent.  She understands to rinse and gargle after using Symbicort.    She does have gastroesophageal reflux disease and I suspect that contributes to her cough and hoarseness.    She was asked to do lung function testing and possibly a methacholine challenge, but had some difficulty.  Baseline spirometry was normal, her response to inhalers does indicate reactive airways and I do not think we need to proceed with a methacholine challenge at this time.    I am more concerned because her flow volume loop shows abnormality of the inspiratory limb, truncation, and with cough and hoarseness she needs vocal cord inspection.  I made a referral to ear nose and throat for that purpose.    In the meantime she is current on flu vaccine, a non-smoker, has good body mass index, health maintenance addressed.    Her  is present, they have been  many years, he helps interpret and provides good insight.    Her cough is related to GERD, reactive airways, has a definite bronchodilator improvement, I think we should defer the methacholine challenge as her performance on the PFTs was difficult but most importantly the flow volume loop shows the inspiratory truncation and vocal cord inspection should be done.    I will see her back once this is accomplished and appreciate assisting in her care.  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      Constitutional ROS: No unexpected change in weight, No unexplained fevers  Eyes: No change in vision or blurring or double vision  Mouth/Throat ROS: No sore throat, No recent change in voice or hoarseness  Pulmonary ROS: See present history for pertinent positives  Cardiovascular ROS: No chest pain to suggest acute coronary syndrome  Gastrointestinal ROS: No abdominal pain to suggest peptic disease  Musculoskeletal/Extremities ROS: no acute artritis or unusual swelling  Hematologic/Lymphatic ROS: No easy bleeding or unusual lymph node swelling  Neurologic ROS: No new or unusual weakness  Psychiatric ROS: No hallucinations  Allergic/Immunologic: No  urticaria or allergic rash      Current Outpatient Prescriptions   Medication Sig Dispense Refill   • levothyroxine (SYNTHROID) 25 MCG Tab TAKE 1 TABLET EVERY DAY 90 Tab 6   • omeprazole (PRILOSEC) 40 MG delayed-release capsule TAKE 1 CAPSULE EVERY DAY 90 Cap 3   • fluticasone (FLONASE) 50 MCG/ACT nasal spray USE 1 SPRAY NASALLY EVERY DAY AS NEEDED FOR ITCHY, RUNNY NOSE. 16 g 6   • metFORMIN (GLUCOPHAGE) 1000 MG tablet Take 0.5 Tabs by mouth 2 times a day, with meals. 60 Tab 6   • gemfibrozil (LOPID) 600 MG Tab TAKE 1 TABLET TWICE DAILY 180 Tab 7   • SYMBICORT 80-4.5 MCG/ACT Aerosol INHALE 2 PUFFS TWICE DAILY 2 Inhaler 6   • VENTOLIN  (90 Base) MCG/ACT Aero Soln inhalation aerosol INHALE 2 PUFFS EVERY 6 HOURS AS NEEDED FOR SHORTNESS OF BREATH. 2 Inhaler 6   • losartan-hydrochlorothiazide (HYZAAR) 50-12.5 MG per tablet Take 1 Tab by mouth every day. 90 Tab 3   • FLUoxetine (PROZAC) 10 MG Cap TAKE 1 CAPSULE EVERY DAY 90 Cap 3   • acetaminophen (TYLENOL) 500 MG Tab Take 1-2 Tabs by mouth every 6 hours as needed. No more than 3 grams per day. 30 Tab 0   • sumatriptan (IMITREX) 100 MG tablet Take 1 Tab by mouth Once PRN for Migraine for up to 1 dose. 9 Tab 3   • raNITidine (ZANTAC) 150  MG Tab TAKE 1 TABLET TWICE DAILY 180 Tab 3   • tobramycin-dexamethasone (TOBRADEX) 0.3-0.1 % Suspension PLACE 1 DROP INTO BOTH EYES 4 TIMES A DAY  4   • polyvinyl alcohol-povidone (REFRESH) 1.4-0.6 % ophthalmic solution Place 1 Drop in both eyes as needed. 1 Each 3   • acetaminophen (TYLENOL) 500 MG Tab Take 500-1,000 mg by mouth every 6 hours as needed.     • propranolol (INDERAL) 40 MG Tab TAKE 1 TABLET TWICE DAILY 180 Tab 2   • gabapentin (NEURONTIN) 100 MG Cap Take 2 Caps by mouth 3 times a day. 120 Cap 3   • Bismuth Subsalicylate (PEPTO-BISMOL PO) Take  by mouth.     • hydrocortisone 1 % Cream Please apply 5 times per day to the ulcer in your mouth for 3-5 days or until reduction of symptoms. 1 Tube 0   • Blood Glucose Monitoring Suppl Device Meter: Freestyle glucometer. Sig. Use as directed for blood sugar monitoring twice daily for insulin resistance. 1 Device 0   • Blood Glucose Monitoring Suppl SUPPLIES Misc Test strips order: Test strips for Abbott Freestyle Lite meter. Sig: use bid and prn ssx high or low sugar 200 Strip 11   • Blood Glucose Monitoring Suppl SUPPLIES Misc Lancets order: Lancets  strips for One Touch Ultra 2 meter. Sig: check blood sugars twice daily (Patient taking differently: Lancets order for once a day testing for diabetes E11.9) 200 Each 2   • Blood Glucose Monitoring Suppl SUPPLIES Misc Lancets order: Lancets for Abbott Freestyle Lite meter. Sig: use bid and prn ssx high or low sugar 200 Each 11   • multivitamin (THERAGRAN) TABS Take 1 Tab by mouth every day.         No current facility-administered medications for this visit.        Social History   Substance Use Topics   • Smoking status: Never Smoker   • Smokeless tobacco: Never Used   • Alcohol use No        Past Medical History:   Diagnosis Date   • Asthma    • Diabetes    • Heart burn    • Hypertension    • Hypothyroidism    • Unspecified disorder of thyroid        Past Surgical History:   Procedure Laterality Date   • SCLERAL  FARIBA  2/12/2013    Performed by Shankar Estrada M.D. at SURGERY SAME DAY [facility] ORS   • CHOLECYSTECTOMY         Allergies: Patient has no known allergies.    Family History   Problem Relation Age of Onset   • Prostate cancer Father    • Heart Attack Mother    • Diabetes Sister        Physical Examination    Vitals:    01/04/19 0947   Height: 1.524 m (5')   Weight: 64.9 kg (143 lb)   Weight % change since last entry.: 0 %   BP: 132/78   Pulse: 71   BMI (Calculated): 27.93   Resp: 16   Temp: 36.6 °C (97.9 °F)   TempSrc: Oral   O2 sat % room air: 93 %       General Appearance: alert, no distress  Skin: Skin color, texture, turgor normal. No rashes or lesions.  Eyes: negative  Oropharynx: Lips, mucosa, and tongue normal. Teeth and gums normal. Oropharynx moist and without lesion  Lungs: positive findings: No wheezing inspiratory or expiratory, but some audible coarse sounds at the vocal cord level without marie stridor.  No associated adenopathy  Heart: negative. RRR without murmur, gallop, or rubs.  No ectopy.  Abdomen: Abdomen soft, non-tender. . No masses,  No organomegaly  Extremities:  No deformities, edema, or skin discoloration  Joints: No acute arthritis  Peripheral Pulses:perfused  Neurologic: intact grossly  No clubbing    I (soft palate, uvula, fauces, tonsillar pillars visible)    Imaging: None present prior x-ray was clear    PFTS: Normal baseline spirometry but abnormal flow volume loop with inspiratory limb truncation      Assessment and Plan  1. Chronic cough  - REFERRAL TO ENT    2. Gastroesophageal reflux disease, esophagitis presence not specified  - REFERRAL TO ENT    3. Hoarseness of voice  - REFERRAL TO ENT    4. Mild persistent asthma without complication    This lady comes in at the request of her primary care doctor for evaluation of persistent cough, reactive airway disease and intermittent hoarseness.    This is been problematic for some time, she uses Symbicort and Ventolin when  the cough becomes more prominent.  Hoarseness is intermittent.  She understands to rinse and gargle after using Symbicort.    She does have gastroesophageal reflux disease and I suspect that contributes to her cough and hoarseness.    She was asked to do lung function testing and possibly a methacholine challenge, but had some difficulty.  Baseline spirometry was normal, her response to inhalers does indicate reactive airways and I do not think we need to proceed with a methacholine challenge at this time.    I am more concerned because her flow volume loop shows abnormality of the inspiratory limb, truncation, and with cough and hoarseness she needs vocal cord inspection.  I made a referral to ear nose and throat for that purpose.    In the meantime she is current on flu vaccine, a non-smoker, has good body mass index, health maintenance addressed.    Her  is present, they have been  many years, he helps interpret and provides good insight.    Her cough is related to GERD, reactive airways, has a definite bronchodilator improvement, I think we should defer the methacholine challenge as her performance on the PFTs was difficult but most importantly the flow volume loop shows the inspiratory truncation and vocal cord inspection should be done.    I will see her back once this is accomplished and appreciate assisting in her care.  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  Followup Return in about 3 months (around 4/4/2019) for follow up visit with Dr. Luz Powers.

## 2019-01-04 NOTE — PATIENT INSTRUCTIONS
This lady comes in at the request of her primary care doctor for evaluation of persistent cough, reactive airway disease and intermittent hoarseness.    This is been problematic for some time, she uses Symbicort and Ventolin when the cough becomes more prominent.  Hoarseness is intermittent.  She understands to rinse and gargle after using Symbicort.    She does have gastroesophageal reflux disease and I suspect that contributes to her cough and hoarseness.    She was asked to do lung function testing and possibly a methacholine challenge, but had some difficulty.  Baseline spirometry was normal, her response to inhalers does indicate reactive airways and I do not think we need to proceed with a methacholine challenge at this time.    I am more concerned because her flow volume loop shows abnormality of the inspiratory limb, truncation, and with cough and hoarseness she needs vocal cord inspection.  I made a referral to ear nose and throat for that purpose.    In the meantime she is current on flu vaccine, a non-smoker, has good body mass index, health maintenance addressed.    Her  is present, they have been  many years, he helps interpret and provides good insight.    Her cough is related to GERD, reactive airways, has a definite bronchodilator improvement, I think we should defer the methacholine challenge as her performance on the PFTs was difficult but most importantly the flow volume loop shows the inspiratory truncation and vocal cord inspection should be done.    I will see her back once this is accomplished and appreciate assisting in her care.  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

## 2019-01-28 ENCOUNTER — OFFICE VISIT (OUTPATIENT)
Dept: INTERNAL MEDICINE | Facility: MEDICAL CENTER | Age: 69
End: 2019-01-28
Payer: MEDICARE

## 2019-01-28 VITALS
OXYGEN SATURATION: 94 % | WEIGHT: 150.2 LBS | HEIGHT: 60 IN | HEART RATE: 73 BPM | TEMPERATURE: 97.6 F | DIASTOLIC BLOOD PRESSURE: 74 MMHG | SYSTOLIC BLOOD PRESSURE: 138 MMHG | BODY MASS INDEX: 29.49 KG/M2

## 2019-01-28 DIAGNOSIS — H33.21 RIGHT RETINAL DETACHMENT: ICD-10-CM

## 2019-01-28 DIAGNOSIS — E11.42 TYPE 2 DIABETES MELLITUS WITH DIABETIC POLYNEUROPATHY, WITHOUT LONG-TERM CURRENT USE OF INSULIN (HCC): ICD-10-CM

## 2019-01-28 DIAGNOSIS — G43.109 MIGRAINE WITH AURA AND WITHOUT STATUS MIGRAINOSUS, NOT INTRACTABLE: ICD-10-CM

## 2019-01-28 DIAGNOSIS — Z23 NEED FOR VACCINATION: ICD-10-CM

## 2019-01-28 DIAGNOSIS — K21.9 GASTROESOPHAGEAL REFLUX DISEASE, ESOPHAGITIS PRESENCE NOT SPECIFIED: ICD-10-CM

## 2019-01-28 DIAGNOSIS — G62.89 OTHER POLYNEUROPATHY: ICD-10-CM

## 2019-01-28 DIAGNOSIS — J45.30 MILD PERSISTENT ASTHMA WITHOUT COMPLICATION: ICD-10-CM

## 2019-01-28 DIAGNOSIS — R22.2 SUPRACLAVICULAR FOSSA FULLNESS: ICD-10-CM

## 2019-01-28 DIAGNOSIS — T39.95XA ANALGESIC REBOUND HEADACHE: ICD-10-CM

## 2019-01-28 DIAGNOSIS — G44.40 ANALGESIC REBOUND HEADACHE: ICD-10-CM

## 2019-01-28 DIAGNOSIS — E07.89 THYROID FULLNESS: ICD-10-CM

## 2019-01-28 DIAGNOSIS — E03.9 HYPOTHYROIDISM, UNSPECIFIED TYPE: ICD-10-CM

## 2019-01-28 DIAGNOSIS — E78.5 DYSLIPIDEMIA: ICD-10-CM

## 2019-01-28 LAB
HBA1C MFR BLD: 6.4 % (ref ?–5.8)
INT CON NEG: NORMAL
INT CON POS: NORMAL

## 2019-01-28 PROCEDURE — 83036 HEMOGLOBIN GLYCOSYLATED A1C: CPT | Mod: GC | Performed by: INTERNAL MEDICINE

## 2019-01-28 PROCEDURE — 99214 OFFICE O/P EST MOD 30 MIN: CPT | Mod: GC | Performed by: INTERNAL MEDICINE

## 2019-01-28 RX ORDER — TOBRAMYCIN AND DEXAMETHASONE 3; 1 MG/ML; MG/ML
1 SUSPENSION/ DROPS OPHTHALMIC 4 TIMES DAILY
Qty: 10 ML | Refills: 4 | Status: SHIPPED | OUTPATIENT
Start: 2019-01-28 | End: 2022-01-21

## 2019-01-28 ASSESSMENT — ENCOUNTER SYMPTOMS
VOMITING: 0
FEVER: 0
CHILLS: 0
DIARRHEA: 0
SHORTNESS OF BREATH: 0
SENSORY CHANGE: 1
ABDOMINAL PAIN: 1
PALPITATIONS: 0
COUGH: 1
NAUSEA: 0
DOUBLE VISION: 0
WHEEZING: 0
MYALGIAS: 1
HEADACHES: 1
BLURRED VISION: 1

## 2019-01-28 ASSESSMENT — PAIN SCALES - GENERAL: PAINLEVEL: 8=MODERATE-SEVERE PAIN

## 2019-01-28 ASSESSMENT — PATIENT HEALTH QUESTIONNAIRE - PHQ9: CLINICAL INTERPRETATION OF PHQ2 SCORE: 0

## 2019-01-28 NOTE — PROGRESS NOTES
Established Patient    Tonya presents today with the following:    CC: Ms. Estes is a 68F who presents today for f/u of several chronic medical problems including T2DM, peripheral neuropathy, migraine, DLD, and GERD.    HPI:     # Retinal detachment:   -previously was seen by ophthalmology but last ophthalmologist retired  -has set up new appt with new ophthalmologist but needs torbamycin-dexamethasone ophthalmic suspension reordered until then    # GERD  -on ranitidine and high dose omeprazole. Previously when we tried to wean off omeprazole, she reported the ranitidine alone did not control sx.   -she saw GI for her sx in 2017 and was positive for H. Pylori and underwent abx treatment. However, at f/u appt with GI she did not realize she was supposed to get confirmatory testing. We do not have updated records from them after this point (10/2017), so unclear if she ever did get confirmatory testing for eradication  - reports they DID confirm eradication     # DLD   -last lipid panel 04/2018 showed cholesterol 248, triglycerides 286, HDL 45,   -currently on gemfibrozil as she could not tolerate statins (d/t statin-induced myalgias) despite dosing changes and medication vacations    # DM with peripheral neuropathy  -last microalbumin/Cr ratio wnl (5.2) on 10/2017  -last A1C 07/2017 was 6.7  -currently on metformin and gabapentin for related neuropathy  -due today for repeat A1C, microalbumin/Cr ratio, and referral to ophthalmology for retinal screen    # Mild persistent asthma  -on albuterol   -was referred to pulmonology who were concerened that FVL showed possible extrinsic compression and referred to ENT to get vocal cords assessed; however this appt had to be deferred as she had URI sx at the time. She has another appointment scheduled  -chronic cough improved and wheezing resolved on albuterol  -per Pulm, does not need methacholine challenge test    # Hypothyroidism  -on synthroid, stable  "dosing  -last TSH 02/2018 was wnl    # Chronic migraine  -currently on propranolol and triptan; reports the sumatriptan helps \"a lot\". Uses 4-5 tabs of sumatriptan per month + tylenol 10 days per month.  -she feels it as a frontal numbness and pain which is 8/10, daily, dull, and associated with seeing floaters in her vision. She denies associated nausea or vomiting.    Preventive care  Flu - utd  TD- due for TDaP  TDaP - ordering for today  Pneumococcal - utd  Prevnar - utd, given 2016  Colonoscopy - utd, 10/2009, was normal  Shingrix-needs shingrix; however, has gotten zostavax    Diabetes Retinal Screen (Date and result)-needed    Women only  Pap - >65   Mammogram - utd (08/2018)  Dexa - utd (04/2018, showed osteopenia)          Patient Active Problem List    Diagnosis Date Noted   • Hoarseness of voice 01/04/2019   • Mild persistent asthma without complication 01/04/2019   • Chronic cough 08/06/2018   • Myalgia due to statin 05/30/2018   • Osteopenia 05/30/2018   • Melena 07/21/2017   • Type 2 diabetes mellitus (HCC) 05/02/2017   • Essential hypertension 05/02/2017   • Tension headache 05/02/2017   • Dyslipidemia 05/02/2017   • GERD (gastroesophageal reflux disease) 05/02/2017   • Primary insomnia 05/02/2017   • Hypothyroidism 05/02/2017   • Retinal detachment 02/12/2013       Current Outpatient Prescriptions   Medication Sig Dispense Refill   • levothyroxine (SYNTHROID) 25 MCG Tab TAKE 1 TABLET EVERY DAY 90 Tab 6   • omeprazole (PRILOSEC) 40 MG delayed-release capsule TAKE 1 CAPSULE EVERY DAY 90 Cap 3   • fluticasone (FLONASE) 50 MCG/ACT nasal spray USE 1 SPRAY NASALLY EVERY DAY AS NEEDED FOR ITCHY, RUNNY NOSE. 16 g 6   • metFORMIN (GLUCOPHAGE) 1000 MG tablet Take 0.5 Tabs by mouth 2 times a day, with meals. 60 Tab 6   • gemfibrozil (LOPID) 600 MG Tab TAKE 1 TABLET TWICE DAILY 180 Tab 7   • SYMBICORT 80-4.5 MCG/ACT Aerosol INHALE 2 PUFFS TWICE DAILY 2 Inhaler 6   • VENTOLIN  (90 Base) MCG/ACT Aero Soln " inhalation aerosol INHALE 2 PUFFS EVERY 6 HOURS AS NEEDED FOR SHORTNESS OF BREATH. 2 Inhaler 6   • losartan-hydrochlorothiazide (HYZAAR) 50-12.5 MG per tablet Take 1 Tab by mouth every day. 90 Tab 3   • FLUoxetine (PROZAC) 10 MG Cap TAKE 1 CAPSULE EVERY DAY 90 Cap 3   • acetaminophen (TYLENOL) 500 MG Tab Take 1-2 Tabs by mouth every 6 hours as needed. No more than 3 grams per day. 30 Tab 0   • sumatriptan (IMITREX) 100 MG tablet Take 1 Tab by mouth Once PRN for Migraine for up to 1 dose. 9 Tab 3   • raNITidine (ZANTAC) 150 MG Tab TAKE 1 TABLET TWICE DAILY 180 Tab 3   • tobramycin-dexamethasone (TOBRADEX) 0.3-0.1 % Suspension PLACE 1 DROP INTO BOTH EYES 4 TIMES A DAY  4   • polyvinyl alcohol-povidone (REFRESH) 1.4-0.6 % ophthalmic solution Place 1 Drop in both eyes as needed. 1 Each 3   • acetaminophen (TYLENOL) 500 MG Tab Take 500-1,000 mg by mouth every 6 hours as needed.     • propranolol (INDERAL) 40 MG Tab TAKE 1 TABLET TWICE DAILY 180 Tab 2   • gabapentin (NEURONTIN) 100 MG Cap Take 2 Caps by mouth 3 times a day. 120 Cap 3   • Bismuth Subsalicylate (PEPTO-BISMOL PO) Take  by mouth.     • hydrocortisone 1 % Cream Please apply 5 times per day to the ulcer in your mouth for 3-5 days or until reduction of symptoms. 1 Tube 0   • Blood Glucose Monitoring Suppl Device Meter: Freestyle glucometer. Sig. Use as directed for blood sugar monitoring twice daily for insulin resistance. 1 Device 0   • Blood Glucose Monitoring Suppl SUPPLIES Misc Test strips order: Test strips for Abbott Freestyle Lite meter. Sig: use bid and prn ssx high or low sugar 200 Strip 11   • Blood Glucose Monitoring Suppl SUPPLIES Misc Lancets order: Lancets  strips for One Touch Ultra 2 meter. Sig: check blood sugars twice daily (Patient taking differently: Lancets order for once a day testing for diabetes E11.9) 200 Each 2   • Blood Glucose Monitoring Suppl SUPPLIES Misc Lancets order: Lancets for Abbott Freestyle Lite meter. Sig: use bid and prn  ssx high or low sugar 200 Each 11   • multivitamin (THERAGRAN) TABS Take 1 Tab by mouth every day.         No current facility-administered medications for this visit.        Social History     Social History   • Marital status:      Spouse name: N/A   • Number of children: N/A   • Years of education: N/A     Occupational History   • Not on file.     Social History Main Topics   • Smoking status: Never Smoker   • Smokeless tobacco: Never Used   • Alcohol use No   • Drug use: No   • Sexual activity: Not on file     Other Topics Concern   • Not on file     Social History Narrative   • No narrative on file       Family History   Problem Relation Age of Onset   • Prostate cancer Father    • Heart Attack Mother    • Diabetes Sister        ROS: As per HPI. Additional pertinent systems as noted below.    Review of Systems   Constitutional: Negative for chills and fever.   HENT:        +hoarseness   Eyes: Positive for blurred vision. Negative for double vision.        +floaters with migraine   Respiratory: Positive for cough. Negative for shortness of breath and wheezing.    Cardiovascular: Negative for chest pain and palpitations.   Gastrointestinal: Positive for abdominal pain. Negative for diarrhea, nausea and vomiting.        Intermittent epigastric pain and tenderness   Genitourinary: Negative for dysuria and hematuria.   Musculoskeletal: Positive for myalgias. Negative for joint pain.        Left neck swelling. Some left forearm and LLE aching/cramping pain.   Skin: Positive for itching. Negative for rash.        Dry skin with itchiness   Neurological: Positive for sensory change and headaches.        Frontal headaches and numbness         /74 (BP Location: Right arm, Patient Position: Sitting)   Pulse 73   Temp 36.4 °C (97.6 °F) (Temporal)   Ht 1.524 m (5')   Wt 68.1 kg (150 lb 3.2 oz)   SpO2 94%   BMI 29.33 kg/m²     Physical Exam   Constitutional: She is oriented to person, place, and time. She  appears well-developed and well-nourished. No distress.   HENT:   Head: Normocephalic and atraumatic.   Eyes: Conjunctivae are normal. Right eye exhibits no discharge. Left eye exhibits no discharge. No scleral icterus.   Neck: Thyromegaly present.   There is significant supraclavicular fossa swelling on the left. Additionally there is left-sided thyroid fullness and nodularity on exam.   Cardiovascular: Normal rate, regular rhythm and normal heart sounds.  Exam reveals no gallop and no friction rub.    No murmur heard.  Pulmonary/Chest: Effort normal and breath sounds normal. No stridor. No respiratory distress. She has no wheezes. She has no rales.   Abdominal: Soft. Bowel sounds are normal. She exhibits no distension. There is no tenderness. There is no rebound and no guarding.   Musculoskeletal: She exhibits no edema, tenderness or deformity.   Neurological: She is alert and oriented to person, place, and time.   Skin: Skin is warm and dry. No rash noted. She is not diaphoretic. No erythema. No pallor.   Psychiatric: She has a normal mood and affect. Her behavior is normal. Judgment and thought content normal.       Note: I have reviewed all pertinent labs and diagnostic tests associated with this visit with specific comments listed under the assessment and plan below      Assessment and Plan  Encounter Diagnoses   Name Primary?   • Type 2 diabetes mellitus with diabetic polyneuropathy, without long-term current use of insulin (HCC)    • Right retinal detachment    • Hypothyroidism, unspecified type    • Gastroesophageal reflux disease, esophagitis presence not specified    • Mild persistent asthma without complication    • Dyslipidemia    • Migraine with aura and without status migrainosus, not intractable    • Analgesic rebound headache    • Need for vaccination    • Thyroid fullness    • Supraclavicular fossa fullness    • Other polyneuropathy      # T2DM  # Peripheral neuropathy  -POCT A1C performed today.  Was 6.4.  -no need for daily BG monitoring given good control.  -will continue metformin   -microalb/Cr ratio and B12 ordered.   -asked her to request diabetic retinal screen at next ophthalmology appt which is currently scheduled    # Right retinal detachment  -has scheduled upcoming appt  -refilled tobramycin/dexamethasone ophthalmic solution just to last her to appt    # GERD  -counseled on risks of longterm PPI use  -given H pylori eradication, advised stopping omeprazole and continuing ranitidine to see how her symptoms can be controlled just on the ranitidine. Will reassess at next appt in 5 weeks    # Mild persistent asthma  # Thyroid fullness  # Supraclavicular fossa fullness  -US soft tissues of neck ordered  -also strongly advised she f/u with scheduled ENT appt for possible laryngoscopy given pulmonology concerns about FVL    # DLD  -repeat lipid panel ordered    # Migraine with aura (floaters), non-intractable  # Analgesic rebound HA  -propranolol and sumatriptan do help; however, this increased frequency is likely d/t rebound.   -advised using <10 day/month analgesics to see if this will reduce frequency of HA    # Need for vaccination  -advised she get Tdap and shingrix at local pharmacy      Followup: Return in about 5 weeks (around 3/4/2019) for Long.      Signed by: Adriana Almaraz M.D.

## 2019-01-28 NOTE — PATIENT INSTRUCTIONS
Please stop taking the omeprazole and just take the ranitidine for now. We can reassess in 5 weeks to see how your acid reflux symptoms are doing with just the ranitidine (as there are longterm side effects of taking omeprazole chronically that I am trying to avoid). Please see ENT as you were referred by Pulmonary doctor; I am also ordering an ultrasound of her thyroid and neck. Please try to reduce your use of tylenol to less than half the days per month. I will refill your eye drops for now but I want ophthalmology to take over so please see them; Also, when you see the eye doctor, please have them also do a diabetic retinal screen. Please get Tdap and shingrix vaccinations at your local pharmacy. I will check your thyroid levels which may be causing your dry itchy skin; in the meantime before your next appointment try lukewarm baths or showers and moisturizing with a heavy cream (for instance, one that says it is for eczema or has colloidal oatmeal in it) afterwards.

## 2019-01-31 ENCOUNTER — HOSPITAL ENCOUNTER (OUTPATIENT)
Dept: RADIOLOGY | Facility: MEDICAL CENTER | Age: 69
End: 2019-01-31
Attending: STUDENT IN AN ORGANIZED HEALTH CARE EDUCATION/TRAINING PROGRAM
Payer: MEDICARE

## 2019-01-31 DIAGNOSIS — E07.89 THYROID FULLNESS: ICD-10-CM

## 2019-01-31 DIAGNOSIS — R22.2 SUPRACLAVICULAR FOSSA FULLNESS: ICD-10-CM

## 2019-01-31 PROCEDURE — 76536 US EXAM OF HEAD AND NECK: CPT

## 2019-03-02 LAB
ALBUMIN/CREAT UR: 5.2 MG/G CREAT (ref 0–30)
CHOLEST SERPL-MCNC: 219 MG/DL (ref 100–199)
CREAT UR-MCNC: 91.2 MG/DL
HDLC SERPL-MCNC: 39 MG/DL
LABORATORY COMMENT REPORT: ABNORMAL
LDLC SERPL CALC-MCNC: 125 MG/DL (ref 0–99)
MICROALBUMIN UR-MCNC: 4.7 UG/ML
TRIGL SERPL-MCNC: 277 MG/DL (ref 0–149)
TSH SERPL DL<=0.005 MIU/L-ACNC: 1.41 UIU/ML (ref 0.45–4.5)
VIT B12 SERPL-MCNC: 1108 PG/ML (ref 232–1245)
VLDLC SERPL CALC-MCNC: 55 MG/DL (ref 5–40)

## 2019-03-04 ENCOUNTER — OFFICE VISIT (OUTPATIENT)
Dept: INTERNAL MEDICINE | Facility: MEDICAL CENTER | Age: 69
End: 2019-03-04
Payer: MEDICARE

## 2019-03-04 VITALS
HEART RATE: 71 BPM | BODY MASS INDEX: 29.49 KG/M2 | WEIGHT: 150.2 LBS | DIASTOLIC BLOOD PRESSURE: 70 MMHG | OXYGEN SATURATION: 91 % | SYSTOLIC BLOOD PRESSURE: 130 MMHG | TEMPERATURE: 97.5 F | HEIGHT: 60 IN

## 2019-03-04 DIAGNOSIS — R22.30 SHOULDER MASS: ICD-10-CM

## 2019-03-04 DIAGNOSIS — R94.2 ABNORMAL FLOW VOLUME LOOP: ICD-10-CM

## 2019-03-04 DIAGNOSIS — M79.10 MYALGIA: ICD-10-CM

## 2019-03-04 DIAGNOSIS — T39.95XA ANALGESIC REBOUND HEADACHE: ICD-10-CM

## 2019-03-04 DIAGNOSIS — J34.89 LESION OF NOSE: ICD-10-CM

## 2019-03-04 DIAGNOSIS — E04.9 ENLARGED THYROID: ICD-10-CM

## 2019-03-04 DIAGNOSIS — E04.2 MULTIPLE THYROID NODULES: ICD-10-CM

## 2019-03-04 DIAGNOSIS — K21.9 GASTROESOPHAGEAL REFLUX DISEASE WITHOUT ESOPHAGITIS: ICD-10-CM

## 2019-03-04 DIAGNOSIS — G62.9 POLYNEUROPATHY: ICD-10-CM

## 2019-03-04 DIAGNOSIS — E11.9 TYPE 2 DIABETES MELLITUS WITHOUT COMPLICATION, WITHOUT LONG-TERM CURRENT USE OF INSULIN (HCC): ICD-10-CM

## 2019-03-04 DIAGNOSIS — G44.40 ANALGESIC REBOUND HEADACHE: ICD-10-CM

## 2019-03-04 PROCEDURE — 99214 OFFICE O/P EST MOD 30 MIN: CPT | Mod: GC | Performed by: INTERNAL MEDICINE

## 2019-03-04 NOTE — PATIENT INSTRUCTIONS
I am ordering some labs for you and referring you to dermatology (Dr. Frazier) to look at your nose lesion, as well as for EMG to test your neuropathy, and to interventional radiology to get a biopsy of your thyroid nodules. The radiology appointment is very important so if you do not hear back in the next few days please call [phone] (the [facility] scheduling number) and let the schedulers know you are calling to get your urgent appointment with IR for a thyroid nodule biopsy. I am also ordering an X-ray of your right shoulder. I will see you back in 5 weeks.

## 2019-03-04 NOTE — PROGRESS NOTES
"Established Patient    Tonya presents today with the following:    CC: Mrs. Estes is here today to discuss f/u of multiple issues including her peripheral neuropathy and thyroid nodules    HPI:    # T2DM  # Peripheral neuropathy  # DLD  -last visit, POCT A1C was 6.4; let her know there was no need for daily BG monitoring given good control.  -currently on metformin monotherapy  -microalb/Cr ratio which was normal, and B12 was normal  -lipid panel showed total chol 219, , , elevated VLDL  -asked her to request diabetic retinal screen at next ophthalmology appt which is currently scheduled; she reports that appt is coming up  -she still has neuropathy sx.      # GERD  -at last visit, counseled on risks of longterm PPI use  -given her prior H pylori eradication, advised stopping omeprazole and continuing ranitidine to see how her symptoms could be controlled just on the ranitidine.   -today she reports she is not taking any medications for GERD at this time and is not having any sx.     # Hypothyroidism  # Multiple thyroid nodules  -US soft tissues of neck ordered at last visit. Per radiology read:    \"The right lobe of the thyroid gland measures 1.82 cm x 4.70 cm x 1.62 cm. There is an ill-defined nodular area in the inferior right lobe of the thyroid measuring 2.2 x 0.88 x 1.28 cm (2.2 x 1.2 x 1.53 cm previously). There is a hypoechoic nodule in the inferior posterior right lobe of the thyroid measuring 1.18 x 1.07 x 0.74 cm (0.94 x 0.85 x 0.83 cm previously).   The left lobe of the thyroid gland measures 2.19 cm x 5.81 cm x 2.41 cm. There is a large heterogeneous dominant nodule within the left lobe of the thyroid containing calcifications which measures 3.32 x 2.06 x 2.36 cm (previously 3.13 x 2.08 x 2.07 cm).   Internal vascularity is seen. A nodule along the inferior left lobe of the thyroid measures 1.28 x 1.15 x 1.51 cm (1.68 x 1.2 x 1.3 cm previously).\"  -on further chart review it appears she " "already had a prior thyroid US in 01/2016 before she established with me for which the read was \"Thyroid enlargement with multiple bilateral thyroid nodules. Consider FNA of the largest thyroid nodules.\"   -she reports she never got a FNA biopsy of thyroid nodule in 2016    # Right shoulder mass/swelling  -she has new hard, poorly mobile mass present superior to the right AC joint  -reports no trauma to the shoulder and ROM is not limited; she attributes to \"sleeping on it wrong\"  -mass is mildly tender    # abnormal flow volume loop on PFTs  -at last visit, also strongly advised she f/u with scheduled ENT appt for possible laryngoscopy given pulmonology concerns about flow volume loop demonstrated possible vocal cord problem.   -She reports she did see ENT who performed a laryngoscopy and found no problem but suggested she may have GERD  -possibly d/t extrinsic compression related to enlarged thyroid/thyroid nodules?     # Migraine with aura (floaters), non-intractable  # Analgesic rebound HA  -last visit, she reported that propranolol and sumatriptan do help; however, I felt her increased frequency was likely d/t rebound and advised using <10 day/month analgesics to see if this will reduce frequency of HA  -today she reports she is still getting daily headaches; however, she is still taking medications every day    # Nose skin lesion  -new; she reports some pain/mild tenderness         Patient Active Problem List    Diagnosis Date Noted   • Analgesic rebound headache 01/28/2019   • Hoarseness of voice 01/04/2019   • Mild persistent asthma without complication 01/04/2019   • Chronic cough 08/06/2018   • Myalgia due to statin 05/30/2018   • Osteopenia 05/30/2018   • Melena 07/21/2017   • Type 2 diabetes mellitus (HCC) 05/02/2017   • Essential hypertension 05/02/2017   • Migraine with aura 05/02/2017   • Dyslipidemia 05/02/2017   • GERD (gastroesophageal reflux disease) 05/02/2017   • Primary insomnia 05/02/2017   • " Hypothyroidism 05/02/2017   • Retinal detachment 02/12/2013       Current Outpatient Prescriptions   Medication Sig Dispense Refill   • raNITidine (ZANTAC) 150 MG Tab TAKE 1 TABLET TWICE DAILY 180 Tab 3   • tobramycin-dexamethasone (TOBRADEX) 0.3-0.1 % Suspension Place 1 Drop in both eyes 4 times a day. 10 mL 4   • levothyroxine (SYNTHROID) 25 MCG Tab TAKE 1 TABLET EVERY DAY 90 Tab 6   • omeprazole (PRILOSEC) 40 MG delayed-release capsule TAKE 1 CAPSULE EVERY DAY 90 Cap 3   • fluticasone (FLONASE) 50 MCG/ACT nasal spray USE 1 SPRAY NASALLY EVERY DAY AS NEEDED FOR ITCHY, RUNNY NOSE. 16 g 6   • metFORMIN (GLUCOPHAGE) 1000 MG tablet Take 0.5 Tabs by mouth 2 times a day, with meals. 60 Tab 6   • gemfibrozil (LOPID) 600 MG Tab TAKE 1 TABLET TWICE DAILY 180 Tab 7   • SYMBICORT 80-4.5 MCG/ACT Aerosol INHALE 2 PUFFS TWICE DAILY 2 Inhaler 6   • VENTOLIN  (90 Base) MCG/ACT Aero Soln inhalation aerosol INHALE 2 PUFFS EVERY 6 HOURS AS NEEDED FOR SHORTNESS OF BREATH. 2 Inhaler 6   • losartan-hydrochlorothiazide (HYZAAR) 50-12.5 MG per tablet Take 1 Tab by mouth every day. 90 Tab 3   • FLUoxetine (PROZAC) 10 MG Cap TAKE 1 CAPSULE EVERY DAY 90 Cap 3   • acetaminophen (TYLENOL) 500 MG Tab Take 1-2 Tabs by mouth every 6 hours as needed. No more than 3 grams per day. 30 Tab 0   • sumatriptan (IMITREX) 100 MG tablet Take 1 Tab by mouth Once PRN for Migraine for up to 1 dose. 9 Tab 3   • polyvinyl alcohol-povidone (REFRESH) 1.4-0.6 % ophthalmic solution Place 1 Drop in both eyes as needed. (Patient not taking: Reported on 1/28/2019) 1 Each 3   • acetaminophen (TYLENOL) 500 MG Tab Take 500-1,000 mg by mouth every 6 hours as needed.     • propranolol (INDERAL) 40 MG Tab TAKE 1 TABLET TWICE DAILY 180 Tab 2   • gabapentin (NEURONTIN) 100 MG Cap Take 2 Caps by mouth 3 times a day. 120 Cap 3   • Bismuth Subsalicylate (PEPTO-BISMOL PO) Take  by mouth.     • multivitamin (THERAGRAN) TABS Take 1 Tab by mouth every day.         No current  facility-administered medications for this visit.        Social History     Social History   • Marital status:      Spouse name: N/A   • Number of children: N/A   • Years of education: N/A     Occupational History   • Not on file.     Social History Main Topics   • Smoking status: Never Smoker   • Smokeless tobacco: Never Used   • Alcohol use No   • Drug use: No   • Sexual activity: Not on file     Other Topics Concern   • Not on file     Social History Narrative   • No narrative on file       Family History   Problem Relation Age of Onset   • Prostate cancer Father    • Heart Attack Mother    • Diabetes Sister        ROS: As per HPI. Additional pertinent systems as noted below.    Review of Systems   Constitutional: Negative for chills and fever.   Eyes: Positive for blurred vision. Negative for double vision.        +floaters with migraine   Respiratory: Positive for cough. Negative for shortness of breath and wheezing.    Cardiovascular: Negative for chest pain and palpitations.   Gastrointestinal: Negative for heartburn.   Musculoskeletal: Positive for myalgias. Negative for joint pain.        Chronic myalgias despite cessation of statin.    Skin: Negative for itching and rash.        New hypermelanotic lesion at end of nose   Neurological: Positive for sensory change and headaches.        Frontal headaches and ongoing polyneuropathy         /70 (BP Location: Left arm, Patient Position: Sitting, BP Cuff Size: Adult)   Pulse 71   Temp 36.4 °C (97.5 °F) (Temporal)   Ht 1.524 m (5')   Wt 68.1 kg (150 lb 3.2 oz)   SpO2 91%   BMI 29.33 kg/m²     Physical Exam   Constitutional: She is oriented to person, place, and time. She appears well-developed and well-nourished. No distress.   HENT:   Head: Normocephalic and atraumatic.   Eyes: Conjunctivae are normal. Right eye exhibits no discharge. Left eye exhibits no discharge. No scleral icterus.   Neck: Thyromegaly present.   There is persistent  supraclavicular fossa fullness on the left. Additionally there is persistent thyromegaly with nodularity   Cardiovascular: Normal rate, regular rhythm and normal heart sounds.  Exam reveals no gallop and no friction rub.    No murmur heard.  Pulmonary/Chest: Effort normal and breath sounds normal. No stridor. No respiratory distress. She has no wheezes. She has no rales.   Abdominal: Soft. Bowel sounds are normal. She exhibits no distension. There is no tenderness. There is no rebound and no guarding.   Musculoskeletal: She exhibits no edema, tenderness or deformity.   There is hard, poorly mobile mass superior to the AC joint on the right. Not a lipoma.   Neurological: She is alert and oriented to person, place, and time.   Skin: Skin is warm and dry. No rash noted. She is not diaphoretic. No erythema. No pallor.   Mildly hyperpigmented macule at end of nose. Irregular borders.   Psychiatric: She has a normal mood and affect. Her behavior is normal. Judgment and thought content normal.       Note: I have reviewed all pertinent labs and diagnostic tests associated with this visit with specific comments listed under the assessment and plan below      Assessment and Plan  Encounter Diagnoses   Name Primary?   • Type 2 diabetes mellitus without complication, without long-term current use of insulin (HCC)    • Myalgia    • Shoulder mass    • Multiple thyroid nodules    • Polyneuropathy    • Lesion of nose    • Abnormal flow volume loop    • Analgesic rebound headache    • Enlarged thyroid    • Gastroesophageal reflux disease without esophagitis      # T2DM  # Polyneuropathy  -DM well-controlled  -will f/u at next visit for ophthalmology results  -polyneuropathy likely not d/t DM given good control, and  B12 wnl  -referring for EMG    # Multiple thyroid nodules  # Thyroid enlarged  # Abnormal flow volume loop  -referred to ENT for laryngoscopy d/t abnormal FVL per Pulmonology recommendation, but nothing abnormal was  found--consider d/t extrinsic compression related to thyroid enlargement/nodules?  -urgent referral placed to IR for FNA/pathology of thyroid nodules.     # Shoulder mass  -XR of the shoulder ordered to further assess    # Analgesic rebound HA  -recommended she reduce analgesic usage to </= 10 days per month    # GERD   -no current sx while not taking PPI or ranitidine  -will f/u at next appt    # Myalgias  -not currently on statin and myalgias are uncommon with gemfibrozil  -ordering CPK    # Skin lesion of nose  -to be scheduled with Dr. Frazier (Dermatology) for appt     Followup: Return in about 5 weeks (around 4/8/2019), or ALSO PLEASE SCHEDULE APPT WITH DR. FRAZIER, for Long.      Signed by: Adriana Almaraz M.D.

## 2019-03-05 ASSESSMENT — ENCOUNTER SYMPTOMS
HEADACHES: 1
WHEEZING: 0
COUGH: 1
FEVER: 0
PALPITATIONS: 0
MYALGIAS: 1
CHILLS: 0
SENSORY CHANGE: 1
HEARTBURN: 0
SHORTNESS OF BREATH: 0
BLURRED VISION: 1
DOUBLE VISION: 0

## 2019-03-07 ENCOUNTER — HOSPITAL ENCOUNTER (OUTPATIENT)
Dept: RADIOLOGY | Facility: MEDICAL CENTER | Age: 69
End: 2019-03-07
Attending: STUDENT IN AN ORGANIZED HEALTH CARE EDUCATION/TRAINING PROGRAM | Admitting: RADIOLOGY
Payer: MEDICARE

## 2019-03-07 DIAGNOSIS — E04.2 MULTIPLE THYROID NODULES: ICD-10-CM

## 2019-03-07 LAB — CYTOLOGY REG CYTOL: NORMAL

## 2019-03-07 PROCEDURE — 88112 CYTOPATH CELL ENHANCE TECH: CPT

## 2019-03-07 PROCEDURE — 10006 FNA BX W/US GDN EA ADDL: CPT

## 2019-03-07 PROCEDURE — 700101 HCHG RX REV CODE 250

## 2019-03-07 RX ORDER — LIDOCAINE HYDROCHLORIDE 20 MG/ML
INJECTION, SOLUTION INFILTRATION; PERINEURAL
Status: COMPLETED
Start: 2019-03-07 | End: 2019-03-07

## 2019-03-07 RX ADMIN — LIDOCAINE HYDROCHLORIDE: 20 INJECTION, SOLUTION INFILTRATION; PERINEURAL at 10:30

## 2019-03-07 NOTE — PROGRESS NOTES
"Patient given [facility] \"Preventing the Spread of Infection\" Brochure upon being checked in.     US guided bilateral thyroid nodule fine needle aspiration done by Dr. Hoffman; NON-SEDATION  (no H&P required as this is a NON SEDATION procedure); bilateral anterior aspect of neck access site; procedural RN: Liz; time out completed by all staff; 2 jars of cytolyt obtained and sent to pathology lab; pt tolerated the procedure well; pt remained stable pre/intra/post procedure; all questions and concerns answered prior to being d/c; patient provided with appropriate education for procedure; pt d/c home.     "

## 2019-04-08 ENCOUNTER — OFFICE VISIT (OUTPATIENT)
Dept: INTERNAL MEDICINE | Facility: MEDICAL CENTER | Age: 69
End: 2019-04-08
Payer: MEDICARE

## 2019-04-08 VITALS
WEIGHT: 151 LBS | HEART RATE: 72 BPM | BODY MASS INDEX: 29.64 KG/M2 | HEIGHT: 60 IN | TEMPERATURE: 97.8 F | DIASTOLIC BLOOD PRESSURE: 82 MMHG | SYSTOLIC BLOOD PRESSURE: 144 MMHG | OXYGEN SATURATION: 98 %

## 2019-04-08 DIAGNOSIS — K21.9 GASTROESOPHAGEAL REFLUX DISEASE, ESOPHAGITIS PRESENCE NOT SPECIFIED: ICD-10-CM

## 2019-04-08 DIAGNOSIS — E04.2 MULTIPLE THYROID NODULES: ICD-10-CM

## 2019-04-08 DIAGNOSIS — L98.9 SKIN LESION OF FACE: ICD-10-CM

## 2019-04-08 DIAGNOSIS — M25.811 MASS OF JOINT OF RIGHT SHOULDER: ICD-10-CM

## 2019-04-08 DIAGNOSIS — G62.9 POLYNEUROPATHY: ICD-10-CM

## 2019-04-08 DIAGNOSIS — G44.40 ANALGESIC REBOUND HEADACHE: ICD-10-CM

## 2019-04-08 DIAGNOSIS — M79.10 MYALGIA: ICD-10-CM

## 2019-04-08 DIAGNOSIS — E11.42 TYPE 2 DIABETES MELLITUS WITH DIABETIC POLYNEUROPATHY, WITHOUT LONG-TERM CURRENT USE OF INSULIN (HCC): ICD-10-CM

## 2019-04-08 DIAGNOSIS — I10 ESSENTIAL HYPERTENSION: ICD-10-CM

## 2019-04-08 DIAGNOSIS — T39.95XA ANALGESIC REBOUND HEADACHE: ICD-10-CM

## 2019-04-08 PROBLEM — K92.1 MELENA: Status: RESOLVED | Noted: 2017-07-21 | Resolved: 2019-04-08

## 2019-04-08 PROCEDURE — 99214 OFFICE O/P EST MOD 30 MIN: CPT | Mod: GC | Performed by: INTERNAL MEDICINE

## 2019-04-08 RX ORDER — LOSARTAN POTASSIUM AND HYDROCHLOROTHIAZIDE 12.5; 5 MG/1; MG/1
1.5 TABLET ORAL
Qty: 90 TAB | Refills: 1 | Status: SHIPPED | OUTPATIENT
Start: 2019-04-08 | End: 2019-06-24 | Stop reason: SDUPTHER

## 2019-04-08 RX ORDER — GABAPENTIN 100 MG/1
100 CAPSULE ORAL 3 TIMES DAILY
Qty: 120 CAP | Refills: 3 | Status: SHIPPED | OUTPATIENT
Start: 2019-04-08 | End: 2019-04-10 | Stop reason: SDUPTHER

## 2019-04-08 ASSESSMENT — ENCOUNTER SYMPTOMS
SENSORY CHANGE: 1
BLURRED VISION: 1
WHEEZING: 0
DOUBLE VISION: 0
CHILLS: 0
FEVER: 0
COUGH: 1
PALPITATIONS: 0
HEARTBURN: 0
HEADACHES: 1
SHORTNESS OF BREATH: 0
MYALGIAS: 1

## 2019-04-08 NOTE — PROGRESS NOTES
Established Patient    Tonya presents today with the following:    CC: presents for f/u of multiple issues including neuropathy, chronic headaches, and shoulder mass    HPI:     # T2DM  # Polyneuropathy  -given T2DM is well-controlled, her significant polyneuropathy is felt likely secondary to a different problem, and her B12 is wnl.   -last visit referred to EMG. However, she has not yet set up appt.   -need to get MICHELLE for ophthalmology results today  -last A1C 01/28/2019 was 6.4, very well controlled; last lipid panel 03/2019 showed total chol 219, , HDL 39, --unfortunately she is statin-intolerant and therefore on , last microalbumin/Cr ratio wnl on 03/2019     # Multiple thyroid nodules  # Hypothyroidism  -previously, had referred to ENT for laryngoscopy d/t abnormal FVL per Pulmonology recommendation, but nothing abnormal was found; given her multiple palpable thyroid nodules at last visit I urgently referred to IR for FNA/pathology of thyroid nodules, but bx's taken were negative for malignancy  -last TSH performed in this last month was also wnl at 1.41  -today she reports no changes in thyroid nodules since last visit     # Shoulder mass  -XR of the shoulder ordered to further assess, not yet done  -today she reports the mass is still present, but unchanged and non-painful.      # Analgesic rebound HA  -recommended she reduce analgesic usage to </= 10 days per month  -today she reports significant improvement in HA (frequency and intensity) since decreasing analgesic use to 3 days in the last month     # GERD   -no current sx while not taking PPI or ranitidine  -today she reports she still has cough but the albuterol inhaler helps     # Myalgias  -not currently on statin due to persistent intolerance even at lower doses/different types trialed. Currently on gemfibrozil, and myalgias are uncommon with gemfibrozil  -ordered CPK at last visit but this was not done     # Skin lesion of  nose  -noted at last appt, at which time I scheduled with Dr. Frazier (Dermatology) for appt   -appears she has not yet seen Dr. Frazier at this time but notes that with application of vaseline the area looks much much better--there is still mild hyperpigmented area but it has decreased and is no longer raised    Preventive care  Flu - utd  TD/TDaP - due  Pneumococcal - utd  Prevnar - utd  Colonoscopy - utd, 2009  Shingrix-due, advising    Diabetes Retinal Screen (Date and result)-utd but need MICHELLE for records today    Women only  Pap - last was 3 years ago, was told the results were normal  Mammogram - utd, 09/2018  Dexa - utd, 04/2018          Patient Active Problem List    Diagnosis Date Noted   • Analgesic rebound headache 01/28/2019   • Hoarseness of voice 01/04/2019   • Mild persistent asthma without complication 01/04/2019   • Chronic cough 08/06/2018   • Myalgia due to statin 05/30/2018   • Osteopenia 05/30/2018   • Melena 07/21/2017   • Type 2 diabetes mellitus (HCC) 05/02/2017   • Essential hypertension 05/02/2017   • Migraine with aura 05/02/2017   • Dyslipidemia 05/02/2017   • GERD (gastroesophageal reflux disease) 05/02/2017   • Primary insomnia 05/02/2017   • Hypothyroidism 05/02/2017   • Retinal detachment 02/12/2013       Current Outpatient Prescriptions   Medication Sig Dispense Refill   • raNITidine (ZANTAC) 150 MG Tab TAKE 1 TABLET TWICE DAILY 180 Tab 3   • tobramycin-dexamethasone (TOBRADEX) 0.3-0.1 % Suspension Place 1 Drop in both eyes 4 times a day. 10 mL 4   • levothyroxine (SYNTHROID) 25 MCG Tab TAKE 1 TABLET EVERY DAY 90 Tab 6   • omeprazole (PRILOSEC) 40 MG delayed-release capsule TAKE 1 CAPSULE EVERY DAY 90 Cap 3   • fluticasone (FLONASE) 50 MCG/ACT nasal spray USE 1 SPRAY NASALLY EVERY DAY AS NEEDED FOR ITCHY, RUNNY NOSE. 16 g 6   • metFORMIN (GLUCOPHAGE) 1000 MG tablet Take 0.5 Tabs by mouth 2 times a day, with meals. 60 Tab 6   • gemfibrozil (LOPID) 600 MG Tab TAKE 1 TABLET TWICE DAILY 180 Tab 7    • SYMBICORT 80-4.5 MCG/ACT Aerosol INHALE 2 PUFFS TWICE DAILY 2 Inhaler 6   • VENTOLIN  (90 Base) MCG/ACT Aero Soln inhalation aerosol INHALE 2 PUFFS EVERY 6 HOURS AS NEEDED FOR SHORTNESS OF BREATH. 2 Inhaler 6   • losartan-hydrochlorothiazide (HYZAAR) 50-12.5 MG per tablet Take 1 Tab by mouth every day. 90 Tab 3   • FLUoxetine (PROZAC) 10 MG Cap TAKE 1 CAPSULE EVERY DAY 90 Cap 3   • acetaminophen (TYLENOL) 500 MG Tab Take 1-2 Tabs by mouth every 6 hours as needed. No more than 3 grams per day. 30 Tab 0   • sumatriptan (IMITREX) 100 MG tablet Take 1 Tab by mouth Once PRN for Migraine for up to 1 dose. 9 Tab 3   • polyvinyl alcohol-povidone (REFRESH) 1.4-0.6 % ophthalmic solution Place 1 Drop in both eyes as needed. 1 Each 3   • acetaminophen (TYLENOL) 500 MG Tab Take 500-1,000 mg by mouth every 6 hours as needed.     • propranolol (INDERAL) 40 MG Tab TAKE 1 TABLET TWICE DAILY 180 Tab 2   • gabapentin (NEURONTIN) 100 MG Cap Take 2 Caps by mouth 3 times a day. 120 Cap 3   • Bismuth Subsalicylate (PEPTO-BISMOL PO) Take  by mouth.     • multivitamin (THERAGRAN) TABS Take 1 Tab by mouth every day.         No current facility-administered medications for this visit.        Social History     Social History   • Marital status:      Spouse name: N/A   • Number of children: N/A   • Years of education: N/A     Occupational History   • Not on file.     Social History Main Topics   • Smoking status: Never Smoker   • Smokeless tobacco: Never Used   • Alcohol use No   • Drug use: No   • Sexual activity: Not on file     Other Topics Concern   • Not on file     Social History Narrative   • No narrative on file       Family History   Problem Relation Age of Onset   • Prostate cancer Father    • Heart Attack Mother    • Diabetes Sister        ROS: As per HPI. Additional pertinent systems as noted below.    Review of Systems   Constitutional: Negative for chills and fever.   Eyes: Positive for blurred vision. Negative  for double vision.        +floaters with migraine   Respiratory: Positive for cough. Negative for shortness of breath and wheezing.    Cardiovascular: Negative for chest pain and palpitations.   Gastrointestinal: Negative for heartburn.   Musculoskeletal: Positive for myalgias. Negative for joint pain.        Chronic myalgias despite cessation of statin.    Skin: Negative for itching and rash.        Nose lesion appears much less pigmented and is no longer raised   Neurological: Positive for sensory change and headaches.        Frontal headaches and ongoing polyneuropathy         /82 (BP Location: Left arm, Patient Position: Sitting, BP Cuff Size: Adult)   Pulse 72   Temp 36.6 °C (97.8 °F) (Temporal)   Ht 1.524 m (5')   Wt 68.5 kg (151 lb)   SpO2 98%   BMI 29.49 kg/m²     Physical Exam   Constitutional: She is oriented to person, place, and time. She appears well-developed and well-nourished. No distress.   HENT:   Head: Normocephalic and atraumatic.   Eyes: Conjunctivae are normal. Right eye exhibits no discharge. Left eye exhibits no discharge. No scleral icterus.   Neck: Thyromegaly present.   There is persistent supraclavicular fossa fullness on the left. Additionally there is persistent thyromegaly with nodularity   Cardiovascular: Normal rate, regular rhythm and normal heart sounds.  Exam reveals no gallop and no friction rub.    No murmur heard.  Pulmonary/Chest: Effort normal and breath sounds normal. No stridor. No respiratory distress. She has no wheezes. She has no rales.   Abdominal: Soft. Bowel sounds are normal. She exhibits no distension. There is no tenderness. There is no rebound and no guarding.   Musculoskeletal: She exhibits no edema, tenderness or deformity.   There is hard, poorly mobile mass superior to the AC joint on the right. Not a lipoma. Unchanged from prior visit.   Neurological: She is alert and oriented to person, place, and time.   Skin: Skin is warm and dry. No rash  noted. She is not diaphoretic. No erythema. No pallor.   macule at end of nose, appears less pigmented, smaller in size, and less raised than prior.    Psychiatric: She has a normal mood and affect. Her behavior is normal. Judgment and thought content normal.       Note: I have reviewed all pertinent labs and diagnostic tests associated with this visit with specific comments listed under the assessment and plan below      Assessment and Plan  1. Polyneuropathy  - she reports she has not been had Rx for gabapentin for 2 months. I am refilling and restarting at a lower dose as she has not been on it for so long (100 mg tid rather than 200 mg tid). Will follow up and increase as needed.  - gave her information for scheduling appt with Physiatry for EMG as previously referred  - gabapentin (NEURONTIN) 100 MG Cap; Take 1 Cap by mouth 3 times a day.  Dispense: 120 Cap; Refill: 3    2. Essential hypertension  - currently taking 1 tab Hyzaar per day, but BP was elevated today with . Increasing Hyzaar to 1.5 tabs per day and asked her to keep BP log and bring back to f/u appt in 5 weeks.  - losartan-hydrochlorothiazide (HYZAAR) 50-12.5 MG per tablet; Take 1.5 Tabs by mouth every day.  Dispense: 90 Tab; Refill: 1    3. Type 2 diabetes mellitus with diabetic polyneuropathy, without long-term current use of insulin (HCC)  - ASCVD 10-year risk is 30.5% but she has persistently been statin-intolerant despite multiple dosing and medication trials  - DM well-controlled at this time  - MICHELLE filled out for DM retinal screen recently performed  - continue gemfibrozil for dyslipidemia and metformin    4. Multiple thyroid nodules  - benign per recent cytology following FNA of two nodules    5. Mass of joint of right shoulder  - reprinted XR order form and gave to patient    6. Analgesic rebound headache  - significant improvement with reduction in analgesic use. Encouraged her to continue to use analgesics as little as possible to  manage her HA.    7. Gastroesophageal reflux disease, esophagitis presence not specified  -continue home ranitidine.     8. Myalgia  - reprinted CPK lab order form and gave to patient    9. Skin lesion of face  - previously referred to Dr. Frazier and she did not have appt yet. However, lesion looks significantly improved from prior. Will f/u in 5 weeks. If continues to resolve will not refer. However, if it does not will re-schedule with Dr. Frazier for biopsy.      Followup: Return in about 5 weeks (around 5/13/2019).      Signed by: Adriana Almaraz M.D.

## 2019-04-08 NOTE — PATIENT INSTRUCTIONS
Please get the TdaP booster and shingrix vaccines when you are able from your pharmacy (I realize they are often out). I am reprinting the [facility] order form for you and a lab order form from last visit and want you to get these before your next follow up. I also provided the information to set up an appointment with Physiatry to get an EMG. I would like you to take 1.5 tabs per day of the Hyzaar instead of 1, and would like you to keep a blood pressure log (3-4 days per week) and bring it back to your next appt. Please try to keep to a low salt (less than 2 grams per day sodium) diet to help with your blood pressure.

## 2019-04-10 DIAGNOSIS — G62.9 POLYNEUROPATHY: ICD-10-CM

## 2019-04-10 RX ORDER — GABAPENTIN 100 MG/1
100 CAPSULE ORAL 3 TIMES DAILY
Qty: 120 CAP | Refills: 3 | Status: SHIPPED | OUTPATIENT
Start: 2019-04-10 | End: 2022-01-21

## 2019-04-17 ENCOUNTER — HOSPITAL ENCOUNTER (OUTPATIENT)
Dept: LAB | Facility: MEDICAL CENTER | Age: 69
End: 2019-04-17
Attending: STUDENT IN AN ORGANIZED HEALTH CARE EDUCATION/TRAINING PROGRAM
Payer: MEDICARE

## 2019-04-17 ENCOUNTER — HOSPITAL ENCOUNTER (OUTPATIENT)
Dept: RADIOLOGY | Facility: MEDICAL CENTER | Age: 69
End: 2019-04-17
Attending: STUDENT IN AN ORGANIZED HEALTH CARE EDUCATION/TRAINING PROGRAM
Payer: MEDICARE

## 2019-04-17 DIAGNOSIS — M79.10 MYALGIA: ICD-10-CM

## 2019-04-17 DIAGNOSIS — R22.30 SHOULDER MASS: ICD-10-CM

## 2019-04-17 DIAGNOSIS — E11.9 TYPE 2 DIABETES MELLITUS WITHOUT COMPLICATION, WITHOUT LONG-TERM CURRENT USE OF INSULIN (HCC): ICD-10-CM

## 2019-04-17 DIAGNOSIS — G62.89 OTHER POLYNEUROPATHY: ICD-10-CM

## 2019-04-17 DIAGNOSIS — E11.42 TYPE 2 DIABETES MELLITUS WITH DIABETIC POLYNEUROPATHY, WITHOUT LONG-TERM CURRENT USE OF INSULIN (HCC): ICD-10-CM

## 2019-04-17 DIAGNOSIS — E03.9 HYPOTHYROIDISM, UNSPECIFIED TYPE: ICD-10-CM

## 2019-04-17 DIAGNOSIS — E78.5 DYSLIPIDEMIA: ICD-10-CM

## 2019-04-17 LAB
ALBUMIN SERPL BCP-MCNC: 4.4 G/DL (ref 3.2–4.9)
ALBUMIN/GLOB SERPL: 1.2 G/DL
ALP SERPL-CCNC: 56 U/L (ref 30–99)
ALT SERPL-CCNC: 19 U/L (ref 2–50)
ANION GAP SERPL CALC-SCNC: 11 MMOL/L (ref 0–11.9)
AST SERPL-CCNC: 16 U/L (ref 12–45)
BILIRUB SERPL-MCNC: 0.4 MG/DL (ref 0.1–1.5)
BUN SERPL-MCNC: 17 MG/DL (ref 8–22)
CALCIUM SERPL-MCNC: 9.8 MG/DL (ref 8.5–10.5)
CHLORIDE SERPL-SCNC: 105 MMOL/L (ref 96–112)
CHOLEST SERPL-MCNC: 207 MG/DL (ref 100–199)
CK SERPL-CCNC: 88 U/L (ref 0–154)
CO2 SERPL-SCNC: 24 MMOL/L (ref 20–33)
CREAT SERPL-MCNC: 0.92 MG/DL (ref 0.5–1.4)
FASTING STATUS PATIENT QL REPORTED: NORMAL
GLOBULIN SER CALC-MCNC: 3.6 G/DL (ref 1.9–3.5)
GLUCOSE SERPL-MCNC: 127 MG/DL (ref 65–99)
HDLC SERPL-MCNC: 40 MG/DL
LDLC SERPL CALC-MCNC: ABNORMAL MG/DL
POTASSIUM SERPL-SCNC: 3.9 MMOL/L (ref 3.6–5.5)
PROT SERPL-MCNC: 8 G/DL (ref 6–8.2)
SODIUM SERPL-SCNC: 140 MMOL/L (ref 135–145)
TRIGL SERPL-MCNC: 430 MG/DL (ref 0–149)
TSH SERPL DL<=0.005 MIU/L-ACNC: 2.57 UIU/ML (ref 0.38–5.33)
VIT B12 SERPL-MCNC: 1027 PG/ML (ref 211–911)

## 2019-04-17 PROCEDURE — 80053 COMPREHEN METABOLIC PANEL: CPT

## 2019-04-17 PROCEDURE — 36415 COLL VENOUS BLD VENIPUNCTURE: CPT

## 2019-04-17 PROCEDURE — 82550 ASSAY OF CK (CPK): CPT

## 2019-04-17 PROCEDURE — 80061 LIPID PANEL: CPT

## 2019-04-17 PROCEDURE — 84443 ASSAY THYROID STIM HORMONE: CPT

## 2019-04-17 PROCEDURE — 73030 X-RAY EXAM OF SHOULDER: CPT | Mod: RT

## 2019-04-17 PROCEDURE — 82607 VITAMIN B-12: CPT

## 2019-05-15 ENCOUNTER — OFFICE VISIT (OUTPATIENT)
Dept: INTERNAL MEDICINE | Facility: MEDICAL CENTER | Age: 69
End: 2019-05-15
Payer: MEDICARE

## 2019-05-15 VITALS
TEMPERATURE: 97.6 F | HEART RATE: 78 BPM | WEIGHT: 152.4 LBS | BODY MASS INDEX: 29.92 KG/M2 | OXYGEN SATURATION: 94 % | HEIGHT: 60 IN | DIASTOLIC BLOOD PRESSURE: 63 MMHG | SYSTOLIC BLOOD PRESSURE: 114 MMHG

## 2019-05-15 DIAGNOSIS — Z78.9 STATIN INTOLERANCE: ICD-10-CM

## 2019-05-15 DIAGNOSIS — M79.662 PAIN IN BOTH LOWER LEGS: ICD-10-CM

## 2019-05-15 DIAGNOSIS — I10 ESSENTIAL HYPERTENSION: ICD-10-CM

## 2019-05-15 DIAGNOSIS — M79.661 PAIN IN BOTH LOWER LEGS: ICD-10-CM

## 2019-05-15 DIAGNOSIS — M19.011 PRIMARY OSTEOARTHRITIS OF RIGHT SHOULDER: ICD-10-CM

## 2019-05-15 DIAGNOSIS — E11.9 TYPE 2 DIABETES MELLITUS WITHOUT COMPLICATION, WITHOUT LONG-TERM CURRENT USE OF INSULIN (HCC): ICD-10-CM

## 2019-05-15 DIAGNOSIS — H57.13 PAIN OF BOTH EYES: ICD-10-CM

## 2019-05-15 DIAGNOSIS — R05.3 CHRONIC COUGH: ICD-10-CM

## 2019-05-15 DIAGNOSIS — Z91.89 HIGH RISK OF CARDIAC EVENT: ICD-10-CM

## 2019-05-15 DIAGNOSIS — J45.40 MODERATE PERSISTENT ASTHMA WITHOUT COMPLICATION: ICD-10-CM

## 2019-05-15 DIAGNOSIS — M79.10 MYALGIA: ICD-10-CM

## 2019-05-15 LAB
HBA1C MFR BLD: 6.5 % (ref 0–5.6)
INT CON NEG: ABNORMAL
INT CON POS: ABNORMAL

## 2019-05-15 PROCEDURE — 83036 HEMOGLOBIN GLYCOSYLATED A1C: CPT | Mod: GC | Performed by: INTERNAL MEDICINE

## 2019-05-15 PROCEDURE — 99214 OFFICE O/P EST MOD 30 MIN: CPT | Mod: GC | Performed by: INTERNAL MEDICINE

## 2019-05-15 RX ORDER — BUDESONIDE AND FORMOTEROL FUMARATE DIHYDRATE 160; 4.5 UG/1; UG/1
2 AEROSOL RESPIRATORY (INHALATION) 2 TIMES DAILY
Qty: 1 INHALER | Refills: 5 | Status: SHIPPED | OUTPATIENT
Start: 2019-05-15 | End: 2019-06-17

## 2019-05-15 RX ORDER — EZETIMIBE 10 MG/1
10 TABLET ORAL DAILY
Qty: 60 TAB | Refills: 2 | Status: SHIPPED | OUTPATIENT
Start: 2019-05-15 | End: 2022-01-21

## 2019-05-15 ASSESSMENT — ENCOUNTER SYMPTOMS
SENSORY CHANGE: 1
FEVER: 0
EYE PAIN: 1
HEADACHES: 0
BLURRED VISION: 1
SHORTNESS OF BREATH: 0
COUGH: 1
PALPITATIONS: 0
CHILLS: 0
HEARTBURN: 0
DOUBLE VISION: 0
WHEEZING: 1

## 2019-05-15 ASSESSMENT — PAIN SCALES - GENERAL: PAINLEVEL: NO PAIN

## 2019-05-15 NOTE — LETTER
[facility]  Adriana Almaraz M.D.  [address]  [place] [address]  Fax: [phone]   Authorization for Release/Disclosure of   Protected Health Information   Name: TONYA ESTES : 1950 SSN: [id number]   Address: [facility] [address] [facility] [address]  [place] [address] Phone:    [phone] (home)    I authorize the entity listed below to release/disclose the PHI below to:   [facility]/Adriana Almaraz M.D. and Adriana Almaraz M.D.   Provider or Entity Name:  [facility]    Address   City, [facility], [facility]   Phone:  [phone]    Fax:     Reason for request: continuity of care   Information to be released:    [  ] LAST COLONOSCOPY,  including any PATH REPORT and follow-up  [  ] LAST FIT/COLOGUARD RESULT [  ] LAST DEXA  [  ] LAST MAMMOGRAM  [  ] LAST PAP  [  ] LAST LABS [  ] RETINA EXAM REPORT  [  ] IMMUNIZATION RECORDS  [  ] Release all info      [  ] Check here and initial the line next to each item to release ALL health information INCLUDING  _____ Care and treatment for drug and / or alcohol abuse  _____ HIV testing, infection status, or AIDS  _____ Genetic Testing    DATES OF SERVICE OR TIME PERIOD TO BE DISCLOSED: _____________  I understand and acknowledge that:  * This Authorization may be revoked at any time by you in writing, except if your health information has already been used or disclosed.  * Your health information that will be used or disclosed as a result of you signing this authorization could be re-disclosed by the recipient. If this occurs, your re-disclosed health information may no longer be protected by State or Federal laws.  * You may refuse to sign this Authorization. Your refusal will not affect your ability to obtain treatment.  * This Authorization becomes effective upon signing and will  on (date) __________.      If no date is indicated, this Authorization will  one (1) year from the signature date.    Name: Tonya Estes    Signature:   Date:     5/15/2019       PLEASE  FAX REQUESTED RECORDS BACK TO: [phone]

## 2019-05-15 NOTE — PATIENT INSTRUCTIONS
[facility] call [facility] to [facility]:    [facility]  [address]   [place] [id number] [phone]    Please keep a blood pressure log and bring that to our next appointment (taking blood pressures 2-4 times per week between appointments and writing them down on paper, then bringing the paper to the appointment). I am adding ezetimibe 10 mg per day to your medications for heart protection and you will continue the gemfibrozil. I am increasing your symbicort dosing to help better control your cough.

## 2019-05-15 NOTE — PROGRESS NOTES
"Established Patient    Tonya presents today with the following:    CC:presents for multiple issues including HTN, T2DM, polyneuropathy, and more.    HPI:     # Pain both lower legs  - refilled gabapentin at last visit and gave her information for scheduling appt with Physiatry for EMG as previously referred  - elevated B12, DM well-controlled, and she does not drink alcohol  - she has not yet gotten EMG; I again gave her information re: scheduling this today  - while she has previously described the pain as burning pain suggestive of polyneuropathy, today, she describes it as a more \"muscular\" pain she feels behind her knees and in her calves, and states it is worse with exertion, implicating possible claudication.     # Essential hypertension  - currently taking 1 tab Hyzaar per day, but BP was elevated today with . Increasing Hyzaar to 1.5 tabs per day and asked her to keep BP log and bring back to f/u appt in 5 weeks.  - she has been taking the 1.5 tabs per day and BPs averaging 120s/80s. Did not make or bring log; I asked them to make log and bring to next visit.     # Type 2 diabetes mellitus with diabetic polyneuropathy, without long-term current use of insulin (HCC)  - ASCVD 10-year risk is 30.5% but she has persistently been statin-intolerant despite multiple dosing and medication trials  - DM well-controlled at this time  - utd on lipid profile (04/17) with total chol 207 and ; utd on microalb/Cr ratio (03/2019) and wnl; needs A1C today  - MICHELLE filled out for DM retinal screen recently performed; we are still awaiting the results. MICHELLE re-sent again today.     # Mass of joint of right shoulder  - reprinted XR order form and gave to patient; she obtained the XR and it showed mild glenohumeral joint degenerative changes only.  - no new complaints at this time.     # Myalgia  - reprinted CPK lab order form and gave to her at last appt  - it was wnl  -cause unclear, but symptoms currently " improved from prior (except for muscle aches in calves as described above with exertion)     # Asthma  -uses albuterol, flonase, and symbicort but only help a limited amount and she still has cough  -she currently has 3 nighttime awakenings with cough each week  -she is using the albuterol 3-4 times per day    # Eye pain  -sharp eye pain b/l began 2 weeks ago associated with floaters she has had more chronically  -no eye pressure, headache, nausea/vomiting, acute vision loss        Patient Active Problem List    Diagnosis Date Noted   • Multiple thyroid nodules 04/08/2019   • Mass of joint of right shoulder 04/08/2019   • Skin lesion of face 04/08/2019   • Analgesic rebound headache 01/28/2019   • Hoarseness of voice 01/04/2019   • Mild persistent asthma without complication 01/04/2019   • Chronic cough 08/06/2018   • Myalgia 05/30/2018   • Osteopenia 05/30/2018   • Type 2 diabetes mellitus (HCC) 05/02/2017   • Essential hypertension 05/02/2017   • Migraine with aura 05/02/2017   • Dyslipidemia 05/02/2017   • GERD (gastroesophageal reflux disease) 05/02/2017   • Primary insomnia 05/02/2017   • Hypothyroidism 05/02/2017   • Retinal detachment 02/12/2013       Current Outpatient Prescriptions   Medication Sig Dispense Refill   • gabapentin (NEURONTIN) 100 MG Cap Take 1 Cap by mouth 3 times a day. 120 Cap 3   • losartan-hydrochlorothiazide (HYZAAR) 50-12.5 MG per tablet Take 1.5 Tabs by mouth every day. 90 Tab 1   • raNITidine (ZANTAC) 150 MG Tab TAKE 1 TABLET TWICE DAILY 180 Tab 3   • tobramycin-dexamethasone (TOBRADEX) 0.3-0.1 % Suspension Place 1 Drop in both eyes 4 times a day. 10 mL 4   • levothyroxine (SYNTHROID) 25 MCG Tab TAKE 1 TABLET EVERY DAY 90 Tab 6   • fluticasone (FLONASE) 50 MCG/ACT nasal spray USE 1 SPRAY NASALLY EVERY DAY AS NEEDED FOR ITCHY, RUNNY NOSE. 16 g 6   • metFORMIN (GLUCOPHAGE) 1000 MG tablet Take 0.5 Tabs by mouth 2 times a day, with meals. 60 Tab 6   • gemfibrozil (LOPID) 600 MG Tab TAKE 1  TABLET TWICE DAILY 180 Tab 7   • SYMBICORT 80-4.5 MCG/ACT Aerosol INHALE 2 PUFFS TWICE DAILY 2 Inhaler 6   • VENTOLIN  (90 Base) MCG/ACT Aero Soln inhalation aerosol INHALE 2 PUFFS EVERY 6 HOURS AS NEEDED FOR SHORTNESS OF BREATH. 2 Inhaler 6   • sumatriptan (IMITREX) 100 MG tablet Take 1 Tab by mouth Once PRN for Migraine for up to 1 dose. 9 Tab 3   • polyvinyl alcohol-povidone (REFRESH) 1.4-0.6 % ophthalmic solution Place 1 Drop in both eyes as needed. 1 Each 3   • multivitamin (THERAGRAN) TABS Take 1 Tab by mouth every day.         No current facility-administered medications for this visit.        Social History     Social History   • Marital status:      Spouse name: N/A   • Number of children: N/A   • Years of education: N/A     Occupational History   • Not on file.     Social History Main Topics   • Smoking status: Never Smoker   • Smokeless tobacco: Never Used   • Alcohol use No   • Drug use: No   • Sexual activity: Not on file     Other Topics Concern   • Not on file     Social History Narrative   • No narrative on file       Family History   Problem Relation Age of Onset   • Prostate cancer Father    • Heart Attack Mother    • Diabetes Sister        ROS: As per HPI. Additional pertinent systems as noted below.    Review of Systems   Constitutional: Negative for chills and fever.   Eyes: Positive for blurred vision and pain. Negative for double vision.        No more migraine ,but bilateral eye pain during the day   Respiratory: Positive for cough and wheezing. Negative for shortness of breath.         Cough and wheezing   Cardiovascular: Negative for chest pain and palpitations.   Gastrointestinal: Negative for heartburn.   Musculoskeletal: Negative for joint pain and myalgias.        Myalgias improved at this time.   Skin: Negative for itching and rash.        Nose lesion previously noted again appears much less pigmented and is no longer raised   Neurological: Positive for sensory change.  Negative for headaches.        Pain behind knee worse with exertion         /63 (BP Location: Right arm, Patient Position: Sitting)   Pulse 78   Temp 36.4 °C (97.6 °F) (Temporal)   Ht 1.524 m (5')   Wt 69.1 kg (152 lb 6.4 oz)   SpO2 94%   BMI 29.76 kg/m²     Physical Exam   Constitutional: She is oriented to person, place, and time. She appears well-developed and well-nourished. No distress.   Polish-speaking, pleasant woman in NAD. Appears younger than stated age.   HENT:   Head: Normocephalic and atraumatic.   Eyes: Conjunctivae are normal. Right eye exhibits no discharge. Left eye exhibits no discharge. No scleral icterus.   Cardiovascular: Normal rate, regular rhythm and normal heart sounds.  Exam reveals no gallop and no friction rub.    No murmur heard.  Pulmonary/Chest: Effort normal and breath sounds normal. No stridor. No respiratory distress. She has no wheezes. She has no rales.   Abdominal: Soft. Bowel sounds are normal. She exhibits no distension. There is no tenderness. There is no rebound and no guarding.   Musculoskeletal: She exhibits no edema, tenderness or deformity.   Neurological: She is alert and oriented to person, place, and time.   Skin: Skin is warm and dry. No rash noted. She is not diaphoretic. No erythema. No pallor.   macule at end of nose, appears less pigmented, smaller in size, and less raised than prior.    Psychiatric: She has a normal mood and affect. Her behavior is normal. Judgment and thought content normal.       Note: I have reviewed all pertinent labs and diagnostic tests associated with this visit with specific comments listed under the assessment and plan below      Assessment and Plan  1. Pain in both lower legs  - unclear from her varying descriptions from prior appts vs this appt whether this is polyneuropathy (description at prior appointments more consistent with this)--in which case, cause is unclear as B12 is elevated, she does not drink, and her DM is  well-controlled--or claudication (more fitting with description at this appt, and fitting with her DLD)  - encouraged her to schedule the EMG  - also ordered GIANNI this visit:    -US-EXTREMITY ARTERY LOWER BILAT W/GIANNI (COMBO); Future    2. Type 2 diabetes mellitus without complication, without long-term current use of insulin (HCC)  3. High risk of cardiac event  4. Statin intolerance  - POCT  A1C this visit was 6.5%, well-controlled  - she has significant DLD with TG elevation most notable in 400s  - her cardiac risk score is also >30% and she is statin-intolerant despite numerous trials  - continuing gemfibrozil for significant hypertrygliceridemia, and adding Zetia for further cardiovascular risk reduction   -ezetimibe (ZETIA) 10 MG Tab; Take 1 Tab by mouth every day.  Dispense: 60 Tab; Refill: 2  - will f/u in 5 weeks and inquire about any side effects with the combination    5. Chronic cough  6. Moderate persistent asthma without complication  - although previously classed as mild persistent asthma, her description of 3 nighttime awakenings per week and using albuterol 3-4 times per day is more consistent with moderate persistent  - therefore will now double her Symbicort dosage and reevaluate for improvement at next appt:   - budesonide-formoterol (SYMBICORT) 160-4.5 MCG/ACT Aerosol; Inhale 2 Puffs by mouth 2 Times a Day.  Dispense: 1 Inhaler; Refill: 5    7. Essential hypertension  - BP well controlled at today's visit () but she did not bring in log as requested  - continue current Hyzaar regimen for now and again recommended she bring a BP log back to next appt    8. Myalgia  - improved at this time. CPK wnl. NTD for now.    9. Pain of both eyes  - advised she see Ophthalmology for this (she is already established)    10. Primary osteoarthritis of right shoulder  - mass previously palpated not pathologic per imaging.   - NTD for now. If pain at shoulder at future visits will recommend NSAIDs for  anti-inflammatory effect    Followup: Return in about 5 weeks (around 6/19/2019).      Signed by: Adriana Almaraz M.D.

## 2019-05-16 ASSESSMENT — ENCOUNTER SYMPTOMS: MYALGIAS: 0

## 2019-05-21 ENCOUNTER — TELEPHONE (OUTPATIENT)
Dept: INTERNAL MEDICINE | Facility: MEDICAL CENTER | Age: 69
End: 2019-05-21

## 2019-05-21 DIAGNOSIS — M79.605 PAIN IN BOTH LOWER EXTREMITIES: ICD-10-CM

## 2019-05-21 DIAGNOSIS — M79.604 PAIN IN BOTH LOWER EXTREMITIES: ICD-10-CM

## 2019-05-21 DIAGNOSIS — E11.59 TYPE 2 DIABETES MELLITUS WITH OTHER CIRCULATORY COMPLICATIONS (HCC): ICD-10-CM

## 2019-05-21 DIAGNOSIS — E78.1 HYPERTRIGLYCERIDEMIA: ICD-10-CM

## 2019-05-21 DIAGNOSIS — E78.2 MIXED HYPERLIPIDEMIA: ICD-10-CM

## 2019-05-21 NOTE — TELEPHONE ENCOUNTER
Received fax from [facility] Scheduling   Phone: [phone]  Fax:[phone]    ABN did not pass medicare for : US artery lower ext bilat    This means insurance may not cover the imaging and the patient will have to pay out of pocket. Please fax and updated order with a new adjusted ICD-10 to avoid this from happening.     pcp please advise

## 2019-05-29 ENCOUNTER — HOSPITAL ENCOUNTER (OUTPATIENT)
Dept: RADIOLOGY | Facility: MEDICAL CENTER | Age: 69
End: 2019-05-29
Attending: STUDENT IN AN ORGANIZED HEALTH CARE EDUCATION/TRAINING PROGRAM
Payer: MEDICARE

## 2019-05-29 DIAGNOSIS — M79.661 PAIN IN BOTH LOWER LEGS: ICD-10-CM

## 2019-05-29 DIAGNOSIS — M79.662 PAIN IN BOTH LOWER LEGS: ICD-10-CM

## 2019-05-29 PROCEDURE — 93922 UPR/L XTREMITY ART 2 LEVELS: CPT | Mod: 26,GZ | Performed by: SURGERY

## 2019-05-29 PROCEDURE — 93922 UPR/L XTREMITY ART 2 LEVELS: CPT

## 2019-05-31 NOTE — TELEPHONE ENCOUNTER
Pt ha US done on 5/29/19. Called imaging department to inform them pcp has updated ICD-10 code. They had no idea what I was talking about and stated they did not know If US was covered or if pt paid for US.  I was then transferred to billing department where I was also informed they knew nothing about needing an updated code. They stated since US was done a few days ago it has not been billed to pts insurance so they are not sure if it will or will not be covered. Informed them pcp has placed a new order with updated dx codes. Gave them my extension to call me if they have further questions.

## 2019-05-31 NOTE — TELEPHONE ENCOUNTER
Is there a list of insurance codes this would be covered for? For instance: hyperlipidemia, dyslipidemia, or hypertriglyceridemia? She has all of these. Also, can a Tunisian-speaking MA please call her and let her know to hold off on getting the test until we get this sorted? She does not have MyChart and telephone communication is a bit more difficult than in-person communication because of the language barrier (her  usually translates for her in the office).    For now I will reorder linking to these diagnoses; let me know if it will not pass.

## 2019-06-17 ENCOUNTER — OFFICE VISIT (OUTPATIENT)
Dept: INTERNAL MEDICINE | Facility: MEDICAL CENTER | Age: 69
End: 2019-06-17
Payer: MEDICARE

## 2019-06-17 VITALS
SYSTOLIC BLOOD PRESSURE: 122 MMHG | RESPIRATION RATE: 20 BRPM | OXYGEN SATURATION: 97 % | DIASTOLIC BLOOD PRESSURE: 60 MMHG | TEMPERATURE: 97.4 F | HEART RATE: 67 BPM | WEIGHT: 152.8 LBS | HEIGHT: 60 IN | BODY MASS INDEX: 30 KG/M2

## 2019-06-17 DIAGNOSIS — I10 ESSENTIAL HYPERTENSION: ICD-10-CM

## 2019-06-17 DIAGNOSIS — R60.9 SWELLING: ICD-10-CM

## 2019-06-17 DIAGNOSIS — M79.605 PAIN IN BOTH LOWER EXTREMITIES: ICD-10-CM

## 2019-06-17 DIAGNOSIS — R05.3 CHRONIC COUGH: ICD-10-CM

## 2019-06-17 DIAGNOSIS — M79.604 PAIN IN BOTH LOWER EXTREMITIES: ICD-10-CM

## 2019-06-17 DIAGNOSIS — E11.42 TYPE 2 DIABETES MELLITUS WITH DIABETIC POLYNEUROPATHY, WITHOUT LONG-TERM CURRENT USE OF INSULIN (HCC): ICD-10-CM

## 2019-06-17 PROBLEM — M79.10 MYALGIA: Status: RESOLVED | Noted: 2018-05-30 | Resolved: 2019-06-17

## 2019-06-17 PROCEDURE — 99214 OFFICE O/P EST MOD 30 MIN: CPT | Mod: GC | Performed by: INTERNAL MEDICINE

## 2019-06-17 RX ORDER — OMEPRAZOLE 20 MG/1
20 CAPSULE, DELAYED RELEASE ORAL DAILY
Qty: 30 CAP | Refills: 1 | Status: SHIPPED | OUTPATIENT
Start: 2019-06-17 | End: 2022-01-21

## 2019-06-17 NOTE — PROGRESS NOTES
Established Patient    Tonya presents today with the following:    CC:   Chief Complaint   Patient presents with   • Pain     leg pain   • Hypertension     follow up    • Cough     x 1 year      Son is in the room to interpret for her    HPI:   69-year-old female with history of hypertension, diabetes type 2, who is here for evaluation of lower extremity pain, and chronic cough  She was seen and the lower extremity previously thought to be associated with claudication ultrasound Doppler with GIANNI was done and came back normal.  Patient continued to have pain mostly in the popliteal area bilaterally.  There is no recent trauma, no redness.  No associated numbness or tingling sensation in extremity  Diabetes is well controlled    Patient also continue to complain of chronic cough which is been there for over the past 1 year.  Has had PFT with no evidence of asthma.  She did however complain that cough is worse at night, however not related to recumbency.  She is currently on ranitidine.  She continues to have acid reflux symptoms.  No nausea-fever, chills, nausea, vomiting.      ROS:  Constitutional: No fever, no chills,  Respiratory: +cough, no hemoptysis,  Cardiovascular: No chest pain, no palpitations, no orthopnea, no PND, no lower extremity swelling  GI  : No nausea, no vomiting, no abdominal pain, no diarrhea, no constipation, no hematochezia  : No dysuria, no urgency, no hesitancy, no nocturia, no frequency, no hematuria  Endocrine: No polyuria, no polydipsia,  Hematology: No easy bruisability, no bleeding  Musculoskeletal: No joint swelling, no joint redness,  Neuro: No seizures, no numbness, no tingling sensation, no extremity weakness, no change in vision, no hearing impairment  Psychiatry: no depression, no suicidal ideation      Patient Active Problem List    Diagnosis Date Noted   • Pain in both lower extremities 06/17/2019   • Swelling in popliteal areas bilaterally 06/17/2019   • Multiple  thyroid nodules 04/08/2019   • Mass of joint of right shoulder 04/08/2019   • Skin lesion of face 04/08/2019   • Analgesic rebound headache 01/28/2019   • Hoarseness of voice 01/04/2019   • Mild persistent asthma without complication 01/04/2019   • Chronic cough 08/06/2018   • Osteopenia 05/30/2018   • Type 2 diabetes mellitus (HCC) 05/02/2017   • Essential hypertension 05/02/2017   • Migraine with aura 05/02/2017   • Dyslipidemia 05/02/2017   • GERD (gastroesophageal reflux disease) 05/02/2017   • Primary insomnia 05/02/2017   • Hypothyroidism 05/02/2017       Current Outpatient Prescriptions   Medication Sig Dispense Refill   • omeprazole (PRILOSEC) 20 MG delayed-release capsule Take 1 Cap by mouth every day. 30 Cap 1   • ezetimibe (ZETIA) 10 MG Tab Take 1 Tab by mouth every day. 60 Tab 2   • gabapentin (NEURONTIN) 100 MG Cap Take 1 Cap by mouth 3 times a day. 120 Cap 3   • losartan-hydrochlorothiazide (HYZAAR) 50-12.5 MG per tablet Take 1.5 Tabs by mouth every day. 90 Tab 1   • raNITidine (ZANTAC) 150 MG Tab TAKE 1 TABLET TWICE DAILY 180 Tab 3   • tobramycin-dexamethasone (TOBRADEX) 0.3-0.1 % Suspension Place 1 Drop in both eyes 4 times a day. 10 mL 4   • levothyroxine (SYNTHROID) 25 MCG Tab TAKE 1 TABLET EVERY DAY 90 Tab 6   • fluticasone (FLONASE) 50 MCG/ACT nasal spray USE 1 SPRAY NASALLY EVERY DAY AS NEEDED FOR ITCHY, RUNNY NOSE. 16 g 6   • metFORMIN (GLUCOPHAGE) 1000 MG tablet Take 0.5 Tabs by mouth 2 times a day, with meals. 60 Tab 6   • gemfibrozil (LOPID) 600 MG Tab TAKE 1 TABLET TWICE DAILY 180 Tab 7   • VENTOLIN  (90 Base) MCG/ACT Aero Soln inhalation aerosol INHALE 2 PUFFS EVERY 6 HOURS AS NEEDED FOR SHORTNESS OF BREATH. 2 Inhaler 6   • polyvinyl alcohol-povidone (REFRESH) 1.4-0.6 % ophthalmic solution Place 1 Drop in both eyes as needed. 1 Each 3   • multivitamin (THERAGRAN) TABS Take 1 Tab by mouth every day.       • sumatriptan (IMITREX) 100 MG tablet Take 1 Tab by mouth Once PRN for Migraine  for up to 1 dose. 9 Tab 3     No current facility-administered medications for this visit.              /60 (BP Location: Left arm, Patient Position: Sitting, BP Cuff Size: Adult)   Pulse 67   Temp 36.3 °C (97.4 °F) (Temporal)   Resp 20   Ht 1.524 m (5')   Wt 69.3 kg (152 lb 12.8 oz)   SpO2 97%   BMI 29.84 kg/m²     Physical Exam   General: Alert female, not in distress,   HEENT: Normocephalic, atraumatic, no jaundice or scleral icterus, no pallor, No cervical lymphadenopathy, no thyromegaly,  No tonsillar exudate, no throat erythyema,  PERLL, EOMI,   Respiratory:lungs clear to auscultation b/l, breath sounds vesicular, air entry adequate b/l,no wheezing, rales or crackles  Cardiac:Regular, rate and rhythm, , S1/S2 present, no M/R/G  GI: abdomen non distended, soft, non tender, no organomegaly, bowel sounds normoactive  Neuro: AAOX4, CN II-XII intact, sensation intact, strength 5/5 in all extremities, Gait is normal,   no nystagmus  Psychiatry: Good judgment, good mood  Msk/Extremities: radial, dorsalis pedis pulses 2+b/l, no joint swelling or redness, ROM intact,   Point tenderness in the popliteal area bilaterally, there appears to be mild swelling,?  Fatty tissues vs cyst  Skin: No rash      Note: I have reviewed all pertinent labs and diagnostic tests associated with this visit with specific comments listed under the assessment and plan below    Assessment and Plan    1. Chronic cough likely in setting of GERD versus asthma  PFT have been normal  Symbicort no working  No any other chest symptoms  -We will start patient on omeprazole trial, 20 mg daily for 4 to 6 weeks  -Chest x-ray    2. Pain in both lower extremities  3.?  Swelling in popliteal areas bilaterally  Patient has had ultrasound Doppler with GIANNI and lower extremity, get if  Pain more localized into the popliteal iliac bilaterally.  There appears to be swelling in both popliteal fossa,   fatty tissue/lipoma versus?  Baker's cyst   -We  will do ultrasound of both popliteal fossa to assess    4. Type 2 diabetes mellitus with diabetic polyneuropathy, without long-term current use of insulin (HCC)  Well controlled, A1c 6.5  Continue metformin    5.  Hypertension  Blood pressure currently within goal  Continue current antihypertensive medication      Followup: 1 month      Signed by: Griffin Salinas M.D.     A computerized dictation system may have been used for this note.    Despite review, there may be some spelling or grammatical errors.

## 2019-06-24 DIAGNOSIS — I10 ESSENTIAL HYPERTENSION: ICD-10-CM

## 2019-06-24 NOTE — TELEPHONE ENCOUNTER
Last seen: 06/17/19 by Dr. Salinas  Next appt: 09/30/19 with Dr. Almaraz    Was the patient seen in the last year in this department? Yes   Does patient have an active prescription for medications requested? No   Received Request Via: Pharmacy

## 2019-06-26 RX ORDER — LOSARTAN POTASSIUM AND HYDROCHLOROTHIAZIDE 12.5; 5 MG/1; MG/1
TABLET ORAL
Qty: 135 TAB | Refills: 2 | Status: SHIPPED | OUTPATIENT
Start: 2019-06-26 | End: 2022-01-21

## 2019-07-09 ENCOUNTER — HOSPITAL ENCOUNTER (OUTPATIENT)
Dept: RADIOLOGY | Facility: MEDICAL CENTER | Age: 69
End: 2019-07-09
Attending: STUDENT IN AN ORGANIZED HEALTH CARE EDUCATION/TRAINING PROGRAM
Payer: MEDICARE

## 2019-07-09 DIAGNOSIS — R05.3 CHRONIC COUGH: ICD-10-CM

## 2019-07-09 DIAGNOSIS — M79.605 PAIN IN BOTH LOWER EXTREMITIES: ICD-10-CM

## 2019-07-09 DIAGNOSIS — M79.604 PAIN IN BOTH LOWER EXTREMITIES: ICD-10-CM

## 2019-07-09 DIAGNOSIS — R60.9 SWELLING: ICD-10-CM

## 2019-07-09 PROCEDURE — 71046 X-RAY EXAM CHEST 2 VIEWS: CPT

## 2019-07-09 PROCEDURE — 76881 US COMPL JOINT R-T W/IMG: CPT

## 2019-07-09 PROCEDURE — 73562 X-RAY EXAM OF KNEE 3: CPT | Mod: RT

## 2020-03-18 ENCOUNTER — HOSPITAL ENCOUNTER (OUTPATIENT)
Dept: LAB | Facility: MEDICAL CENTER | Age: 70
End: 2020-03-18
Attending: STUDENT IN AN ORGANIZED HEALTH CARE EDUCATION/TRAINING PROGRAM
Payer: MEDICARE

## 2020-03-18 LAB
CK SERPL-CCNC: 127 U/L (ref 0–154)
CREAT UR-MCNC: 88.38 MG/DL
ERYTHROCYTE [SEDIMENTATION RATE] IN BLOOD BY WESTERGREN METHOD: 5 MM/HOUR (ref 0–30)
MICROALBUMIN UR-MCNC: <1.2 MG/DL
MICROALBUMIN/CREAT UR: NORMAL MG/G (ref 0–30)

## 2020-03-18 PROCEDURE — 36415 COLL VENOUS BLD VENIPUNCTURE: CPT

## 2020-03-18 PROCEDURE — 82043 UR ALBUMIN QUANTITATIVE: CPT

## 2020-03-18 PROCEDURE — 82570 ASSAY OF URINE CREATININE: CPT

## 2020-03-18 PROCEDURE — 82550 ASSAY OF CK (CPK): CPT

## 2020-03-18 PROCEDURE — 85652 RBC SED RATE AUTOMATED: CPT

## 2020-03-18 PROCEDURE — 83036 HEMOGLOBIN GLYCOSYLATED A1C: CPT | Mod: GA

## 2020-03-19 LAB
EST. AVERAGE GLUCOSE BLD GHB EST-MCNC: 140 MG/DL
HBA1C MFR BLD: 6.5 % (ref 0–5.6)

## 2020-08-27 ENCOUNTER — HOSPITAL ENCOUNTER (OUTPATIENT)
Dept: LAB | Facility: MEDICAL CENTER | Age: 70
End: 2020-08-27
Attending: STUDENT IN AN ORGANIZED HEALTH CARE EDUCATION/TRAINING PROGRAM
Payer: MEDICARE

## 2020-08-27 LAB
ALBUMIN SERPL BCP-MCNC: 4.4 G/DL (ref 3.2–4.9)
ALBUMIN/GLOB SERPL: 1.3 G/DL
ALP SERPL-CCNC: 57 U/L (ref 30–99)
ALT SERPL-CCNC: 24 U/L (ref 2–50)
ANION GAP SERPL CALC-SCNC: 12 MMOL/L (ref 7–16)
ANION GAP SERPL CALC-SCNC: 13 MMOL/L (ref 7–16)
AST SERPL-CCNC: 17 U/L (ref 12–45)
BASOPHILS # BLD AUTO: 1.6 % (ref 0–1.8)
BASOPHILS # BLD: 0.11 K/UL (ref 0–0.12)
BILIRUB SERPL-MCNC: 0.3 MG/DL (ref 0.1–1.5)
BUN SERPL-MCNC: 14 MG/DL (ref 8–22)
BUN SERPL-MCNC: 14 MG/DL (ref 8–22)
CALCIUM SERPL-MCNC: 10 MG/DL (ref 8.5–10.5)
CALCIUM SERPL-MCNC: 10 MG/DL (ref 8.5–10.5)
CHLORIDE SERPL-SCNC: 95 MMOL/L (ref 96–112)
CHLORIDE SERPL-SCNC: 98 MMOL/L (ref 96–112)
CHOLEST SERPL-MCNC: 189 MG/DL (ref 100–199)
CHOLEST SERPL-MCNC: 190 MG/DL (ref 100–199)
CO2 SERPL-SCNC: 27 MMOL/L (ref 20–33)
CO2 SERPL-SCNC: 27 MMOL/L (ref 20–33)
CREAT SERPL-MCNC: 0.83 MG/DL (ref 0.5–1.4)
CREAT SERPL-MCNC: 0.85 MG/DL (ref 0.5–1.4)
EOSINOPHIL # BLD AUTO: 0.25 K/UL (ref 0–0.51)
EOSINOPHIL NFR BLD: 3.7 % (ref 0–6.9)
ERYTHROCYTE [DISTWIDTH] IN BLOOD BY AUTOMATED COUNT: 39.1 FL (ref 35.9–50)
FASTING STATUS PATIENT QL REPORTED: NORMAL
FASTING STATUS PATIENT QL REPORTED: NORMAL
GLOBULIN SER CALC-MCNC: 3.3 G/DL (ref 1.9–3.5)
GLUCOSE SERPL-MCNC: 134 MG/DL (ref 65–99)
GLUCOSE SERPL-MCNC: 135 MG/DL (ref 65–99)
HCT VFR BLD AUTO: 41.2 % (ref 37–47)
HDLC SERPL-MCNC: 33 MG/DL
HDLC SERPL-MCNC: 33 MG/DL
HGB BLD-MCNC: 14.4 G/DL (ref 12–16)
IMM GRANULOCYTES # BLD AUTO: 0.03 K/UL (ref 0–0.11)
IMM GRANULOCYTES NFR BLD AUTO: 0.4 % (ref 0–0.9)
LDLC SERPL CALC-MCNC: ABNORMAL MG/DL
LDLC SERPL CALC-MCNC: ABNORMAL MG/DL
LYMPHOCYTES # BLD AUTO: 3.02 K/UL (ref 1–4.8)
LYMPHOCYTES NFR BLD: 44.2 % (ref 22–41)
MCH RBC QN AUTO: 30.4 PG (ref 27–33)
MCHC RBC AUTO-ENTMCNC: 35 G/DL (ref 33.6–35)
MCV RBC AUTO: 87.1 FL (ref 81.4–97.8)
MONOCYTES # BLD AUTO: 0.6 K/UL (ref 0–0.85)
MONOCYTES NFR BLD AUTO: 8.8 % (ref 0–13.4)
NEUTROPHILS # BLD AUTO: 2.83 K/UL (ref 2–7.15)
NEUTROPHILS NFR BLD: 41.3 % (ref 44–72)
NRBC # BLD AUTO: 0 K/UL
NRBC BLD-RTO: 0 /100 WBC
PLATELET # BLD AUTO: 449 K/UL (ref 164–446)
PMV BLD AUTO: 9.8 FL (ref 9–12.9)
POTASSIUM SERPL-SCNC: 4.1 MMOL/L (ref 3.6–5.5)
POTASSIUM SERPL-SCNC: 4.1 MMOL/L (ref 3.6–5.5)
PROT SERPL-MCNC: 7.7 G/DL (ref 6–8.2)
RBC # BLD AUTO: 4.73 M/UL (ref 4.2–5.4)
SODIUM SERPL-SCNC: 134 MMOL/L (ref 135–145)
SODIUM SERPL-SCNC: 138 MMOL/L (ref 135–145)
TRIGL SERPL-MCNC: 427 MG/DL (ref 0–149)
TRIGL SERPL-MCNC: 427 MG/DL (ref 0–149)
TSH SERPL DL<=0.005 MIU/L-ACNC: 1.04 UIU/ML (ref 0.38–5.33)
WBC # BLD AUTO: 6.8 K/UL (ref 4.8–10.8)

## 2020-08-27 PROCEDURE — 80053 COMPREHEN METABOLIC PANEL: CPT

## 2020-08-27 PROCEDURE — 80061 LIPID PANEL: CPT | Mod: 91

## 2020-08-27 PROCEDURE — 80061 LIPID PANEL: CPT

## 2020-08-27 PROCEDURE — 80048 BASIC METABOLIC PNL TOTAL CA: CPT

## 2020-08-27 PROCEDURE — 36415 COLL VENOUS BLD VENIPUNCTURE: CPT

## 2020-08-27 PROCEDURE — 84443 ASSAY THYROID STIM HORMONE: CPT

## 2020-08-27 PROCEDURE — 85025 COMPLETE CBC W/AUTO DIFF WBC: CPT

## 2021-01-15 DIAGNOSIS — Z23 NEED FOR VACCINATION: ICD-10-CM

## 2022-01-21 ENCOUNTER — OFFICE VISIT (OUTPATIENT)
Dept: INTERNAL MEDICINE | Facility: OTHER | Age: 72
End: 2022-01-21
Payer: MEDICARE

## 2022-01-21 VITALS
TEMPERATURE: 98.2 F | BODY MASS INDEX: 28.94 KG/M2 | DIASTOLIC BLOOD PRESSURE: 87 MMHG | HEIGHT: 60 IN | OXYGEN SATURATION: 96 % | HEART RATE: 77 BPM | SYSTOLIC BLOOD PRESSURE: 160 MMHG | WEIGHT: 147.4 LBS

## 2022-01-21 DIAGNOSIS — E63.9 NUTRITIONAL DEFICIENCY: ICD-10-CM

## 2022-01-21 DIAGNOSIS — Z12.11 SCREENING FOR COLON CANCER: ICD-10-CM

## 2022-01-21 DIAGNOSIS — M17.0 PRIMARY OSTEOARTHRITIS OF BOTH KNEES: ICD-10-CM

## 2022-01-21 DIAGNOSIS — I10 ESSENTIAL HYPERTENSION: ICD-10-CM

## 2022-01-21 DIAGNOSIS — E11.42 TYPE 2 DIABETES MELLITUS WITH DIABETIC POLYNEUROPATHY, WITHOUT LONG-TERM CURRENT USE OF INSULIN (HCC): ICD-10-CM

## 2022-01-21 DIAGNOSIS — E04.2 MULTIPLE THYROID NODULES: ICD-10-CM

## 2022-01-21 DIAGNOSIS — E03.9 HYPOTHYROIDISM, UNSPECIFIED TYPE: ICD-10-CM

## 2022-01-21 DIAGNOSIS — D70.8 OTHER NEUTROPENIA (HCC): ICD-10-CM

## 2022-01-21 PROBLEM — M75.100 ROTATOR CUFF TEAR: Status: ACTIVE | Noted: 2021-05-20

## 2022-01-21 PROBLEM — G43.109 MIGRAINE WITH AURA: Status: RESOLVED | Noted: 2017-05-02 | Resolved: 2022-01-21

## 2022-01-21 PROBLEM — R49.0 HOARSENESS OF VOICE: Status: RESOLVED | Noted: 2019-01-04 | Resolved: 2022-01-21

## 2022-01-21 PROBLEM — M75.20 BICEPS TENDINITIS: Status: ACTIVE | Noted: 2021-06-01

## 2022-01-21 PROBLEM — M25.811 MASS OF JOINT OF RIGHT SHOULDER: Status: RESOLVED | Noted: 2019-04-08 | Resolved: 2022-01-21

## 2022-01-21 PROBLEM — S43.431A SUPERIOR GLENOID LABRUM LESION OF RIGHT SHOULDER: Status: ACTIVE | Noted: 2021-05-20

## 2022-01-21 PROBLEM — T39.95XA ANALGESIC REBOUND HEADACHE: Status: RESOLVED | Noted: 2019-01-28 | Resolved: 2022-01-21

## 2022-01-21 PROBLEM — G44.40 ANALGESIC REBOUND HEADACHE: Status: RESOLVED | Noted: 2019-01-28 | Resolved: 2022-01-21

## 2022-01-21 PROBLEM — B00.1 HERPES LABIALIS: Status: ACTIVE | Noted: 2021-03-10

## 2022-01-21 PROBLEM — M75.120 FULL THICKNESS ROTATOR CUFF TEAR: Status: ACTIVE | Noted: 2021-06-01

## 2022-01-21 PROBLEM — K21.9 GERD (GASTROESOPHAGEAL REFLUX DISEASE): Status: RESOLVED | Noted: 2017-05-02 | Resolved: 2022-01-21

## 2022-01-21 PROBLEM — M75.20 BICEPS TENDINITIS: Status: RESOLVED | Noted: 2021-06-01 | Resolved: 2022-01-21

## 2022-01-21 PROBLEM — F51.01 PRIMARY INSOMNIA: Status: RESOLVED | Noted: 2017-05-02 | Resolved: 2022-01-21

## 2022-01-21 PROBLEM — J31.0 CHRONIC RHINITIS: Status: ACTIVE | Noted: 2020-01-14

## 2022-01-21 PROBLEM — R05.3 CHRONIC COUGH: Status: RESOLVED | Noted: 2018-08-06 | Resolved: 2022-01-21

## 2022-01-21 PROBLEM — L98.9 SKIN LESION OF FACE: Status: RESOLVED | Noted: 2019-04-08 | Resolved: 2022-01-21

## 2022-01-21 PROCEDURE — 99213 OFFICE O/P EST LOW 20 MIN: CPT | Mod: GE | Performed by: STUDENT IN AN ORGANIZED HEALTH CARE EDUCATION/TRAINING PROGRAM

## 2022-01-21 RX ORDER — LOSARTAN POTASSIUM AND HYDROCHLOROTHIAZIDE 12.5; 5 MG/1; MG/1
1 TABLET ORAL DAILY
Qty: 90 TABLET | Refills: 3 | Status: SHIPPED | OUTPATIENT
Start: 2022-01-21 | End: 2022-11-02 | Stop reason: SDUPTHER

## 2022-01-21 RX ORDER — LEVOTHYROXINE SODIUM 0.05 MG/1
50 TABLET ORAL
Qty: 90 TABLET | Refills: 3 | Status: SHIPPED | OUTPATIENT
Start: 2022-01-21 | End: 2023-03-10 | Stop reason: SDUPTHER

## 2022-01-21 ASSESSMENT — FIBROSIS 4 INDEX: FIB4 SCORE: 0.56

## 2022-01-21 ASSESSMENT — PATIENT HEALTH QUESTIONNAIRE - PHQ9: CLINICAL INTERPRETATION OF PHQ2 SCORE: 0

## 2022-01-21 NOTE — PROGRESS NOTES
Established Patient    Patient Care Team:  Zo Maya D.O. as PCP - General (Internal Medicine)    HPI:  Tonya Estes is a 71 y.o. female who presents today with the following Chief Complaint(s): Follow up for Diagnoses of Essential hypertension, Type 2 diabetes mellitus with diabetic polyneuropathy, without long-term current use of insulin (HCC), Primary osteoarthritis of both knees, Multiple thyroid nodules, Hypothyroidism, unspecified type, Nutritional deficiency, Screening for colon cancer, and Other neutropenia (HCC) were pertinent to this visit.      She is here today as a follow up - last visit with me: May 2021.   Since then she has been seen by Dr. Alexander at [facility] for her SLAP lesion and the synovial cyst that resulted by her complete rotator cuff tear. The cyst has decreased in size since last visit. She received corticosteroid injections which helped with the pain.     Imaging was done at [facility]:  MRI of the Shoulder 5/12/2021    3/16/21 XR Shoulder   WE reviewed her medications - she never started Famotidine and discontinued Singulair. Have agreed to not refill these unless she has any sympptoms     She had no complaints    States she had an OPHTHO appointment two montsh ago - no retinopathy  Due for labs  Deferred Mammogram   UTD on vaccinations    She does have arthritis - moderate to bilateral knees. Discussed topical analgesics as oral anti-inflammatory medication can raise her blood pressure.     ROS:     Denies any new chest pain or shortness of breath.  No changes to urinary or bowel function.  See HPI.  ROS      Past Medical History:   Diagnosis Date   • Asthma    • Diabetes    • Heart burn    • Hypertension    • Hypothyroidism    • Unspecified disorder of thyroid      Social History     Tobacco Use   • Smoking status: Never Smoker   • Smokeless tobacco: Never Used   Substance Use Topics   • Alcohol use: No     Alcohol/week: 0.0 oz   • Drug  use: No     Current Outpatient Medications   Medication Sig Dispense Refill   • levothyroxine (SYNTHROID) 50 MCG Tab Take 1 Tablet by mouth every morning on an empty stomach. 90 Tablet 3   • losartan-hydrochlorothiazide (HYZAAR) 50-12.5 MG per tablet Take 1 Tablet by mouth every day. 90 Tablet 3   • multivitamin (THERAGRAN) Tab Take 1 Tablet by mouth every day. 90 Tablet 3   • diclofenac sodium (VOLTAREN) 1 % Gel Apply 2 g topically 4 times a day as needed. 150 g 2   • metFORMIN (GLUCOPHAGE) 500 MG Tab Take 1 Tablet by mouth every day. 90 Tablet 3   • fluticasone (FLONASE) 50 MCG/ACT nasal spray USE 1 SPRAY NASALLY EVERY DAY AS NEEDED FOR ITCHY, RUNNY NOSE. 16 g 6     No current facility-administered medications for this visit.       Physical Exam:  /87 (BP Location: Left arm, Patient Position: Sitting, BP Cuff Size: Adult)   Pulse 77   Temp 36.8 °C (98.2 °F) (Temporal)   Ht 1.524 m (5')   Wt 66.9 kg (147 lb 6.4 oz)   SpO2 96%   BMI 28.79 kg/m²   General: Well developed, well nourished female, in no distress.  Eyes: Conjuntiva without any obvious injection or erythema.   Cardiovascular: Heart is regular with no murmur  Lungs: Clear to auscultation bilaterally. No wheezes, rhonchi or crackles heard. Respiratory effort is normal.  Abd: Soft, non-tender  Ext: No edema  Physical Exam     Assessment and Plan:   Diagnoses and all orders for this visit:  Essential hypertension  -     losartan-hydrochlorothiazide (HYZAAR) 50-12.5 MG per tablet; Take 1 Tablet by mouth every day.  Type 2 diabetes mellitus with diabetic polyneuropathy, without long-term current use of insulin (HCC)  -     metFORMIN (GLUCOPHAGE) 500 MG Tab; Take 1 Tablet by mouth every day.  -     Diabetic Monofilament Lower Extremity Exam  -     Comp Metabolic Panel; Future  -     Hemoglobin A1c; Future  -     Microalbumin Creat Ratio Urine - Lab Collect; Future  -     Referral to Ophthalmology  Primary osteoarthritis of both knees  -      diclofenac sodium (VOLTAREN) 1 % Gel; Apply 2 g topically 4 times a day as needed.  Multiple thyroid nodules  Hypothyroidism, unspecified type  -     levothyroxine (SYNTHROID) 50 MCG Tab; Take 1 Tablet by mouth every morning on an empty stomach.  Nutritional deficiency  -     multivitamin (THERAGRAN) Tab; Take 1 Tablet by mouth every day.  Screening for colon cancer  -     Referral to GI for Colonoscopy  Other neutropenia (HCC)  -     CBC WITH DIFFERENTIAL; Future    Repeat labs  Refill medications   Advised to return to Orthopedics to discuss arthritis if no improvement with Voltaren gel         FATIMAH ChoO PGY III  [employer] [employer]

## 2022-02-12 NOTE — TELEPHONE ENCOUNTER
Last seen: 01/21/22 by Dr. Maya  Next appt: 06/10/22 with Dr. Maya     Was the patient seen in the last year in this department? Yes   Does patient have an active prescription for medications requested? No   Received Request Via: Pharmacy

## 2022-02-14 RX ORDER — BUDESONIDE AND FORMOTEROL FUMARATE DIHYDRATE 160; 4.5 UG/1; UG/1
AEROSOL RESPIRATORY (INHALATION)
Qty: 3 EACH | OUTPATIENT
Start: 2022-02-14

## 2022-03-07 DIAGNOSIS — M17.0 PRIMARY OSTEOARTHRITIS OF BOTH KNEES: ICD-10-CM

## 2022-03-08 NOTE — TELEPHONE ENCOUNTER
Diclofenac Sodium Gel    Last seen: 1/21/22 by Dr. Maya  Next appt: 6/10/22 with Dr. Maya    Was the patient seen in the last year in this department? Yes   Does patient have an active prescription for medications requested? No   Received Request Via: Pharmacy

## 2022-03-09 NOTE — PROGRESS NOTES
Established Patient    Tonya presents today with the following:    CC: Mrs. Estes presents today for medication refill and vaccinations.    HPI:     #HTN  -needs refill of Hyzaar    # Mild persistent asthma  -cannot get appt for methacholine challenge until 2019  -last visit was started on albuterol and symbicort and reports much improvement in her sx  -reports that currently, no longer having several nights per week wheezing or daily cough    # Healthcare maintenance  -chart notes due for HBV, but she reports having 3/3 as well as the shingles vaccine and Tdap (last year)  -she has had total hysterectomy so does not require pap smear  -she has already had a retinal screen this year per pt  -due for flu, PPSV23    # Mild myalgias  -reports mild myalgias of arms, resolves with tylenol  -ROM of both arms preserved   -myalgias are improved since stopping statin (currently on gemfibrozil instead). Last CPK had come down to normal.     Patient Active Problem List    Diagnosis Date Noted   • Chronic cough 08/06/2018   • Myalgia due to statin 05/30/2018   • Osteopenia 05/30/2018   • Melena 07/21/2017   • Type 2 diabetes mellitus (HCC) 05/02/2017   • Essential hypertension 05/02/2017   • Tension headache 05/02/2017   • Dyslipidemia 05/02/2017   • GERD (gastroesophageal reflux disease) 05/02/2017   • Primary insomnia 05/02/2017   • Hypothyroidism 05/02/2017   • Retinal detachment 02/12/2013       Current Outpatient Prescriptions   Medication Sig Dispense Refill   • losartan-hydrochlorothiazide (HYZAAR) 50-12.5 MG per tablet TAKE 1 TABLET EVERY DAY 90 Tab 3   • FLUoxetine (PROZAC) 10 MG Cap TAKE 1 CAPSULE EVERY DAY 90 Cap 3   • albuterol 108 (90 Base) MCG/ACT Aero Soln inhalation aerosol Inhale 2 Puffs by mouth every 6 hours as needed for Shortness of Breath. 8.5 g 1   • budesonide-formoterol (SYMBICORT) 80-4.5 MCG/ACT Aerosol Inhale 2 Puffs by mouth 2 Times a Day. 1 Inhaler 1   • fluticasone (FLONASE) 50 MCG/ACT  Patient made aware of 24/7 emergency services. nasal spray Spray 1 Spray in nose every day. As needed for itchy, runny nose. 16 g 1   • acetaminophen (TYLENOL) 500 MG Tab Take 1-2 Tabs by mouth every 6 hours as needed. No more than 3 grams per day. 30 Tab 0   • raNITidine (ZANTAC) 150 MG Tab TAKE 1 TABLET TWICE DAILY 180 Tab 3   • gemfibrozil (LOPID) 600 MG Tab Take 1 Tab by mouth 2 times a day. 60 Tab 7   • omeprazole (PRILOSEC) 40 MG delayed-release capsule Take 1 Cap by mouth every day. 90 Cap 3   • levothyroxine (SYNTHROID) 25 MCG Tab TAKE 1 TABLET EVERY DAY 60 Tab 6   • polyvinyl alcohol-povidone (REFRESH) 1.4-0.6 % ophthalmic solution Place 1 Drop in both eyes as needed. 1 Each 3   • metformin (GLUCOPHAGE) 1000 MG tablet TAKE 1/2 TABLET TWICE DAILY 90 Tab 3   • propranolol (INDERAL) 40 MG Tab TAKE 1 TABLET TWICE DAILY 180 Tab 2   • gabapentin (NEURONTIN) 100 MG Cap Take 2 Caps by mouth 3 times a day. 120 Cap 3   • Blood Glucose Monitoring Suppl Device Meter: Freestyle glucometer. Sig. Use as directed for blood sugar monitoring twice daily for insulin resistance. 1 Device 0   • Blood Glucose Monitoring Suppl SUPPLIES Misc Test strips order: Test strips for Abbott Freestyle Lite meter. Sig: use bid and prn ssx high or low sugar 200 Strip 11   • Blood Glucose Monitoring Suppl SUPPLIES Misc Lancets order: Lancets  strips for One Touch Ultra 2 meter. Sig: check blood sugars twice daily (Patient taking differently: Lancets order for once a day testing for diabetes E11.9) 200 Each 2   • Blood Glucose Monitoring Suppl SUPPLIES Misc Lancets order: Lancets for Abbott Freestyle Lite meter. Sig: use bid and prn ssx high or low sugar 200 Each 11   • multivitamin (THERAGRAN) TABS Take 1 Tab by mouth every day.       • sumatriptan (IMITREX) 100 MG tablet Take 1 Tab by mouth Once PRN for Migraine for up to 1 dose. 9 Tab 3   • tobramycin-dexamethasone (TOBRADEX) 0.3-0.1 % Suspension PLACE 1 DROP INTO BOTH EYES 4 TIMES A DAY  4   • acetaminophen (TYLENOL) 500 MG Tab Take  "500-1,000 mg by mouth every 6 hours as needed.     • Bismuth Subsalicylate (PEPTO-BISMOL PO) Take  by mouth.     • hydrocortisone 1 % Cream Please apply 5 times per day to the ulcer in your mouth for 3-5 days or until reduction of symptoms. 1 Tube 0     No current facility-administered medications for this visit.        ROS: As per HPI. Additional pertinent symptoms as noted below.    Review of Systems   Constitutional: Negative for chills and fever.   Eyes: Positive for blurred vision. Negative for double vision.   Respiratory: Negative for cough, shortness of breath and wheezing.    Cardiovascular: Negative for chest pain and palpitations.   Gastrointestinal: Negative for abdominal pain, diarrhea, nausea and vomiting.   Musculoskeletal: Positive for myalgias. Negative for joint pain.        Chronic intermittent myalgias she notes in her arms.    Skin: Negative for itching and rash.         /70   Pulse 66   Temp 36.6 °C (97.9 °F)   Ht 1.54 m (5' 0.63\")   Wt 68.5 kg (151 lb)   SpO2 95%   Breastfeeding? No   BMI 28.88 kg/m²     Physical Exam   Constitutional: She is oriented to person, place, and time. She appears well-developed and well-nourished. No distress.   HENT:   Head: Normocephalic and atraumatic.   Eyes: Conjunctivae are normal. Right eye exhibits no discharge. Left eye exhibits no discharge. No scleral icterus.   Cardiovascular: Normal rate, regular rhythm and normal heart sounds.  Exam reveals no gallop and no friction rub.    No murmur heard.  Pulmonary/Chest: Effort normal and breath sounds normal. No respiratory distress. She has no wheezes. She has no rales.   Abdominal: Soft. Bowel sounds are normal. She exhibits no distension. There is no tenderness. There is no rebound and no guarding.   Musculoskeletal: She exhibits tenderness. She exhibits no edema or deformity.   Mild tenderness over both upper arms. She has preserved passive and active ROM of both arms.   Neurological: She is alert " and oriented to person, place, and time.   Skin: Skin is warm and dry. No rash noted. She is not diaphoretic. No erythema. No pallor.   Psychiatric: She has a normal mood and affect. Her behavior is normal. Judgment and thought content normal.       Note: I have reviewed all pertinent labs and diagnostic tests associated with this visit with specific comments listed under the assessment and plan below    Assessment and Plan    Encounter Diagnoses   Name Primary?   • Essential hypertension    • Need for vaccination    • Mild persistent asthma, unspecified whether complicated    • Myalgia      # Mild persistent asthma:  -presumed dx for now based on sx given last PFTs normal and cannot get methacholine challenge for confirmation until 2019  -continuing the albuterol and symbicort as they are doing well for her    # HTN  -refilled the hyzaar    # Need for vaccination  -ordered PPSV23 and flu vaccines    # Myalgias  -of b/l arms, mild, resolves with tylenol, and better than previously when on statin. Last CPK wnl. ROM preserved.   -advised her to return to clinic if any worsening.     Followup: Return in about 15 weeks (around 12/24/2018).      Signed by: Adriana Almaraz M.D.

## 2022-03-30 DIAGNOSIS — E63.9 NUTRITIONAL DEFICIENCY: ICD-10-CM

## 2022-03-30 NOTE — TELEPHONE ENCOUNTER
Multivitamin Refill    Last seen: 1/21/22 by Dr. Maya  Next appt: 6/10/22 with Dr. Maya    Was the patient seen in the last year in this department? Yes   Does patient have an active prescription for medications requested? No   Received Request Via: Pharmacy

## 2022-06-10 ENCOUNTER — OFFICE VISIT (OUTPATIENT)
Dept: INTERNAL MEDICINE | Facility: OTHER | Age: 72
End: 2022-06-10
Payer: MEDICARE

## 2022-06-10 VITALS
HEART RATE: 80 BPM | HEIGHT: 60 IN | OXYGEN SATURATION: 92 % | BODY MASS INDEX: 28.78 KG/M2 | WEIGHT: 146.6 LBS | TEMPERATURE: 98.2 F | DIASTOLIC BLOOD PRESSURE: 71 MMHG | SYSTOLIC BLOOD PRESSURE: 145 MMHG

## 2022-06-10 DIAGNOSIS — R42 LIGHTHEADED: ICD-10-CM

## 2022-06-10 DIAGNOSIS — K21.00 GASTROESOPHAGEAL REFLUX DISEASE WITH ESOPHAGITIS, UNSPECIFIED WHETHER HEMORRHAGE: ICD-10-CM

## 2022-06-10 DIAGNOSIS — S46.011S TRAUMATIC COMPLETE TEAR OF RIGHT ROTATOR CUFF, SEQUELA: ICD-10-CM

## 2022-06-10 DIAGNOSIS — E78.5 DYSLIPIDEMIA: ICD-10-CM

## 2022-06-10 DIAGNOSIS — E03.9 HYPOTHYROIDISM, UNSPECIFIED TYPE: ICD-10-CM

## 2022-06-10 DIAGNOSIS — Z11.59 NEED FOR HEPATITIS C SCREENING TEST: ICD-10-CM

## 2022-06-10 DIAGNOSIS — E11.42 TYPE 2 DIABETES MELLITUS WITH DIABETIC POLYNEUROPATHY, WITHOUT LONG-TERM CURRENT USE OF INSULIN (HCC): ICD-10-CM

## 2022-06-10 PROBLEM — B00.1 HERPES LABIALIS: Status: RESOLVED | Noted: 2021-03-10 | Resolved: 2022-06-10

## 2022-06-10 PROBLEM — Z12.11 SCREENING FOR COLON CANCER: Status: RESOLVED | Noted: 2022-01-21 | Resolved: 2022-06-10

## 2022-06-10 PROBLEM — E63.9 NUTRITIONAL DEFICIENCY: Status: RESOLVED | Noted: 2021-05-20 | Resolved: 2022-06-10

## 2022-06-10 LAB
HBA1C MFR BLD: 7.3 % (ref 0–5.6)
INT CON NEG: NEGATIVE
INT CON POS: POSITIVE

## 2022-06-10 PROCEDURE — 99214 OFFICE O/P EST MOD 30 MIN: CPT | Mod: GC | Performed by: STUDENT IN AN ORGANIZED HEALTH CARE EDUCATION/TRAINING PROGRAM

## 2022-06-10 PROCEDURE — 83036 HEMOGLOBIN GLYCOSYLATED A1C: CPT | Performed by: STUDENT IN AN ORGANIZED HEALTH CARE EDUCATION/TRAINING PROGRAM

## 2022-06-10 RX ORDER — CYCLOSPORINE 0.5 MG/ML
EMULSION OPHTHALMIC
COMMUNITY
Start: 2022-03-25 | End: 2023-12-11

## 2022-06-10 RX ORDER — OMEPRAZOLE 40 MG/1
40 CAPSULE, DELAYED RELEASE ORAL DAILY
Qty: 30 CAPSULE | Refills: 3 | Status: SHIPPED | OUTPATIENT
Start: 2022-06-10 | End: 2022-08-16

## 2022-06-10 ASSESSMENT — ENCOUNTER SYMPTOMS
PALPITATIONS: 0
SHORTNESS OF BREATH: 0
WEAKNESS: 0
DIARRHEA: 0
VOMITING: 0
MYALGIAS: 0
COUGH: 0
DIZZINESS: 0
CHILLS: 0
CONSTIPATION: 0
FEVER: 0
DEPRESSION: 0
WEIGHT LOSS: 0
NAUSEA: 0

## 2022-06-10 ASSESSMENT — FIBROSIS 4 INDEX: FIB4 SCORE: 0.57

## 2022-06-10 NOTE — PROGRESS NOTES
Chief Complaint:  Re-establish care    Last Seen:   Jan 2022    History of Present Illness:   Pt is a 69-year-old F with PMHx of HTN, NIDDM (aug 2020 6.5 a1c) c/b peripheral neuropathy, dyslipidemia, hypertriglyceridemia, hypothyroidism (last TSH 1.04 8/27/2020), multiple thyroid nodules, GERD, osteopenia, osteoarthritis, h/o complete rotator cuff tear presenting today to re-establish care.     May with vomiting + nausea + abd pain - resolved. Now with abd pain after eating. Waits between dinner and sleep. No greasy foods/ acid foods other than tomatoes every other day. Takes ibuprofen daily.    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     Headache: bilateral frontal only coincides with dizzy/ lightheaded - worse with standing up    Orthopedic: referral needed    Chronic cough at night: much better     Healthcare  Screening Pending:   -Hep C   -DTaP, COVID   -Mammography   -Ophthalmology   -Podiatry  -DEXA  -Diabetic monofilament exam    Review of Systems   Review of Systems   Constitutional: Negative for chills, fever and weight loss.   Respiratory: Negative for cough and shortness of breath.    Cardiovascular: Negative for chest pain, palpitations and leg swelling.   Gastrointestinal: Negative for constipation, diarrhea, nausea and vomiting.   Genitourinary: Negative for dysuria.   Musculoskeletal: Negative for myalgias.   Neurological: Negative for dizziness and weakness.   Psychiatric/Behavioral: Negative for depression.        Past Medical History:   Past Medical History:   Diagnosis Date   • Asthma    • Diabetes    • Heart burn    • Hypertension    • Hypothyroidism    • Unspecified disorder of thyroid        Patient Active Problem List    Diagnosis Date Noted   • Screening for colon cancer 01/21/2022   • Primary osteoarthritis of both knees 01/21/2022   • Full thickness rotator cuff tear 06/01/2021   • Superior glenoid labrum lesion of right shoulder 05/20/2021   • Herpes labialis 03/10/2021   •  Chronic rhinitis 01/14/2020   • Multiple thyroid nodules 04/08/2019   • Mild persistent asthma without complication 01/04/2019   • Osteopenia 05/30/2018   • Type 2 diabetes mellitus (HCC) 05/02/2017   • Essential hypertension 05/02/2017   • Dyslipidemia 05/02/2017   • Hypothyroidism 05/02/2017       Past Surgical History:   Past Surgical History:   Procedure Laterality Date   • SCLERAL BUCKLING  2/12/2013    Performed by Shankar Estrada M.D. at [facility]   • CHOLECYSTECTOMY        Allergies:  Patient has no known allergies.    Medications:     Current Outpatient Medications:   •  Restasis, , Taking  •  multivitamin, TOME 1 TABLETA CADA WILSON, Taking  •  diclofenac sodium, APLIQUE 2 GRAMOS TOPICAMENTE 4 VECES AL WILSON DEVANTE SE NECESITE, Taking  •  levothyroxine, 50 mcg, Oral, AM ES, Taking  •  losartan-hydrochlorothiazide, 1 Tablet, Oral, DAILY, Taking  •  metFORMIN, 500 mg, Oral, DAILY, Taking  •  fluticasone, USE 1 SPRAY NASALLY EVERY DAY AS NEEDED FOR ITCHY, RUNNY NOSE., Taking     Social History:   Social History     Tobacco Use   • Smoking status: Never Smoker   • Smokeless tobacco: Never Used   Vaping Use   • Vaping Use: Never used   Substance Use Topics   • Alcohol use: No     Alcohol/week: 0.0 oz   • Drug use: No       Family History:   Family History   Problem Relation Age of Onset   • Prostate cancer Father    • Heart Attack Mother    • Diabetes Sister        Vitals:   /73 (BP Location: Right arm, Patient Position: Sitting, BP Cuff Size: Adult)   Pulse 77   Temp 36.8 °C (98.2 °F) (Temporal)   Ht 1.524 m (5')   Wt 66.5 kg (146 lb 9.6 oz)   SpO2 92%  Body mass index is 28.63 kg/m².    Physical Exam:   Physical Exam  Constitutional:       General: She is not in acute distress.     Appearance: Normal appearance. She is not ill-appearing or diaphoretic.   HENT:      Head: Normocephalic and atraumatic.      Mouth/Throat:      Mouth: Mucous membranes are moist.   Eyes:       Extraocular Movements: Extraocular movements intact.      Pupils: Pupils are equal, round, and reactive to light.   Cardiovascular:      Rate and Rhythm: Normal rate and regular rhythm.      Heart sounds: No murmur heard.    No friction rub. No gallop.   Pulmonary:      Effort: No respiratory distress.      Breath sounds: No stridor. No wheezing, rhonchi or rales.   Abdominal:      General: Bowel sounds are normal. There is no distension.      Tenderness: There is no abdominal tenderness. There is no guarding or rebound.   Musculoskeletal:      Right lower leg: No edema.      Left lower leg: No edema.   Skin:     Coloration: Skin is not jaundiced or pale.   Neurological:      General: No focal deficit present.      Mental Status: She is alert and oriented to person, place, and time.   Psychiatric:         Mood and Affect: Mood normal.         Behavior: Behavior normal.       Assessment and Plan    Pt is a 69-year-old F with PMHx of HTN, NIDDM (aug 2020 6.5 a1c) c/b peripheral neuropathy, dyslipidemia, hypertriglyceridemia, hypothyroidism (last TSH 1.04 8/27/2020), multiple thyroid nodules, GERD, osteopenia, osteoarthritis, h/o complete rotator cuff tear presenting today to re-establish care. Patient to follow up in 5-7 weeks with lab work.    #Gastroestophageal reflux disease  -Abd pain x 6 months, improved with ranitidine in past but she self-discontinued and then discomfort returned  -Significant ibuprofen use  **DDx: GERD vs PUD   PLAN  -Omeprazole 20 mg PO qD  -CBC to ensure no chronic anemia in se    #Dizzy  #Lightheadedness  #Headaches  -Sx began 6 mo ago and are consistently daily  -Orthostats negative   **DDx: dehydration vs ibuprofen rebound vs metabolic  PLAN  -Counseled on hydration  -CBC to check for slow bleed in setting of ibuprofen use/ anemia    #Diabetes mellitus, type 2, non-insulin dependent   -POC A1c 6/10/22: 7.3  -Monofilament exam 6/10/22: R > L loss of sensation at feet w/o any wounds    PLAN  -Metformin 500mg PO qD  -Microalbumin/Cr urine ratio  -Follows with ophthalmology  -Podiatry referral    -- CHRONIC PROBLEMS --     #Hypertriglyceridemia, moderate  #Dyslipidemia  -Lipid panel 8/27/2020: total cholesterol 190, , HDL 33, LDL undetectably high ASCVD 35%  -On Gemfibrozil/ ezetimibe in past- stopped it herself - does not like pills she states   PLAN   -Fasting lipid panel    #Hypothyroidism  PLAN  -Levothyroxine 50mcg PO qD  -TSH, free t4    #Hypertension   -In office /73  PLAN  -Losartan-hydrochlorothiazide 50-12.5mg PO qD    #Osteoarthritis, bilateral knees- diclofenac gel PRN     #Healthcare maintenance  #Osteopenia  -Last mammogram 2018: WNL   -Last DEXA 2018: osteopenia at lumbar spine and proximal L femur  PLAN  -Hep C screen   -DTaP- pt to obtain  -COVID- pt to obtain  -Mammography   -DEXA  -Ophthalmology- referral previously placed  -Diabetic monofilament exam    Please note that this dictation was created using voice recognition software. I have made every reasonable attempt to correct obvious errors, but I expect that there are errors of grammar and possibly content that I did not discover before finalizing the note.    Patient case was seen/ assessed/ discussed with Dr. Naranjo.    Ant Parson, PGY-1   Internal Medicine

## 2022-07-18 ENCOUNTER — OFFICE VISIT (OUTPATIENT)
Dept: INTERNAL MEDICINE | Facility: OTHER | Age: 72
End: 2022-07-18
Payer: MEDICARE

## 2022-07-18 VITALS
HEIGHT: 60 IN | OXYGEN SATURATION: 96 % | DIASTOLIC BLOOD PRESSURE: 67 MMHG | BODY MASS INDEX: 28.62 KG/M2 | HEART RATE: 76 BPM | TEMPERATURE: 98.6 F | WEIGHT: 145.8 LBS | SYSTOLIC BLOOD PRESSURE: 142 MMHG

## 2022-07-18 DIAGNOSIS — T46.6X5A ADVERSE EFFECT OF ATORVASTATIN: ICD-10-CM

## 2022-07-18 DIAGNOSIS — E78.1 HYPERTRIGLYCERIDEMIA: ICD-10-CM

## 2022-07-18 DIAGNOSIS — Z12.31 SCREENING MAMMOGRAM, ENCOUNTER FOR: ICD-10-CM

## 2022-07-18 DIAGNOSIS — E78.5 DYSLIPIDEMIA: ICD-10-CM

## 2022-07-18 PROCEDURE — 99214 OFFICE O/P EST MOD 30 MIN: CPT | Mod: GC | Performed by: STUDENT IN AN ORGANIZED HEALTH CARE EDUCATION/TRAINING PROGRAM

## 2022-07-18 RX ORDER — ATORVASTATIN CALCIUM 40 MG/1
40 TABLET, FILM COATED ORAL NIGHTLY
Qty: 30 TABLET | Refills: 3 | Status: SHIPPED | OUTPATIENT
Start: 2022-07-18 | End: 2022-09-25

## 2022-07-18 RX ORDER — OMEGA-3-ACID ETHYL ESTERS 1 G/1
4 CAPSULE, LIQUID FILLED ORAL DAILY
Qty: 30 CAPSULE | Refills: 3 | Status: SHIPPED | OUTPATIENT
Start: 2022-07-18 | End: 2023-01-03

## 2022-07-18 ASSESSMENT — ENCOUNTER SYMPTOMS
DIARRHEA: 0
WEIGHT LOSS: 0
NAUSEA: 0
VOMITING: 0
DEPRESSION: 0
DIZZINESS: 0
FEVER: 0
COUGH: 0
CHILLS: 0
PALPITATIONS: 0
SHORTNESS OF BREATH: 0
MYALGIAS: 0
CONSTIPATION: 0
WEAKNESS: 0

## 2022-07-18 ASSESSMENT — FIBROSIS 4 INDEX: FIB4 SCORE: 0.69

## 2022-07-18 NOTE — PROGRESS NOTES
Chief Complaint:  Follow up with labs    Last Seen:   6/10/22    History of Present Illness:   Pt is a 72-year-old F with PMHx of HTN, NIDDM (June 2022 a1c 7.4) c/b peripheral neuropathy,dyslipidmia, hypertriglyceridemia, hypothyroidism (last TSH 1.04 8/27/2020),  GERD, osteopenia, stroke x 3 (1986), osteoarthritis, h/o complete rotator cuff tear presenting today to follow up with labs.    Abd pain after eating: better symptoms; less tomatoes; ibuprofen cut out as well.     Headache/ lightheadedness: hydrated?    Hypertension: low 100s at home. States she is high this visit because she was cleaning her garden.     Orthopedic doc:      Optho/ podiatry: saw podiatry - foot fungus; scheduled sept for eye doc     Healthcare Screening Pending:   -DTaP, COVID x 3d   -Mammography ordered  -DEXA- order next visit    Review of Systems   Review of Systems   Constitutional: Negative for chills, fever and weight loss.   Respiratory: Negative for cough and shortness of breath.    Cardiovascular: Negative for chest pain, palpitations and leg swelling.   Gastrointestinal: Negative for constipation, diarrhea, nausea and vomiting.   Genitourinary: Negative for dysuria.   Musculoskeletal: Negative for myalgias.   Neurological: Negative for dizziness and weakness.   Psychiatric/Behavioral: Negative for depression.      Past Medical History:   Past Medical History:   Diagnosis Date   • Asthma    • Diabetes    • Heart burn    • Hypertension    • Hypothyroidism    • Unspecified disorder of thyroid      Patient Active Problem List    Diagnosis Date Noted   • Primary osteoarthritis of both knees 01/21/2022   • Full thickness rotator cuff tear 06/01/2021   • Superior glenoid labrum lesion of right shoulder 05/20/2021   • Chronic rhinitis 01/14/2020   • Multiple thyroid nodules 04/08/2019   • Mild persistent asthma without complication 01/04/2019   • Osteopenia 05/30/2018   • Type 2 diabetes mellitus (HCC) 05/02/2017   • Essential  hypertension 05/02/2017   • Dyslipidemia 05/02/2017   • Hypothyroidism 05/02/2017     Past Surgical History:   Past Surgical History:   Procedure Laterality Date   • SCLERAL BUCKLING  2/12/2013    Performed by Shankar Estrada M.D. at SURGERY SAME DAY [facility]   • CHOLECYSTECTOMY        Allergies:  Patient has no known allergies.    Medications:     Current Outpatient Medications:   •  Neomycin-Polymyxin-Dexameth, neomycin 3.5 mg/g-polymyxin B 10,000 unit/g-dexameth 0.1 % eye oint, Taking  •  atorvastatin, 40 mg, Oral, Nightly  •  Restasis, , Taking  •  omeprazole, 40 mg, Oral, DAILY, Taking  •  multivitamin, TOME 1 TABLETA CADA WILSON, Taking  •  diclofenac sodium, APLIQUE 2 GRAMOS TOPICAMENTE 4 VECES AL WILSON DEVANTE SE NECESITE, Taking  •  levothyroxine, 50 mcg, Oral, AM ES, Taking  •  losartan-hydrochlorothiazide, 1 Tablet, Oral, DAILY, Taking  •  metFORMIN, 500 mg, Oral, DAILY, Taking  •  fluticasone, USE 1 SPRAY NASALLY EVERY DAY AS NEEDED FOR ITCHY, RUNNY NOSE., Taking     Social History:   Social History     Tobacco Use   • Smoking status: Never Smoker   • Smokeless tobacco: Never Used   Vaping Use   • Vaping Use: Never used   Substance Use Topics   • Alcohol use: No     Alcohol/week: 0.0 oz   • Drug use: No     Family History:   Family History   Problem Relation Age of Onset   • Prostate cancer Father    • Heart Attack Mother    • Diabetes Sister      Vitals:   BP (!) 142/67 (BP Location: Left arm, Patient Position: Sitting, BP Cuff Size: Adult)   Pulse 76   Temp 37 °C (98.6 °F) (Temporal)   Ht 1.524 m (5')   Wt 66.1 kg (145 lb 12.8 oz)   SpO2 96%  Body mass index is 28.47 kg/m².    Physical Exam:   Physical Exam  Constitutional:       General: She is not in acute distress.     Appearance: Normal appearance. She is not ill-appearing or diaphoretic.   HENT:      Head: Normocephalic and atraumatic.      Mouth/Throat:      Mouth: Mucous membranes are moist.   Eyes:      Extraocular Movements:  Extraocular movements intact.      Pupils: Pupils are equal, round, and reactive to light.   Cardiovascular:      Rate and Rhythm: Normal rate and regular rhythm.      Heart sounds: No murmur heard.    No friction rub. No gallop.   Pulmonary:      Effort: No respiratory distress.      Breath sounds: No stridor. No wheezing, rhonchi or rales.   Abdominal:      General: Bowel sounds are normal. There is no distension.      Tenderness: There is no abdominal tenderness. There is no guarding or rebound.   Musculoskeletal:      Right lower leg: No edema.      Left lower leg: No edema.   Skin:     Coloration: Skin is not jaundiced or pale.   Neurological:      General: No focal deficit present.      Mental Status: She is alert and oriented to person, place, and time.   Psychiatric:         Mood and Affect: Mood normal.         Behavior: Behavior normal.     Assessment and Plan    Pt is a 69-year-old F with PMHx of HTN, NIDDM (aug 2020 6.5 a1c) c/b peripheral neuropathy, dyslipidemia, hypertriglyceridemia, hypothyroidism (last TSH 1.04 8/27/2020), multiple thyroid nodules, GERD, osteopenia, osteoarthritis, h/o complete rotator cuff tear presenting today to follow up. Patient to follow up in 6 months.     #Hypertriglyceridemia, moderate  #Dyslipidemia  -Lipid panel 7/15/22: total cholesterol 180, , HDL 31, VLDL 60, LDL 90; ASCVD 19.3%  -On Gemfibrozil/ ezetimibe in past- stopped it herself - does not like pills she states   PLAN   -atorvastatin 40mg PO qD + omega 3 fatty acid  -CMP + lipid panel 4-6 weeks after starting statin     #Gastroestophageal reflux disease  -Abd pain x 6 months, improved with ranitidine in past but she self-discontinued and then discomfort returned  -Significant ibuprofen use hx  **DDx: GERD vs PUD   PLAN  -Omeprazole 20 mg PO qD    #Dizzy  #Lightheadedness  #Headaches  -Sx began 6 mo ago and are consistently daily  -Orthostats negative   -TSH neg 7/14/22  **DDx: dehydration vs ibuprofen  rebound vs metabolic  PLAN  -Counseled on hydration  -CBC to check for slow bleed in setting of ibuprofen use/ anemia     #Diabetes mellitus, type 2, non-insulin dependent   -POC A1c 6/10/22: 7.3  -Monofilament exam 6/10/22: R > L loss of sensation at feet w/o any wounds   PLAN  -Metformin 500mg PO qD- room to inc if needed  -Microalbumin/Cr urine ratio  -Follows with ophthalmology  -Podiatry referral     -- CHRONIC PROBLEMS --      #Hypothyroidism  -TSH/ free t4 WNL July 2022  PLAN  -Levothyroxine 50mcg PO qD     #Hypertension   -In office -150s SBP 7/18/22  PLAN  -Losartan-hydrochlorothiazide 50-12.5mg PO qD  -BP log     #Osteoarthritis, bilateral knees- diclofenac gel PRN      #Healthcare maintenance  #Osteopenia  -Last mammogram 2018: WNL   -Last DEXA 2018: osteopenia at lumbar spine and proximal L femur  -COVID vaccine x2 doses + 1 booster   PLAN  -DTaP- pt to obtain  -Mammography- reordered  -DEXA- order next visit  -Ophthalmology- referral previously placed     Please note that this dictation was created using voice recognition software. I have made every reasonable attempt to correct obvious errors, but I expect that there are errors of grammar and possibly content that I did not discover before finalizing the note.     Patient case was seen/ assessed/ discussed with Dr. Naranjo.     Ant Parson, PGY-1   Internal Medicine

## 2022-08-16 DIAGNOSIS — K21.00 GASTROESOPHAGEAL REFLUX DISEASE WITH ESOPHAGITIS, UNSPECIFIED WHETHER HEMORRHAGE: ICD-10-CM

## 2022-08-16 RX ORDER — OMEPRAZOLE 40 MG/1
CAPSULE, DELAYED RELEASE ORAL
Qty: 90 CAPSULE | Refills: 3 | Status: SHIPPED | OUTPATIENT
Start: 2022-08-16 | End: 2023-12-11

## 2022-08-16 NOTE — TELEPHONE ENCOUNTER
Last seen: 07/18/2022 by Dr. Parson  Next appt: 01/03/2023 with Dr. Parson    Was the patient seen in the last year in this department? Yes   Does patient have an active prescription for medications requested? No   Received Request Via: Pharmacy

## 2022-09-22 DIAGNOSIS — E78.5 DYSLIPIDEMIA: ICD-10-CM

## 2022-09-22 NOTE — TELEPHONE ENCOUNTER
Atorvastatin Refill    Last seen: 7/18/22 by Dr. Parson  Next appt: 1/3/22 with Dr. Parson    Was the patient seen in the last year in this department? Yes   Does patient have an active prescription for medications requested? No   Received Request Via: Pharmacy

## 2022-09-25 RX ORDER — ATORVASTATIN CALCIUM 40 MG/1
TABLET, FILM COATED ORAL
Qty: 90 TABLET | Refills: 0 | Status: SHIPPED | OUTPATIENT
Start: 2022-09-25 | End: 2023-02-28

## 2022-10-07 ENCOUNTER — TELEPHONE (OUTPATIENT)
Dept: INTERNAL MEDICINE | Facility: OTHER | Age: 72
End: 2022-10-07
Payer: MEDICARE

## 2022-10-07 NOTE — TELEPHONE ENCOUNTER
Patient left a vm stating that [facility] never got the prescription for fungal infection. (Ketoconazole)

## 2022-10-10 DIAGNOSIS — B35.1 ONYCHOMYCOSIS: ICD-10-CM

## 2022-10-10 RX ORDER — TERBINAFINE HYDROCHLORIDE 250 MG/1
250 TABLET ORAL DAILY
Qty: 30 TABLET | Refills: 0 | Status: SHIPPED | OUTPATIENT
Start: 2022-10-10 | End: 2023-01-03

## 2022-11-02 DIAGNOSIS — I10 ESSENTIAL HYPERTENSION: ICD-10-CM

## 2022-11-02 RX ORDER — LOSARTAN POTASSIUM AND HYDROCHLOROTHIAZIDE 12.5; 5 MG/1; MG/1
1 TABLET ORAL DAILY
Qty: 90 TABLET | Refills: 3 | Status: SHIPPED | OUTPATIENT
Start: 2022-11-02 | End: 2023-12-11 | Stop reason: SDUPTHER

## 2022-11-02 NOTE — TELEPHONE ENCOUNTER
Last seen: 7/18/22 by Dr. Parson  Next appt: 1/3/2023 with Dr. Parson    Was the patient seen in the last year in this department? Yes   Does patient have an active prescription for medications requested? No   Received Request Via: Pharmacy

## 2023-01-03 ENCOUNTER — OFFICE VISIT (OUTPATIENT)
Dept: INTERNAL MEDICINE | Facility: OTHER | Age: 73
End: 2023-01-03
Payer: MEDICARE

## 2023-01-03 ENCOUNTER — HOSPITAL ENCOUNTER (OUTPATIENT)
Dept: RADIOLOGY | Facility: MEDICAL CENTER | Age: 73
End: 2023-01-03
Attending: STUDENT IN AN ORGANIZED HEALTH CARE EDUCATION/TRAINING PROGRAM

## 2023-01-03 VITALS
HEART RATE: 79 BPM | BODY MASS INDEX: 28.58 KG/M2 | WEIGHT: 145.6 LBS | HEIGHT: 60 IN | OXYGEN SATURATION: 95 % | TEMPERATURE: 96.4 F | SYSTOLIC BLOOD PRESSURE: 142 MMHG | DIASTOLIC BLOOD PRESSURE: 76 MMHG

## 2023-01-03 DIAGNOSIS — Z12.39 ENCOUNTER FOR SCREENING FOR MALIGNANT NEOPLASM OF BREAST, UNSPECIFIED SCREENING MODALITY: ICD-10-CM

## 2023-01-03 DIAGNOSIS — Z23 NEED FOR DIPHTHERIA-TETANUS-PERTUSSIS (TDAP) VACCINE: ICD-10-CM

## 2023-01-03 DIAGNOSIS — Z11.4 SCREENING FOR HIV (HUMAN IMMUNODEFICIENCY VIRUS): ICD-10-CM

## 2023-01-03 DIAGNOSIS — M80.08XA AGE-RELATED OSTEOPOROSIS WITH CURRENT PATHOLOGICAL FRACTURE, VERTEBRA(E), INITIAL ENCOUNTER FOR FRACTURE (HCC): ICD-10-CM

## 2023-01-03 DIAGNOSIS — Z12.11 ENCOUNTER FOR SCREENING COLONOSCOPY: ICD-10-CM

## 2023-01-03 DIAGNOSIS — M85.859 OSTEOPENIA OF HIP, UNSPECIFIED LATERALITY: ICD-10-CM

## 2023-01-03 DIAGNOSIS — T78.40XA ALLERGY, INITIAL ENCOUNTER: ICD-10-CM

## 2023-01-03 DIAGNOSIS — E11.42 TYPE 2 DIABETES MELLITUS WITH DIABETIC POLYNEUROPATHY, WITHOUT LONG-TERM CURRENT USE OF INSULIN (HCC): ICD-10-CM

## 2023-01-03 DIAGNOSIS — T78.40XS ALLERGY, SEQUELA: ICD-10-CM

## 2023-01-03 LAB
HBA1C MFR BLD: 8 % (ref 0–5.6)
INT CON NEG: NEGATIVE
INT CON POS: POSITIVE

## 2023-01-03 PROCEDURE — 99214 OFFICE O/P EST MOD 30 MIN: CPT | Mod: 25,GC | Performed by: STUDENT IN AN ORGANIZED HEALTH CARE EDUCATION/TRAINING PROGRAM

## 2023-01-03 PROCEDURE — 83036 HEMOGLOBIN GLYCOSYLATED A1C: CPT | Performed by: STUDENT IN AN ORGANIZED HEALTH CARE EDUCATION/TRAINING PROGRAM

## 2023-01-03 PROCEDURE — 90715 TDAP VACCINE 7 YRS/> IM: CPT | Performed by: STUDENT IN AN ORGANIZED HEALTH CARE EDUCATION/TRAINING PROGRAM

## 2023-01-03 PROCEDURE — 90471 IMMUNIZATION ADMIN: CPT | Performed by: STUDENT IN AN ORGANIZED HEALTH CARE EDUCATION/TRAINING PROGRAM

## 2023-01-03 RX ORDER — CETIRIZINE HYDROCHLORIDE 10 MG/1
10 TABLET ORAL DAILY
Qty: 90 TABLET | Refills: 1 | Status: SHIPPED | OUTPATIENT
Start: 2023-01-03 | End: 2023-06-07

## 2023-01-03 RX ORDER — METFORMIN HYDROCHLORIDE EXTENDED-RELEASE TABLETS 1000 MG/1
1000 TABLET, FILM COATED, EXTENDED RELEASE ORAL
Qty: 90 TABLET | Refills: 2 | Status: SHIPPED | OUTPATIENT
Start: 2023-01-03 | End: 2023-12-11

## 2023-01-03 ASSESSMENT — ENCOUNTER SYMPTOMS
CONSTIPATION: 0
DIARRHEA: 0
CHILLS: 0
MYALGIAS: 0
WEAKNESS: 0
VOMITING: 0
FEVER: 0
PALPITATIONS: 0
DIZZINESS: 0
DEPRESSION: 0
COUGH: 0
NAUSEA: 0
SHORTNESS OF BREATH: 0
WEIGHT LOSS: 0

## 2023-01-03 ASSESSMENT — PATIENT HEALTH QUESTIONNAIRE - PHQ9: CLINICAL INTERPRETATION OF PHQ2 SCORE: 0

## 2023-01-03 ASSESSMENT — FIBROSIS 4 INDEX: FIB4 SCORE: 0.69

## 2023-01-03 NOTE — PROGRESS NOTES
Chief Complaint:  Follow up, annual    Last Seen:   22    History of Present Illness:   Pt is a 72-year-old F with PMHx of HTN, NIDDM (2022 a1c 7.4) c/b peripheral neuropathy,dyslipidmia, hypertriglyceridemia, hypothyroidism (last TSH 1.04 2020),  GERD, osteopenia, stroke x 3 (), osteoarthritis, h/o complete rotator cuff tear presenting today for 6 month follow up.     Notes allergy sx has not tried OTC anti allergy meds.     Exercise: very active with gardening, walking  Diet: low sugar diet. Well balanced diet with vegetables and fruits.     ----    Healthcare maintenance  Mood screenin PHQ-2, CHANDNI 0   Drug screening: none   Sexual activity screening: not sexually active   Infectious disease screening: hep C screening negative, needs HIV screen with next set of labs  Colon cancer screenin years prior normal, FIT test ordered   Vaccines: Tdap (today), COVID/ shingles in local pharmacy  Osteoporosis screenin, repeat ordered  Breast cancer screenin last screening, normal; re-ordered  Cervical cancer screening: s/p total hysterectomy   Diabetes screening: a1c 8 1/3/22    Review of Systems   Review of Systems   Constitutional:  Negative for chills, fever and weight loss.   Respiratory:  Negative for cough and shortness of breath.    Cardiovascular:  Negative for chest pain, palpitations and leg swelling.   Gastrointestinal:  Negative for constipation, diarrhea, nausea and vomiting.   Genitourinary:  Negative for dysuria.   Musculoskeletal:  Negative for myalgias.   Neurological:  Negative for dizziness and weakness.   Psychiatric/Behavioral:  Negative for depression.       Past Medical History:   Past Medical History:   Diagnosis Date    Asthma     Diabetes     Heart burn     Hypertension     Hypothyroidism     Unspecified disorder of thyroid        Patient Active Problem List    Diagnosis Date Noted    Allergies 2023    Primary osteoarthritis of both knees 2022     Full thickness rotator cuff tear 06/01/2021    Superior glenoid labrum lesion of right shoulder 05/20/2021    Chronic rhinitis 01/14/2020    Multiple thyroid nodules 04/08/2019    Mild persistent asthma without complication 01/04/2019    Osteopenia 05/30/2018    Type 2 diabetes mellitus (HCC) 05/02/2017    Essential hypertension 05/02/2017    Dyslipidemia 05/02/2017    Hypothyroidism 05/02/2017       Past Surgical History:   Past Surgical History:   Procedure Laterality Date    SCLERAL BUCKLING  2/12/2013    Performed by Shankar Estrada M.D. at SURGERY SAME DAY [facility]    CHOLECYSTECTOMY          Allergies:  Patient has no known allergies.    Medications:     Current Outpatient Medications:     cetirizine, 10 mg, Oral, DAILY    metFORMIN ER, 1,000 mg, Oral, PM MEAL    losartan-hydrochlorothiazide, 1 Tablet, Oral, DAILY, Taking    atorvastatin, TOME 1 TABLETA CADA TARDE, Taking    omeprazole, TOME 1 CAPSULA DIARIAMENTE, Taking    Neomycin-Polymyxin-Dexameth, neomycin 3.5 mg/g-polymyxin B 10,000 unit/g-dexameth 0.1 % eye oint, Taking    Restasis, , Taking    levothyroxine, 50 mcg, Oral, AM ES, Taking    fluticasone, USE 1 SPRAY NASALLY EVERY DAY AS NEEDED FOR ITCHY, RUNNY NOSE., Taking     Social History:   Social History     Tobacco Use    Smoking status: Never    Smokeless tobacco: Never   Vaping Use    Vaping Use: Never used   Substance Use Topics    Alcohol use: No     Alcohol/week: 0.0 oz    Drug use: No     Family History:   Family History   Problem Relation Age of Onset    Prostate cancer Father     Heart Attack Mother     Diabetes Sister      Vitals:   BP (!) 142/76 (BP Location: Left arm, Patient Position: Sitting, BP Cuff Size: Adult)   Pulse 79   Temp (!) 35.8 °C (96.4 °F) (Temporal)   Ht 1.524 m (5')   Wt 66 kg (145 lb 9.6 oz)   SpO2 95%  Body mass index is 28.44 kg/m².    Physical Exam:   Physical Exam  Constitutional:       General: She is not in acute distress.     Appearance: Normal  appearance. She is not ill-appearing or diaphoretic.   HENT:      Head: Normocephalic and atraumatic.      Mouth/Throat:      Mouth: Mucous membranes are moist.   Eyes:      Extraocular Movements: Extraocular movements intact.      Pupils: Pupils are equal, round, and reactive to light.   Cardiovascular:      Rate and Rhythm: Normal rate and regular rhythm.      Heart sounds: No murmur heard.    No friction rub. No gallop.   Pulmonary:      Effort: No respiratory distress.      Breath sounds: No stridor. No wheezing, rhonchi or rales.   Abdominal:      General: Bowel sounds are normal. There is no distension.      Tenderness: There is no abdominal tenderness. There is no guarding or rebound.   Musculoskeletal:      Right lower leg: No edema.      Left lower leg: No edema.   Skin:     Coloration: Skin is not jaundiced or pale.   Neurological:      General: No focal deficit present.      Mental Status: She is alert and oriented to person, place, and time.      Sensory: Sensory deficit (microfilament R > L) present.   Psychiatric:         Mood and Affect: Mood normal.         Behavior: Behavior normal.      Assessment and Plan    Pt is a 72-year-old F with PMHx of HTN, NIDDM (June 2022 a1c 7.4) c/b peripheral neuropathy,dyslipidmia, hypertriglyceridemia, hypothyroidism (last TSH 1.04 8/27/2020),  GERD, osteopenia, stroke x 3 (1986), osteoarthritis, h/o complete rotator cuff tear presenting today for 6 month follow up. A1c in office 8 today, increased metformin to 1000 mg PO qD. Follow up in 5-10 weeks for BP f/u.    #Diabetes mellitus, type 2, non-insulin dependent   -POC A1c 6/10/22: 7.3 --> 8 1/3/22  -Monofilament exam 1/3/2023: R > L loss of sensation at feet w/o any wounds   PLAN  -Metformin 500mg PO qD- inc to 1000 mg PO qD  -Microalbumin/Cr urine ratio (POC collected)  -Podiatry referral placed  -Follows with ophthalmology   -Follow up for DM in April 2022    #Hypertension   -In office -150s SBP  22  -In office BP 140s 1/3/2023  PLAN  -Losartan-hydrochlorothiazide 50-12.5mg PO qD  -BP log- bring to next visit    -Bring home BP machine to next visit   -Follow up in 5-10 weeks     -- CHRONIC PROBLEMS --      #Hypertriglyceridemia, moderate  #Dyslipidemia  -Lipid panel 7/15/22: total cholesterol 180, , HDL 31, VLDL 60, LDL 90; ASCVD 19.3%  -On Gemfibrozil/ ezetimibe in past- stopped it herself - does not like pills she states   PLAN   -Atorvastatin 40mg PO qD   -CMP + lipid panel repeat with next set of labs    #Gastroestophageal reflux disease  -Abd pain x 6 months , improved with ranitidine in past but she self-discontinued and then discomfort returned  -Significant ibuprofen use hx  **DDx: GERD vs PUD   PLAN  -Omeprazole 20 mg PO qD    #Headaches, improved   -TSH neg 22  **DDx: dehydration vs ibuprofen rebound     #Hypothyroidism  -TSH/ free t4 WNL 2022  PLAN  -Levothyroxine 50mcg PO qD  -Recheck TSH/ free T4 with next set of labs     #Osteoarthritis, bilateral knees     #Healthcare maintenance  #Osteopenia  Mood screenin PHQ-2, CHANDNI 0   Drug screening: none   Sexual activity screening: not sexually active   Infectious disease screening: hep C screening negative, needs HIV screen with next set of labs  Colon cancer screenin years prior normal, FIT test ordered   Vaccines: Tdap (today), COVID/ shingles in local pharmacy  Osteoporosis screenin last, repeat ordered  Breast cancer screenin last screening, normal; re-ordered  Cervical cancer screening: s/p total hysterectomy, aged out  Diabetes screening: a1c 8 1/3/22     Please note that this dictation was created using voice recognition software. I have made every reasonable attempt to correct obvious errors, but I expect that there are errors of grammar and possibly content that I did not discover before finalizing the note.     Patient case was seen/ assessed/ discussed with Dr. Naranjo.     Ant Parson,  PGY-2  Internal Medicine

## 2023-02-28 DIAGNOSIS — E78.5 DYSLIPIDEMIA: ICD-10-CM

## 2023-02-28 RX ORDER — ATORVASTATIN CALCIUM 40 MG/1
TABLET, FILM COATED ORAL
Qty: 90 TABLET | Refills: 0 | Status: SHIPPED | OUTPATIENT
Start: 2023-02-28 | End: 2023-12-08

## 2023-03-10 DIAGNOSIS — E03.9 HYPOTHYROIDISM, UNSPECIFIED TYPE: ICD-10-CM

## 2023-03-10 NOTE — TELEPHONE ENCOUNTER
Requesting for Diclofenac gel     Received request via: Pharmacy    Was the patient seen in the last year in this department? Yes    Does the patient have an active prescription (recently filled or refills available) for medication(s) requested? No    Does the patient have alf Plus and need 100 day supply (blood pressure, diabetes and cholesterol meds only)? Patient does not have SCP

## 2023-03-12 RX ORDER — LEVOTHYROXINE SODIUM 0.05 MG/1
50 TABLET ORAL
Qty: 90 TABLET | Refills: 3 | Status: SHIPPED | OUTPATIENT
Start: 2023-03-12 | End: 2023-12-11 | Stop reason: SDUPTHER

## 2023-05-10 ENCOUNTER — OFFICE VISIT (OUTPATIENT)
Dept: INTERNAL MEDICINE | Facility: OTHER | Age: 73
End: 2023-05-10
Payer: MEDICARE

## 2023-05-10 VITALS
TEMPERATURE: 97.2 F | WEIGHT: 143.4 LBS | OXYGEN SATURATION: 97 % | SYSTOLIC BLOOD PRESSURE: 132 MMHG | BODY MASS INDEX: 28.16 KG/M2 | HEART RATE: 71 BPM | DIASTOLIC BLOOD PRESSURE: 76 MMHG | HEIGHT: 60 IN

## 2023-05-10 DIAGNOSIS — E03.9 HYPOTHYROIDISM, UNSPECIFIED TYPE: ICD-10-CM

## 2023-05-10 DIAGNOSIS — E78.5 DYSLIPIDEMIA: ICD-10-CM

## 2023-05-10 DIAGNOSIS — Z11.4 SCREENING FOR HIV (HUMAN IMMUNODEFICIENCY VIRUS): ICD-10-CM

## 2023-05-10 DIAGNOSIS — E11.42 TYPE 2 DIABETES MELLITUS WITH DIABETIC POLYNEUROPATHY, WITHOUT LONG-TERM CURRENT USE OF INSULIN (HCC): ICD-10-CM

## 2023-05-10 DIAGNOSIS — Z86.2 HISTORY OF THROMBOCYTOSIS: ICD-10-CM

## 2023-05-10 PROBLEM — M75.120 FULL THICKNESS ROTATOR CUFF TEAR: Status: RESOLVED | Noted: 2021-06-01 | Resolved: 2023-05-10

## 2023-05-10 PROBLEM — S43.431A SUPERIOR GLENOID LABRUM LESION OF RIGHT SHOULDER: Status: RESOLVED | Noted: 2021-05-20 | Resolved: 2023-05-10

## 2023-05-10 LAB
HBA1C MFR BLD: 6.9 % (ref ?–5.8)
POCT INT CON NEG: NEGATIVE
POCT INT CON POS: POSITIVE

## 2023-05-10 PROCEDURE — 83036 HEMOGLOBIN GLYCOSYLATED A1C: CPT | Performed by: STUDENT IN AN ORGANIZED HEALTH CARE EDUCATION/TRAINING PROGRAM

## 2023-05-10 PROCEDURE — 99213 OFFICE O/P EST LOW 20 MIN: CPT | Mod: GE | Performed by: STUDENT IN AN ORGANIZED HEALTH CARE EDUCATION/TRAINING PROGRAM

## 2023-05-10 RX ORDER — METFORMIN HYDROCHLORIDE 500 MG/1
TABLET, EXTENDED RELEASE ORAL
COMMUNITY
Start: 2023-03-23 | End: 2023-05-10

## 2023-05-10 RX ORDER — IBUPROFEN 800 MG/1
TABLET ORAL
COMMUNITY
Start: 2023-02-21 | End: 2023-12-11

## 2023-05-10 ASSESSMENT — ENCOUNTER SYMPTOMS
CONSTIPATION: 0
VOMITING: 0
DIZZINESS: 0
DIARRHEA: 0
COUGH: 0
WEIGHT LOSS: 0
DEPRESSION: 0
FEVER: 0
WEAKNESS: 0
MYALGIAS: 0
NAUSEA: 0
CHILLS: 0
PALPITATIONS: 0
SHORTNESS OF BREATH: 0

## 2023-05-10 ASSESSMENT — FIBROSIS 4 INDEX: FIB4 SCORE: 0.7

## 2023-05-10 NOTE — PATIENT INSTRUCTIONS
[phone]- carmen de podiatrista -- hacer britany al  Voltaren gel compra para poner en rodillas arthritis  Vacuna de shingles (herpes) y tambien de covid booster en pharmacia local ([facility]/ [facility])  Dexa scan para mian huesos   Mammographia

## 2023-05-10 NOTE — PROGRESS NOTES
Chief Complaint:  Follow up, annual    Last Seen:   2023    History of Present Illness:     Pt is a 73-year-old F with PMHx of:    HTN  NIDDM (2022 a1c 7.4) c/b peripheral neuropathy  Dyslipidemia  Hypertriglyceridemia  Hypothyroidism (last TSH 1.04 2020)  GERD  Osteopenia  Stroke x 3 ()  Osteoarthritis  H/o complete rotator cuff tear   H/o thrombocytosis     presenting today for follow up.     Diabetes:   Has been keeping good diet.    Hypertension:  Checked BP at home but does not remember numbers but states it has been good. Denies sx of low blood pressure.     ----    Exercise:   very active with gardening, walking    Diet:   low sugar diet. Well balanced diet with vegetables and fruits.     Healthcare maintenance  Mood screenin PHQ-2, CHANDNI 0 (2023)  Drug screening: none   Sexual activity screening: not sexually active   Infectious disease screening: hep C screening negative, HIV screen with next set of labs  Colon cancer screening: Refuses colonoscopies. Occult blood negative 3/2023  Vaccines: COVID/ shingles in local pharmacy  Osteoporosis screenin dexa normal; re-ordered 2023  Breast cancer screenin mammography with heterogeneously dense breasts without malignancy; re-ordered 2022  Cervical cancer screening: s/p total hysterectomy, no longer has cervix   Metabolic screening: a1c 2023, lipid panel 2022    Review of Systems   Review of Systems   Constitutional:  Negative for chills, fever and weight loss.   Respiratory:  Negative for cough and shortness of breath.    Cardiovascular:  Negative for chest pain, palpitations and leg swelling.   Gastrointestinal:  Negative for constipation, diarrhea, nausea and vomiting.   Genitourinary:  Negative for dysuria.   Musculoskeletal:  Negative for myalgias.   Neurological:  Negative for dizziness and weakness.   Psychiatric/Behavioral:  Negative for depression.       Past Medical History:   Past Medical History:   Diagnosis  Date    Asthma     Diabetes     Heart burn     Hypertension     Hypothyroidism     Unspecified disorder of thyroid        Patient Active Problem List    Diagnosis Date Noted    Allergies 01/03/2023    Primary osteoarthritis of both knees 01/21/2022    Chronic rhinitis 01/14/2020    Multiple thyroid nodules 04/08/2019    Mild persistent asthma without complication 01/04/2019    Osteopenia 05/30/2018    Type 2 diabetes mellitus with diabetic polyneuropathy, without long-term current use of insulin ([facility]) 05/02/2017    Essential hypertension 05/02/2017    Dyslipidemia 05/02/2017    Hypothyroidism 05/02/2017       Past Surgical History:   Past Surgical History:   Procedure Laterality Date    SCLERAL BUCKLING  2/12/2013    Performed by Shankar Estrada M.D. at [facility]    CHOLECYSTECTOMY          Allergies:  Patient has no known allergies.    Medications:     Current Outpatient Medications:     ibuprofen, PLEASE SEE ATTACHED FOR DETAILED DIRECTIONS, Taking    levothyroxine, 50 mcg, Oral, AM ES, Taking    atorvastatin, TOME 1 TABLETA CADA TARDE, Taking    cetirizine, 10 mg, Oral, DAILY, Taking    losartan-hydrochlorothiazide, 1 Tablet, Oral, DAILY, Taking    omeprazole, TOME 1 CAPSULA DIARIAMENTE, Taking    Neomycin-Polymyxin-Dexameth, neomycin 3.5 mg/g-polymyxin B 10,000 unit/g-dexameth 0.1 % eye oint, Taking    Restasis, , Taking    fluticasone, USE 1 SPRAY NASALLY EVERY DAY AS NEEDED FOR ITCHY, RUNNY NOSE., Taking    metFORMIN ER, 1,000 mg, Oral, PM MEAL (Patient not taking: Reported on 5/10/2023), Not Taking     Social History:   Social History     Tobacco Use    Smoking status: Never    Smokeless tobacco: Never   Vaping Use    Vaping Use: Never used   Substance Use Topics    Alcohol use: No     Alcohol/week: 0.0 oz    Drug use: No     Family History:   Family History   Problem Relation Age of Onset    Prostate cancer Father     Heart Attack Mother     Diabetes Sister      Vitals:   /76  (BP Location: Right arm, Patient Position: Sitting, BP Cuff Size: Adult)   Pulse 71   Temp 36.2 °C (97.2 °F) (Temporal)   Ht 1.524 m (5')   Wt 65 kg (143 lb 6.4 oz)   SpO2 97%  Body mass index is 28.01 kg/m².    Physical Exam:   Physical Exam  Constitutional:       General: She is not in acute distress.     Appearance: Normal appearance. She is not ill-appearing or diaphoretic.   HENT:      Head: Normocephalic and atraumatic.      Mouth/Throat:      Mouth: Mucous membranes are moist.   Eyes:      Extraocular Movements: Extraocular movements intact.      Pupils: Pupils are equal, round, and reactive to light.   Cardiovascular:      Rate and Rhythm: Normal rate and regular rhythm.      Heart sounds: No murmur heard.     No friction rub. No gallop.   Pulmonary:      Effort: No respiratory distress.      Breath sounds: No stridor. No wheezing, rhonchi or rales.   Abdominal:      General: Bowel sounds are normal. There is no distension.      Tenderness: There is no abdominal tenderness. There is no guarding or rebound.   Musculoskeletal:      Right lower leg: No edema.      Left lower leg: No edema.   Skin:     Coloration: Skin is not jaundiced or pale.   Neurological:      General: No focal deficit present.      Mental Status: She is alert and oriented to person, place, and time.      Sensory: Sensory deficit (microfilament L side decreased sensation more than right; vibratory testing decreased R >L) present.   Psychiatric:         Mood and Affect: Mood normal.         Behavior: Behavior normal.        Assessment and Plan    Pt is a 73-year-old F with PMHx of HTN, NIDDM (June 2022 a1c 7.4) c/b peripheral neuropathy,dyslipidmia, hypertriglyceridemia, hypothyroidism (last TSH 1.04 8/27/2020), GERD, osteopenia, stroke x 3 (1986), osteoarthritis, h/o complete rotator cuff tear presenting today for DM follow up. Follow up in 2 months with fasting labs for medicare wellness visit.     #Hypertension   -In office BP  140-150s SBP 22  -In office BP 140s 1/3/2023  PLAN  -Losartan-hydrochlorothiazide 50-12.5mg PO qD  -Continue to check BP at home    #Diabetes mellitus, type 2, non-insulin dependent, uncontrolled  -POC A1c 6/10/22: 7.3 --> 8 1/3/23 --> 6.9 2023  -Monofilament exam 1/3/2023: R > L loss of sensation at feet w/o any wounds, vibratory sensation WNL  -Microalb/cr ratio WNL 2023  PLAN  -Metformin 1000 mg PO qD  -Podiatry referral placed 2023- information provided  -Follows with ophthalmology 2023  -Follows with dentist (last apt 2023)  -Counseled on importance of up to date vaccinations in setting of DM     -- CHRONIC PROBLEMS --      #Hypertriglyceridemia, moderate  #Dyslipidemia  -Lipid panel 7/15/22: total cholesterol 180, , HDL 31, VLDL 60, LDL 90; ASCVD 19.3%  -On Gemfibrozil/ ezetimibe in past- stopped it herself - does not like pills she states   PLAN   -Atorvastatin 40mg PO qD   -CMP + lipid panel repeat     #Gastroestophageal reflux disease  -Abd pain x 6 months , improved with ranitidine in past but she self-discontinued and then discomfort returned  -Significant ibuprofen use hx  **DDx: GERD vs PUD   PLAN  -Omeprazole 20 mg PO qD    #Headaches, improved   -TSH neg 22  **DDx: dehydration vs ibuprofen rebound     #Hypothyroidism  -TSH/ free t4 WNL 2022  PLAN  -Levothyroxine 50mcg PO qD  -Recheck TSH/ free T4      #Osteoarthritis, bilateral knees     #Healthcare maintenance  Mood screenin PHQ-2, CHANDNI 0 (2023)  Drug screening: none   Sexual activity screening: not sexually active   Infectious disease screening: hep C screening negative, HIV screen with next set of labs  Colon cancer screening: Refuses colonoscopies. Occult blood negative 3/2023  Vaccines: COVID/ shingles in local pharmacy  Osteoporosis screenin dexa normal; re-ordered 2023  Breast cancer screenin mammography with heterogeneously dense breasts without malignancy; re-ordered 2022  Cervical  cancer screening: s/p total hysterectomy, no longer has cervix   Metabolic screening: a1c 1/2023, lipid panel 7/2022     Please note that this dictation was created using voice recognition software. I have made every reasonable attempt to correct obvious errors, but I expect that there are errors of grammar and possibly content that I did not discover before finalizing the note.     Patient case was seen/ assessed/ discussed with Dr. Naranjo.     Ant Parson, PGY-2  Internal Medicine

## 2023-06-07 DIAGNOSIS — T78.40XA ALLERGY, INITIAL ENCOUNTER: ICD-10-CM

## 2023-06-07 RX ORDER — CETIRIZINE HYDROCHLORIDE 10 MG/1
TABLET ORAL
Qty: 90 TABLET | Refills: 1 | Status: SHIPPED | OUTPATIENT
Start: 2023-06-07

## 2023-11-13 ENCOUNTER — OFFICE VISIT (OUTPATIENT)
Dept: INTERNAL MEDICINE | Facility: OTHER | Age: 73
End: 2023-11-13
Payer: MEDICARE

## 2023-11-13 VITALS
TEMPERATURE: 97.2 F | DIASTOLIC BLOOD PRESSURE: 69 MMHG | SYSTOLIC BLOOD PRESSURE: 136 MMHG | HEART RATE: 67 BPM | WEIGHT: 129.8 LBS | HEIGHT: 60 IN | BODY MASS INDEX: 25.48 KG/M2 | OXYGEN SATURATION: 96 %

## 2023-11-13 DIAGNOSIS — E03.9 HYPOTHYROIDISM, UNSPECIFIED TYPE: ICD-10-CM

## 2023-11-13 DIAGNOSIS — Z78.9 IMPAIRED MOBILITY AND ADLS: ICD-10-CM

## 2023-11-13 DIAGNOSIS — E78.5 DYSLIPIDEMIA: ICD-10-CM

## 2023-11-13 DIAGNOSIS — E11.69 TYPE 2 DIABETES MELLITUS WITH OTHER SPECIFIED COMPLICATION, UNSPECIFIED WHETHER LONG TERM INSULIN USE (HCC): ICD-10-CM

## 2023-11-13 DIAGNOSIS — K21.9 GASTROESOPHAGEAL REFLUX DISEASE WITHOUT ESOPHAGITIS: ICD-10-CM

## 2023-11-13 DIAGNOSIS — E11.42 TYPE 2 DIABETES MELLITUS WITH DIABETIC POLYNEUROPATHY, WITHOUT LONG-TERM CURRENT USE OF INSULIN (HCC): ICD-10-CM

## 2023-11-13 DIAGNOSIS — R41.89 COGNITIVE DECLINE: ICD-10-CM

## 2023-11-13 DIAGNOSIS — Z74.09 IMPAIRED MOBILITY AND ADLS: ICD-10-CM

## 2023-11-13 DIAGNOSIS — I10 ESSENTIAL HYPERTENSION: ICD-10-CM

## 2023-11-13 LAB
HBA1C MFR BLD: 6.5 % (ref ?–5.8)
POCT INT CON NEG: NEGATIVE
POCT INT CON POS: POSITIVE

## 2023-11-13 PROCEDURE — 83036 HEMOGLOBIN GLYCOSYLATED A1C: CPT | Performed by: STUDENT IN AN ORGANIZED HEALTH CARE EDUCATION/TRAINING PROGRAM

## 2023-11-13 PROCEDURE — 3075F SYST BP GE 130 - 139MM HG: CPT | Performed by: STUDENT IN AN ORGANIZED HEALTH CARE EDUCATION/TRAINING PROGRAM

## 2023-11-13 PROCEDURE — 3078F DIAST BP <80 MM HG: CPT | Performed by: STUDENT IN AN ORGANIZED HEALTH CARE EDUCATION/TRAINING PROGRAM

## 2023-11-13 PROCEDURE — 99214 OFFICE O/P EST MOD 30 MIN: CPT | Mod: GC | Performed by: STUDENT IN AN ORGANIZED HEALTH CARE EDUCATION/TRAINING PROGRAM

## 2023-11-13 ASSESSMENT — ENCOUNTER SYMPTOMS
DEPRESSION: 0
ORTHOPNEA: 0
MYALGIAS: 0
WHEEZING: 0
SPUTUM PRODUCTION: 0
EYES NEGATIVE: 1
HEARTBURN: 0
NAUSEA: 0
HEMOPTYSIS: 0
WEIGHT LOSS: 0
SHORTNESS OF BREATH: 0
VOMITING: 0
COUGH: 0
CHILLS: 0
PALPITATIONS: 0
FEVER: 0
ABDOMINAL PAIN: 0
CLAUDICATION: 0
DIZZINESS: 0
HEADACHES: 0

## 2023-11-13 ASSESSMENT — FIBROSIS 4 INDEX: FIB4 SCORE: 0.7

## 2023-11-13 NOTE — PATIENT INSTRUCTIONS
-Please follow up with social work,  geriatrics, home health referrals  -Please follow up with outpatient labs  -Please follow up with clinic 1 month

## 2023-11-13 NOTE — ASSESSMENT & PLAN NOTE
-POC A1c 6.5 (11/13/2012)  -Monofilament exam 1/3/2023: R > L loss of sensation at feet w/o any wounds, vibratory sensation WNL  -Microalb/cr ratio WNL 1/2023  -Continue with Metformin 1000mg daily  -Followed up with Optho 4/23  -Followed up with Dentist 1/23

## 2023-11-13 NOTE — ASSESSMENT & PLAN NOTE
-Provided referral to Geriatrics  -Provided referral to Social Work  -Provided referral to Home Health for medication management

## 2023-11-13 NOTE — ASSESSMENT & PLAN NOTE
-Seems that patient not adherent to taking her Levothyroxine  -Follow up thyroid studies included with outpatient labs

## 2023-11-13 NOTE — PROGRESS NOTES
Established Patient    Chief Complaint   Patient presents with    Follow-Up     Children want to see where she is medically     Memory Loss    Cough     Since yesterday        HISTORY OF PRESENT ILLNESS:     Mrs. Estes is our 73 year old female patient with a past medical history significant for HTN, T2DM, HLD, Hypertriglyceridemia, Hypothyroidism, GERD, Osteopenia, CVA x3 (1986), Osteoarthritis who presents to clinic today for routine follow up. She is accompanied by her son and daughter-in-law who would like to review her health history, and provide some additional context regarding patient's day to day activities and lifestyle.    Of note, patient has been living alone for the majority of the past year. Typically she lives at home with her , but he has been having several medical issues which have kept him in the hospital, and kept patient home alone. Patient's son is seeing her for the first time in a year, as he lives in [facility]; first thing that stuck out was that patient's home was a bit unkempt, as patient does not clean house regularly. With regard to ADLS, patient can independently cook, do laundry, tend to her 2 cats and 1 dog, dress herself, shower. She spends most time alone at home. Goes to [employer] on Tuesdays and Sundays. Is unable to drive herself for groceries; however, friends from [employer] help drive her to do this.    Upon review of medications, it seems that patient is confused and not able to recall her medication regimen. Showed bottle of Levothyroxine filled in May for 3 month course with about 10-20 pills left. Seems patient had not been entirely adherent to her med regimen.    At this time, providing referral to Home Health for medication management, Social Work to discuss long-term options, and Geriatrics.    Patient's son mentioned thinks patient's memory or processing is a bit slower. Denies facial droop, slurred speech, recent falls. Included Vitamin B1, B6, B12, Folate with  additional outpatient labs yet to be done including HIV, Lipid Profile, CMP, CBC, TSH.    Past Medical History:   Diagnosis Date    Asthma     Diabetes     Heart burn     Hypertension     Hypothyroidism     Unspecified disorder of thyroid        Patient Active Problem List    Diagnosis Date Noted    GERD (gastroesophageal reflux disease) 11/13/2023    Impaired mobility and ADLs 11/13/2023    Cognitive decline 11/13/2023    Allergies 01/03/2023    Primary osteoarthritis of both knees 01/21/2022    Chronic rhinitis 01/14/2020    Multiple thyroid nodules 04/08/2019    Mild persistent asthma without complication 01/04/2019    Osteopenia 05/30/2018    Type 2 diabetes mellitus with diabetic polyneuropathy, without long-term current use of insulin ([facility]) 05/02/2017    Essential hypertension 05/02/2017    Dyslipidemia 05/02/2017    Hypothyroidism 05/02/2017       Allergies:Patient has no known allergies.    Current Outpatient Medications   Medication Sig Dispense Refill    cetirizine (ZYRTEC) 10 MG Tab TOME 1 TABLETA CADA WILSON 90 Tablet 1    levothyroxine (SYNTHROID) 50 MCG Tab Take 1 Tablet by mouth every morning on an empty stomach. 90 Tablet 3    atorvastatin (LIPITOR) 40 MG Tab TOME 1 TABLETA CADA TARDE 90 Tablet 0    metFORMIN ER 1000 MG TABLET SR 24 HR Take 1 Tablet by mouth with dinner. 90 Tablet 2    losartan-hydrochlorothiazide (HYZAAR) 50-12.5 MG per tablet Take 1 Tablet by mouth every day. 90 Tablet 3    ibuprofen (MOTRIN) 800 MG Tab PLEASE SEE ATTACHED FOR DETAILED DIRECTIONS (Patient not taking: Reported on 11/13/2023)      omeprazole (PRILOSEC) 40 MG delayed-release capsule TOME 1 CAPSULA DIARIAMENTE (Patient not taking: Reported on 11/13/2023) 90 Capsule 3    Neomycin-Polymyxin-Dexameth 0.1 % Ointment neomycin 3.5 mg/g-polymyxin B 10,000 unit/g-dexameth 0.1 % eye oint (Patient not taking: Reported on 11/13/2023)      RESTASIS 0.05 % ophthalmic emulsion  (Patient not taking: Reported on 11/13/2023)       fluticasone (FLONASE) 50 MCG/ACT nasal spray USE 1 SPRAY NASALLY EVERY DAY AS NEEDED FOR ITCHY, RUNNY NOSE. (Patient not taking: Reported on 11/13/2023) 16 g 6     No current facility-administered medications for this visit.       Social History     Tobacco Use    Smoking status: Never    Smokeless tobacco: Never   Vaping Use    Vaping Use: Never used   Substance Use Topics    Alcohol use: No     Alcohol/week: 0.0 oz    Drug use: No       Family History   Problem Relation Age of Onset    Prostate cancer Father     Heart Attack Mother     Diabetes Sister          Review of Systems   Constitutional:  Negative for chills, fever, malaise/fatigue and weight loss.   HENT: Negative.     Eyes: Negative.    Respiratory:  Negative for cough, hemoptysis, sputum production, shortness of breath and wheezing.    Cardiovascular:  Negative for chest pain, palpitations, orthopnea and claudication.   Gastrointestinal:  Negative for abdominal pain, heartburn, nausea and vomiting.   Genitourinary:  Negative for dysuria, frequency and urgency.   Musculoskeletal:  Positive for joint pain. Negative for myalgias.        Right shoulder pain   Skin:  Negative for itching and rash.   Neurological:  Negative for dizziness and headaches.   Psychiatric/Behavioral:  Negative for depression.        Exam:  /69 (BP Location: Right arm, Patient Position: Sitting, BP Cuff Size: Adult)   Pulse 67   Temp 36.2 °C (97.2 °F) (Temporal)   Ht 1.524 m (5')   Wt 58.9 kg (129 lb 12.8 oz)   SpO2 96%  Body mass index is 25.35 kg/m².    Constitutional:  Not in acute distress, well appearing.  HEENT:   Normocephalic, atraumatic.  Cardiovascular: S1, S2; Regular rate and rhythm; No murmurs/rubs/gallops.  Lungs:   Clear to auscultation bilaterally; No wheezes/rhales/rhonchi; No respiratory distress.  Abdomen: Soft, nondistended, not tender to palpation; no guarding no rigidity; no masses.  Extremities:  No cyanosis/clubbing/edema; No obvious deformities.  4+ strength in TYRELL on resistance to push  Skin:  Warm and dry.  No visible rashes.  Neurologic: Alert Awake & Oriented x 3, CN II-XII grossly intact; strength and sensation grossly intact.  No focal deficits noted.  Psychiatric:  Affect normal, mood normal, judgment normal.    Assessment/Plan:   Mrs. Estes is our 73 year old female patient with a past medical history significant for HTN, T2DM, HLD, Hypertriglyceridemia, Hypothyroidism, GERD, Osteopenia, CVA x3 (1986), Osteoarthritis who presents to clinic today for routine follow up.    Essential hypertension  -Blood Pressure 136/69  -Continue with Losartan-HCTZ 50-12.5mg daily  -Patient unable to record blood pressures at home alone, per son  -Denies any chest pain, shortness of breath, lightheadedness, dizziness    Type 2 diabetes mellitus with diabetic polyneuropathy, without long-term current use of insulin ([facility])  -POC A1c 6.5 (11/13/2012)  -Monofilament exam 1/3/2023: R > L loss of sensation at feet w/o any wounds, vibratory sensation WNL  -Microalb/cr ratio WNL 1/2023  -Continue with Metformin 1000mg daily  -Followed up with Optho 4/23  -Followed up with Dentist 1/23    Dyslipidemia  -Continue with Atorvastatin 40mg daily  -Follow up CMP and Lipid Profile    GERD (gastroesophageal reflux disease)  -Continue with Omeprazole 20mg daily    Hypothyroidism  -Seems that patient not adherent to taking her Levothyroxine  -Follow up thyroid studies included with outpatient labs    Impaired mobility and ADLs  -Provided referral to Geriatrics  -Provided referral to Social Work  -Provided referral to Home Health for medication management    Cognitive decline  -Patient's son mentioned thinks patient's memory or processing is a bit slower since seeing her a year ago  -Likely exacerbated by living alone during this time  -Denies facial droop, slurred speech, recent falls  -Included Vitamin B1, B6, B12, Folate with additional outpatient labs    All imaging results and lab  results and consult notes are reviewed at this visit.  Followup: Return in about 4 weeks (around 12/11/2023).    Jethro Heredia MD  PGY-3 Internal Medicine Resident

## 2023-11-13 NOTE — ASSESSMENT & PLAN NOTE
-Patient's son mentioned thinks patient's memory or processing is a bit slower since seeing her a year ago  -Likely exacerbated by living alone during this time  -Denies facial droop, slurred speech, recent falls  -Included Vitamin B1, B6, B12, Folate with additional outpatient labs

## 2023-11-13 NOTE — ASSESSMENT & PLAN NOTE
-Blood Pressure 136/69  -Continue with Losartan-HCTZ 50-12.5mg daily  -Patient unable to record blood pressures at home alone, per son  -Denies any chest pain, shortness of breath, lightheadedness, dizziness

## 2023-11-14 ENCOUNTER — HOSPITAL ENCOUNTER (OUTPATIENT)
Dept: LAB | Facility: MEDICAL CENTER | Age: 73
End: 2023-11-14
Attending: STUDENT IN AN ORGANIZED HEALTH CARE EDUCATION/TRAINING PROGRAM
Payer: MEDICARE

## 2023-11-14 DIAGNOSIS — R41.89 COGNITIVE DECLINE: ICD-10-CM

## 2023-11-14 LAB
FOLATE SERPL-MCNC: 24.4 NG/ML
VIT B12 SERPL-MCNC: 651 PG/ML (ref 211–911)

## 2023-11-14 PROCEDURE — 84425 ASSAY OF VITAMIN B-1: CPT

## 2023-11-14 PROCEDURE — 84207 ASSAY OF VITAMIN B-6: CPT

## 2023-11-14 PROCEDURE — 82607 VITAMIN B-12: CPT

## 2023-11-14 PROCEDURE — 36415 COLL VENOUS BLD VENIPUNCTURE: CPT

## 2023-11-14 PROCEDURE — 82746 ASSAY OF FOLIC ACID SERUM: CPT

## 2023-11-16 LAB — VIT B6 SERPL-MCNC: 115.3 NMOL/L (ref 20–125)

## 2023-11-17 LAB — VIT B1 BLD-MCNC: 199 NMOL/L (ref 70–180)

## 2023-11-29 ENCOUNTER — PATIENT OUTREACH (OUTPATIENT)
Dept: INTERNAL MEDICINE | Facility: OTHER | Age: 73
End: 2023-11-29
Payer: MEDICARE

## 2023-11-29 NOTE — PROGRESS NOTES
JAYCE Intern left voicemail for patient's son Obdulio at [phone] regarding setting up [place] with [facility]. JAYCE Intern left direct phone number [phone] regarding any questions he has regarding the referral. JAYCE Alarcon will call again 12/05/23 to follow-up with Obdulio regarding referral.

## 2023-11-29 NOTE — PROGRESS NOTES
JAYCE Alarcon spoke with patient using  Merissa [id number] via [employer] [phone] regarding a referral Dr. Heredia sent on 11/13/23 for home health. Patient states she was not aware she was being referred for home health and asked me to speak with her son Obdulio who lives in [place] but sets her appointments up for her. JAYCE Alarcon agreed to call Obdulio [phone] to see if he has called to set [place] up with [facility]. JAYCE Alarcon will call patient on 12/05/23 to see if she has spoken to her son about home health and if there are any questions or concerns that she may have.

## 2023-12-05 ENCOUNTER — PATIENT OUTREACH (OUTPATIENT)
Dept: INTERNAL MEDICINE | Facility: OTHER | Age: 73
End: 2023-12-05
Payer: MEDICARE

## 2023-12-06 DIAGNOSIS — E78.5 DYSLIPIDEMIA: ICD-10-CM

## 2023-12-06 NOTE — PROGRESS NOTES
JAYCE Alarcon spoke with patient's son Obdulio Melara [phone] about setting [place] up with home health and geriatric care. Obdulio requested information sent to him via email [email or web address]. JAYCE Alarcon sent contact information about referrals sent by Dr. Heredia to [facility] [phone] and [facility] [phone], as well as transportation information for [facility] [phone] and [facility] [phone]. JAYCE Alarcon sent direct phone number [phone] to Obdulio if he has any questions or concerns. JAYCE Alarcon will follow-up as needed.

## 2023-12-08 RX ORDER — ATORVASTATIN CALCIUM 40 MG/1
TABLET, FILM COATED ORAL
Qty: 90 TABLET | Refills: 1 | Status: SHIPPED | OUTPATIENT
Start: 2023-12-08 | End: 2023-12-11 | Stop reason: SDUPTHER

## 2023-12-08 RX ORDER — METFORMIN HYDROCHLORIDE 500 MG/1
TABLET, EXTENDED RELEASE ORAL
Qty: 90 TABLET | Refills: 1 | Status: SHIPPED | OUTPATIENT
Start: 2023-12-08 | End: 2023-12-11

## 2023-12-11 ENCOUNTER — OFFICE VISIT (OUTPATIENT)
Dept: INTERNAL MEDICINE | Facility: OTHER | Age: 73
End: 2023-12-11
Payer: MEDICARE

## 2023-12-11 VITALS
HEIGHT: 60 IN | SYSTOLIC BLOOD PRESSURE: 153 MMHG | WEIGHT: 129 LBS | HEART RATE: 73 BPM | OXYGEN SATURATION: 95 % | TEMPERATURE: 96.7 F | BODY MASS INDEX: 25.32 KG/M2 | DIASTOLIC BLOOD PRESSURE: 72 MMHG

## 2023-12-11 DIAGNOSIS — Z86.2 HISTORY OF THROMBOCYTOSIS: ICD-10-CM

## 2023-12-11 DIAGNOSIS — E03.9 HYPOTHYROIDISM, UNSPECIFIED TYPE: ICD-10-CM

## 2023-12-11 DIAGNOSIS — Z23 INFLUENZA VACCINE NEEDED: ICD-10-CM

## 2023-12-11 DIAGNOSIS — R45.86 MOOD DISTURBANCE: ICD-10-CM

## 2023-12-11 DIAGNOSIS — Z11.4 ENCOUNTER FOR SCREENING FOR HIV: ICD-10-CM

## 2023-12-11 DIAGNOSIS — I10 ESSENTIAL HYPERTENSION: ICD-10-CM

## 2023-12-11 DIAGNOSIS — R41.3 MEMORY DISTURBANCE: ICD-10-CM

## 2023-12-11 DIAGNOSIS — Z12.31 ENCOUNTER FOR SCREENING MAMMOGRAM FOR MALIGNANT NEOPLASM OF BREAST: ICD-10-CM

## 2023-12-11 DIAGNOSIS — E78.5 DYSLIPIDEMIA: ICD-10-CM

## 2023-12-11 DIAGNOSIS — E11.42 TYPE 2 DIABETES MELLITUS WITH DIABETIC POLYNEUROPATHY, WITHOUT LONG-TERM CURRENT USE OF INSULIN (HCC): ICD-10-CM

## 2023-12-11 PROCEDURE — 3077F SYST BP >= 140 MM HG: CPT | Performed by: STUDENT IN AN ORGANIZED HEALTH CARE EDUCATION/TRAINING PROGRAM

## 2023-12-11 PROCEDURE — G0008 ADMIN INFLUENZA VIRUS VAC: HCPCS | Performed by: STUDENT IN AN ORGANIZED HEALTH CARE EDUCATION/TRAINING PROGRAM

## 2023-12-11 PROCEDURE — 90662 IIV NO PRSV INCREASED AG IM: CPT | Performed by: STUDENT IN AN ORGANIZED HEALTH CARE EDUCATION/TRAINING PROGRAM

## 2023-12-11 PROCEDURE — 99214 OFFICE O/P EST MOD 30 MIN: CPT | Mod: 25,GC | Performed by: STUDENT IN AN ORGANIZED HEALTH CARE EDUCATION/TRAINING PROGRAM

## 2023-12-11 PROCEDURE — 3078F DIAST BP <80 MM HG: CPT | Performed by: STUDENT IN AN ORGANIZED HEALTH CARE EDUCATION/TRAINING PROGRAM

## 2023-12-11 RX ORDER — LOSARTAN POTASSIUM AND HYDROCHLOROTHIAZIDE 12.5; 5 MG/1; MG/1
1 TABLET ORAL DAILY
Qty: 90 TABLET | Refills: 3 | Status: SHIPPED | OUTPATIENT
Start: 2023-12-11 | End: 2024-01-22

## 2023-12-11 RX ORDER — METFORMIN HYDROCHLORIDE EXTENDED-RELEASE TABLETS 1000 MG/1
1000 TABLET, FILM COATED, EXTENDED RELEASE ORAL
Qty: 90 TABLET | Refills: 2 | Status: SHIPPED | OUTPATIENT
Start: 2023-12-11 | End: 2024-02-27

## 2023-12-11 RX ORDER — LEVOTHYROXINE SODIUM 0.05 MG/1
50 TABLET ORAL
Qty: 90 TABLET | Refills: 3 | Status: SHIPPED | OUTPATIENT
Start: 2023-12-11 | End: 2024-03-22 | Stop reason: SDUPTHER

## 2023-12-11 RX ORDER — ATORVASTATIN CALCIUM 40 MG/1
TABLET, FILM COATED ORAL
Qty: 90 TABLET | Refills: 1 | Status: SHIPPED | OUTPATIENT
Start: 2023-12-11 | End: 2024-03-22 | Stop reason: SDUPTHER

## 2023-12-11 ASSESSMENT — ENCOUNTER SYMPTOMS
CONSTIPATION: 0
NAUSEA: 0
DIZZINESS: 1
COUGH: 0
PALPITATIONS: 0
SHORTNESS OF BREATH: 0
DEPRESSION: 1
MYALGIAS: 0
SPUTUM PRODUCTION: 0
CHILLS: 0
FEVER: 0
HEADACHES: 1
VOMITING: 0
WEAKNESS: 0
WEIGHT LOSS: 1
DIARRHEA: 0

## 2023-12-11 ASSESSMENT — PATIENT HEALTH QUESTIONNAIRE - PHQ9
5. POOR APPETITE OR OVEREATING: 0 - NOT AT ALL
SUM OF ALL RESPONSES TO PHQ QUESTIONS 1-9: 6
CLINICAL INTERPRETATION OF PHQ2 SCORE: 3

## 2023-12-11 ASSESSMENT — FIBROSIS 4 INDEX: FIB4 SCORE: 0.7

## 2023-12-11 NOTE — PATIENT INSTRUCTIONS
[facility]  [address] [facility] [address] [address]  Phone: [phone]  Fax: None    "[facility]"  [id number]  [facility] [address]  Phone: [phone]    Call geriatric specialist to make apt  Call home health services to set this up  Shingles vaccine   Neurology referral for memory evaluation  Bring papers from mammography results   Follow up in 5 weeks

## 2023-12-11 NOTE — PROGRESS NOTES
Chief Complaint:  Chest pain    Last Seen:   5/2023    History of Present Illness:     Pt is a 73-year-old F with PMHx of:    Hypertension  NIDDM (June 2022 a1c 7.4) c/b peripheral neuropathy, well controlled   Dyslipidemia  Hypertriglyceridemia  Hypothyroidism (last TSH 1.04 8/27/2020)  GERD  Osteopenia  Stroke x 3 (1986)  Osteoarthritis  H/o complete rotator cuff tear   H/o thrombocytosis 2021    presenting today for chest pain accompanied by her brother with pt permission.    Chest pain:  Has had this since two months ago. Occurring every hour every day. Not taking any medication for this. Nothing helps this. Walking/ exercise does not make chest pain worse. Denies hear palpitations. Denies lightheadedness but notes dizziness. Does not appear room is spinning. With position changes notices it. States she is very well hydrated. States worse with recent cough and worse with pressure application. Non radiating-- located in ribs b/l on either side of sternum.     Weight loss   Weighed 143 lbs in May 2023 and now at 129 lbs. Not intentional. Denies night sweats/chills.  Denies vaginal bleeding or blood in urine or stool.  Denies cough with blood.  Denies any other additional symptoms.  Denies decrease in appetite or change in eating habits.  Denies diarrhea.    Headache:   Has not been checking blood pressure at home or taking BP medications. Both sides hurt and the back. When she takes the medication she does not have these sx.      Hypertension:  Checked BP at home but does not remember numbers but states it has been good. Denies sx of low blood pressure.     Fall:   Fell yesterday after tripping in garden after tripping on hose. Did not hit her head. Fell onto right shoulder and hip.     Mood:   Pt partner is hospice care in [place]. States she has been sad since partner in hospice. PHQ9 score 6.     States she has been out of medication for 1 month.     ----    Exercise:   very active with gardening,  walking    Diet:   low sugar diet. Well balanced diet with vegetables and fruits.     Healthcare maintenance  Mood screening: PHQ9 score 6 (2023)  Drug screening: none   Sexual activity screening: not sexually active   Infectious disease screening: hep C screening negative, HIV screen ordered  Colon cancer screening: Refuses colonoscopies. Occult blood negative 3/2023  Vaccines: COVID/ shingles in local pharmacy  Osteoporosis screenin dexa normal; re-ordered 2023  Breast cancer screenin mammography with heterogeneously dense breasts without malignancy. Mother had breast cancer.   Cervical cancer screening: s/p total hysterectomy, no longer has cervix   Metabolic screening: a1c 2023, lipid panel 2022    Review of Systems   Review of Systems   Constitutional:  Positive for weight loss. Negative for chills and fever.   Respiratory:  Negative for cough, sputum production and shortness of breath.    Cardiovascular:  Positive for chest pain. Negative for palpitations and leg swelling.   Gastrointestinal:  Negative for constipation, diarrhea, nausea and vomiting.   Genitourinary:  Negative for dysuria.   Musculoskeletal:  Negative for myalgias.   Neurological:  Positive for dizziness and headaches. Negative for weakness.   Psychiatric/Behavioral:  Positive for depression.       Past Medical History:   Past Medical History:   Diagnosis Date    Asthma     Diabetes     Heart burn     Hypertension     Hypothyroidism     Unspecified disorder of thyroid      Patient Active Problem List    Diagnosis Date Noted    GERD (gastroesophageal reflux disease) 2023    Impaired mobility and ADLs 2023    Cognitive decline 2023    Allergies 2023    Primary osteoarthritis of both knees 2022    Chronic rhinitis 2020    Multiple thyroid nodules 2019    Mild persistent asthma without complication 2019    Osteopenia 2018    Type 2 diabetes mellitus with diabetic  polyneuropathy, without long-term current use of insulin ([facility]) 05/02/2017    Essential hypertension 05/02/2017    Dyslipidemia 05/02/2017    Hypothyroidism 05/02/2017     Past Surgical History:   Past Surgical History:   Procedure Laterality Date    SCLERAL BUCKLING  2/12/2013    Performed by Shankar Estrada M.D. at [facility]    CHOLECYSTECTOMY          Allergies:  Patient has no known allergies.    Medications:     Current Outpatient Medications:     atorvastatin, TOME 1 TABLETA CADA TARDE    levothyroxine, 50 mcg, Oral, AM ES    losartan-hydrochlorothiazide, 1 Tablet, Oral, DAILY    metFORMIN ER, 1,000 mg, Oral, PM MEAL    cetirizine, TOME 1 TABLETA CADA WILSON, Taking     Social History:   Social History     Tobacco Use    Smoking status: Never    Smokeless tobacco: Never   Vaping Use    Vaping Use: Never used   Substance Use Topics    Alcohol use: No     Alcohol/week: 0.0 oz    Drug use: No     Family History:   Family History   Problem Relation Age of Onset    Prostate cancer Father     Heart Attack Mother     Diabetes Sister      Vitals:   BP (!) 153/72   Pulse 73   Temp 35.9 °C (96.7 °F) (Temporal)   Ht 1.524 m (5')   Wt 58.5 kg (129 lb)   SpO2 95%  Body mass index is 25.19 kg/m².    Physical Exam:   Physical Exam  Constitutional:       General: She is not in acute distress.     Appearance: Normal appearance. She is not ill-appearing or diaphoretic.   HENT:      Head: Normocephalic and atraumatic.      Mouth/Throat:      Mouth: Mucous membranes are moist.   Eyes:      Extraocular Movements: Extraocular movements intact.      Pupils: Pupils are equal, round, and reactive to light.   Cardiovascular:      Rate and Rhythm: Normal rate and regular rhythm.      Heart sounds: No murmur heard.     No friction rub. No gallop.   Pulmonary:      Effort: No respiratory distress.      Breath sounds: No stridor. No wheezing, rhonchi or rales.   Abdominal:      General: Bowel sounds are normal.  There is no distension.      Tenderness: There is no abdominal tenderness. There is no guarding or rebound.   Musculoskeletal:      Right lower leg: No edema.      Left lower leg: No edema.   Skin:     Coloration: Skin is not jaundiced or pale.   Neurological:      General: No focal deficit present.      Mental Status: She is alert and oriented to person, place, and time.      Sensory: Sensory deficit (microfilament L side decreased sensation more than right; vibratory testing decreased R >L) present.   Psychiatric:         Mood and Affect: Mood normal.         Behavior: Behavior normal.        Assessment and Plan    Pt is a 73-year-old F with PMHx of HTN, NIDDM (June 2022 a1c 7.4) c/b peripheral neuropathy,dyslipidmia, hypertriglyceridemia, hypothyroidism (last TSH 1.04 8/27/2020), GERD, osteopenia, stroke x 3 (1986), osteoarthritis, h/o complete rotator cuff tear presenting today for medication refills and chest pain.  Patient will be unable to follow-up with our office due to insurance change.    #Hypertension, uncontrolled  -In office  over 70s 12/2023 in the setting of medication nonadherence due to running out of meds  PLAN  -Losartan-hydrochlorothiazide 50-12.5mg PO qdaily   -Continue to check BP at home    #Memory loss  Patient with memory loss over the past 6 to 12 months notable by family members.  Workup thus far with normal B12, B1, B6, folate levels.  States she sleeps well.  PHQ-9 score DDx includes mood disturbance in setting of partner recently placed on hospice care versus dementia/Alzheimer's vs medication nonadherence leading to cognitive impairments vs normal aging  PLAN  -RPR, HIV screen  -Neurology referral for neuropsychiatric testing--consideration for additional imaging such as MRI  -Home health referral, geriatric consult information provided    #Chest pain, musculoskeletal  Pain ongoing for at least 2 weeks after viral URI causing cough.  Located bilateral rib cage triggered by  palpation  PLAN  -Supportive measures: Heat, Tylenol, topical diclofenac, topical lidocaine all OTC  -Counseled on symptoms of heart attack/typical chest pain given patient's risk factors    #Diabetes mellitus, type 2, non-insulin dependent, well controlled  -POC A1c 6/10/22: 7.3 --> 8 1/3/23 --> 6.9 2023  -Monofilament exam 1/3/2023: R > L loss of sensation at feet w/o any wounds, vibratory sensation WNL  -Microalb/cr ratio WNL 2023  PLAN  -Metformin 1000 mg PO qdaily  -Podiatry referral placed 2023- information provided  -Follows with ophthalmology 2023  -Follows with dentist (last apt 2023)  -Counseled on importance of up to date vaccinations in setting of DM     #Hypertriglyceridemia, moderate  #Dyslipidemia  -Lipid panel 7/15/22: total cholesterol 180, , HDL 31, VLDL 60, LDL 90; ASCVD 19.3%  -On Gemfibrozil/ ezetimibe in past- stopped it herself - does not like pills she states   PLAN   -Atorvastatin 40mg PO daily  -CMP + lipid panel repeat     #Hypothyroidism  -TSH/ free t4 WNL 2022  PLAN  -Levothyroxine 50mcg PO daily  -Recheck TSH/ free T4     -- CHRONIC PROBLEMS --     #Gastroestophageal reflux disease  -Abd pain x 6 months , improved with ranitidine in past but she self-discontinued and then discomfort returned  -Significant ibuprofen use hx  **DDx: GERD vs PUD   PLAN  -Omeprazole 20 mg PO daily    #Headaches, improved   -TSH neg 22  **DDx: dehydration vs ibuprofen rebound     #Osteoarthritis, bilateral knees     #Healthcare maintenance  Mood screenin PHQ-2, CHANDNI 0 (2023)  Drug screening: none   Sexual activity screening: not sexually active   Infectious disease screening: hep C screening negative, HIV screen with next set of labs  Colon cancer screening: Refuses colonoscopies. Occult blood negative 3/2023  Vaccines: COVID/ shingles in local pharmacy  Osteoporosis screenin dexa normal; re-ordered 2023  Breast cancer screenin mammography with  heterogeneously dense breasts without malignancy; re-ordered 7/2022  Cervical cancer screening: s/p total hysterectomy, no longer has cervix   Metabolic screening: a1c 1/2023, lipid panel 7/2022     Please note that this dictation was created using voice recognition software. I have made every reasonable attempt to correct obvious errors, but I expect that there are errors of grammar and possibly content that I did not discover before finalizing the note.     Patient case was seen/ assessed/ discussed with Dr. Parada.     Ant Parson, PGY-3  Internal Medicine

## 2023-12-12 ENCOUNTER — TELEPHONE (OUTPATIENT)
Dept: INTERNAL MEDICINE | Facility: OTHER | Age: 73
End: 2023-12-12
Payer: MEDICARE

## 2023-12-12 NOTE — TELEPHONE ENCOUNTER
Dear Tonya,     Your prescription for   Requested Prescriptions      No prescriptions requested or ordered in this encounter        has been sent to the   [facility] - [place], [place] - [address]  [address] [address]  Phone: [phone] Fax: [phone]      Please contact your pharmacy to see when it will be ready for you to .      Thank you,     Mee Marin, Med Ass't

## 2023-12-12 NOTE — TELEPHONE ENCOUNTER
Pt's daughter-n-law called in and asked when the patient had her last chris done?  Pt's daughter n law would like a call back #[phone]

## 2023-12-13 NOTE — TELEPHONE ENCOUNTER
Phone Number Called: [phone] (home)       Call outcome: Left detailed message for patient. Informed to call back with any additional questions.    Message: Message was left that last mammogram was on 1/3/23 and was confirmed with Dr. Parson. Closing encounter.

## 2024-01-04 ENCOUNTER — HOSPITAL ENCOUNTER (OUTPATIENT)
Dept: LAB | Facility: MEDICAL CENTER | Age: 74
End: 2024-01-04
Attending: STUDENT IN AN ORGANIZED HEALTH CARE EDUCATION/TRAINING PROGRAM
Payer: MEDICARE

## 2024-01-04 DIAGNOSIS — E78.5 DYSLIPIDEMIA: ICD-10-CM

## 2024-01-04 DIAGNOSIS — Z11.4 ENCOUNTER FOR SCREENING FOR HIV: ICD-10-CM

## 2024-01-04 DIAGNOSIS — R41.3 MEMORY DISTURBANCE: ICD-10-CM

## 2024-01-04 DIAGNOSIS — E11.42 TYPE 2 DIABETES MELLITUS WITH DIABETIC POLYNEUROPATHY, WITHOUT LONG-TERM CURRENT USE OF INSULIN (HCC): ICD-10-CM

## 2024-01-04 DIAGNOSIS — R45.86 MOOD DISTURBANCE: ICD-10-CM

## 2024-01-04 DIAGNOSIS — Z86.2 HISTORY OF THROMBOCYTOSIS: ICD-10-CM

## 2024-01-04 LAB
25(OH)D3 SERPL-MCNC: 30 NG/ML (ref 30–100)
ALBUMIN SERPL BCP-MCNC: 4.2 G/DL (ref 3.2–4.9)
ALBUMIN/GLOB SERPL: 1.2 G/DL
ALP SERPL-CCNC: 60 U/L (ref 30–99)
ALT SERPL-CCNC: 19 U/L (ref 2–50)
ANION GAP SERPL CALC-SCNC: 15 MMOL/L (ref 7–16)
AST SERPL-CCNC: 21 U/L (ref 12–45)
BASOPHILS # BLD AUTO: 1 % (ref 0–1.8)
BASOPHILS # BLD: 0.08 K/UL (ref 0–0.12)
BILIRUB SERPL-MCNC: 0.7 MG/DL (ref 0.1–1.5)
BUN SERPL-MCNC: 12 MG/DL (ref 8–22)
CALCIUM ALBUM COR SERPL-MCNC: 9.6 MG/DL (ref 8.5–10.5)
CALCIUM SERPL-MCNC: 9.8 MG/DL (ref 8.5–10.5)
CHLORIDE SERPL-SCNC: 99 MMOL/L (ref 96–112)
CHOLEST SERPL-MCNC: 123 MG/DL (ref 100–199)
CO2 SERPL-SCNC: 26 MMOL/L (ref 20–33)
CREAT SERPL-MCNC: 0.7 MG/DL (ref 0.5–1.4)
CREAT UR-MCNC: 140.88 MG/DL
EOSINOPHIL # BLD AUTO: 0.18 K/UL (ref 0–0.51)
EOSINOPHIL NFR BLD: 2.2 % (ref 0–6.9)
ERYTHROCYTE [DISTWIDTH] IN BLOOD BY AUTOMATED COUNT: 40.5 FL (ref 35.9–50)
GFR SERPLBLD CREATININE-BSD FMLA CKD-EPI: 90 ML/MIN/1.73 M 2
GLOBULIN SER CALC-MCNC: 3.6 G/DL (ref 1.9–3.5)
GLUCOSE SERPL-MCNC: 124 MG/DL (ref 65–99)
HCT VFR BLD AUTO: 40.4 % (ref 37–47)
HDLC SERPL-MCNC: 43 MG/DL
HGB BLD-MCNC: 14.1 G/DL (ref 12–16)
HIV 1+2 AB+HIV1 P24 AG SERPL QL IA: NORMAL
IMM GRANULOCYTES # BLD AUTO: 0.02 K/UL (ref 0–0.11)
IMM GRANULOCYTES NFR BLD AUTO: 0.2 % (ref 0–0.9)
LDLC SERPL CALC-MCNC: 32 MG/DL
LYMPHOCYTES # BLD AUTO: 2.58 K/UL (ref 1–4.8)
LYMPHOCYTES NFR BLD: 31 % (ref 22–41)
MCH RBC QN AUTO: 30.3 PG (ref 27–33)
MCHC RBC AUTO-ENTMCNC: 34.9 G/DL (ref 32.2–35.5)
MCV RBC AUTO: 86.9 FL (ref 81.4–97.8)
MICROALBUMIN UR-MCNC: 1.7 MG/DL
MICROALBUMIN/CREAT UR: 12 MG/G (ref 0–30)
MONOCYTES # BLD AUTO: 0.71 K/UL (ref 0–0.85)
MONOCYTES NFR BLD AUTO: 8.5 % (ref 0–13.4)
NEUTROPHILS # BLD AUTO: 4.76 K/UL (ref 1.82–7.42)
NEUTROPHILS NFR BLD: 57.1 % (ref 44–72)
NRBC # BLD AUTO: 0 K/UL
NRBC BLD-RTO: 0 /100 WBC (ref 0–0.2)
PLATELET # BLD AUTO: 435 K/UL (ref 164–446)
PMV BLD AUTO: 10.2 FL (ref 9–12.9)
POTASSIUM SERPL-SCNC: 4.2 MMOL/L (ref 3.6–5.5)
PROT SERPL-MCNC: 7.8 G/DL (ref 6–8.2)
RBC # BLD AUTO: 4.65 M/UL (ref 4.2–5.4)
SODIUM SERPL-SCNC: 140 MMOL/L (ref 135–145)
T PALLIDUM AB SER QL IA: NORMAL
TRIGL SERPL-MCNC: 238 MG/DL (ref 0–149)
WBC # BLD AUTO: 8.3 K/UL (ref 4.8–10.8)

## 2024-01-04 PROCEDURE — 86780 TREPONEMA PALLIDUM: CPT | Mod: GA

## 2024-01-04 PROCEDURE — 80061 LIPID PANEL: CPT

## 2024-01-04 PROCEDURE — 82306 VITAMIN D 25 HYDROXY: CPT | Mod: GA

## 2024-01-04 PROCEDURE — G0475 HIV COMBINATION ASSAY: HCPCS | Mod: GA

## 2024-01-04 PROCEDURE — 80053 COMPREHEN METABOLIC PANEL: CPT

## 2024-01-04 PROCEDURE — 36415 COLL VENOUS BLD VENIPUNCTURE: CPT | Mod: GA

## 2024-01-04 PROCEDURE — 82570 ASSAY OF URINE CREATININE: CPT

## 2024-01-04 PROCEDURE — 82043 UR ALBUMIN QUANTITATIVE: CPT

## 2024-01-04 PROCEDURE — 85025 COMPLETE CBC W/AUTO DIFF WBC: CPT

## 2024-01-19 NOTE — PROGRESS NOTES
Chief Complaint:  Follow up labs, HTN     Last Seen:   2023    History of Present Illness:     Pt is a 73-year-old F with PMHx of:    Hypertension- losartan-HCTZ  Non-insulin-dependent diabetes mellitus, type II, well-controlled c/b peripheral neuropathy- metformin  Dyslipidemia, well controlled- atorvastatin  Hypertriglyceridemia  Hypothyroidism- levothyroxine   Gastroesophageal reflux disease  Osteopenia  Osteoarthritis  Overweight  History of complete rotator cuff tear     presenting today for lab follow-up and for hypertension.    Hypertension  Drinks 1 cup coffee daily and does not consume energy drinks or do any drugs. States home BP log with -120s.  Does note dizziness lasting a few seconds with positional changes including standing up from being seated.  States she only drinks 1 large water bottle water daily.  Gets dizzy with positional changes during yard work as well.    Hypothyroidism  Last checked . Needs recheck of this lab. States she takes her thyroid medication correctly and is adherent. Denies ROS sx.     Labs  HIV, CMP, vitamin D, urine microalbumin creatinine ratio, CBC largely unremarkable.  Lipid panel remarkable for triglyceride 238. A1c was 6.5 2 months ago. Thyroid studies not drawn. LDL<70.    ----    Exercise:   very active with gardening, walking    Diet:   low sugar diet. Well balanced diet with vegetables and fruits.     Healthcare maintenance  Mood screening: PHQ9 score 6 (2023)  Drug screening: none   Sexual activity screening: not sexually active   Infectious disease screening: hep C screening negative, HIV neg 2024  Colon cancer screening: Refuses colonoscopies. Occult blood negative 3/2023  Vaccines: COVID/ shingles in local pharmacy  Osteoporosis screenin dexa normal; re-ordered 2023  Breast cancer screenin mammography with heterogeneously dense breasts without malignancy. Mother had breast cancer.   Cervical cancer screening: s/p total  hysterectomy, no longer has cervix   Metabolic screening: a1c 1/2023, lipid panel 7/2022    Review of Systems   Review of Systems   Constitutional:  Negative for chills, fever and weight loss.   Respiratory:  Negative for cough and shortness of breath.    Cardiovascular:  Negative for chest pain, palpitations and leg swelling.   Gastrointestinal:  Negative for constipation, diarrhea, nausea and vomiting.   Genitourinary:  Negative for dysuria.   Musculoskeletal:  Negative for myalgias.   Neurological:  Negative for dizziness and weakness.   Psychiatric/Behavioral:  Negative for depression.       Past Medical History:   Past Medical History:   Diagnosis Date    Asthma     Diabetes     Heart burn     Hypertension     Hypothyroidism     Unspecified disorder of thyroid      Patient Active Problem List    Diagnosis Date Noted    GERD (gastroesophageal reflux disease) 11/13/2023    Impaired mobility and ADLs 11/13/2023    Cognitive decline 11/13/2023    Allergies 01/03/2023    Primary osteoarthritis of both knees 01/21/2022    Multiple thyroid nodules 04/08/2019    Osteopenia 05/30/2018    Type 2 diabetes mellitus with diabetic polyneuropathy, without long-term current use of insulin ([facility]) 05/02/2017    Essential hypertension 05/02/2017    Dyslipidemia 05/02/2017    Hypothyroidism 05/02/2017     Past Surgical History:   Past Surgical History:   Procedure Laterality Date    SCLERAL BUCKLING  2/12/2013    Performed by Shankar Estrada M.D. at [facility]    CHOLECYSTECTOMY          Allergies:  Patient has no known allergies.    Medications:     Current Outpatient Medications:     hydrocortisone, APPLY THIN LAYER OVER AFFECTED AREAS 3 TIMES PER DAY AS NEEDED TO CONTROL SYMPTOMS., Taking    diclofenac sodium, , Taking    losartan, 50 mg, Oral, DAILY    atorvastatin, TOME 1 TABLETA CADA TARDE, Taking    levothyroxine, 50 mcg, Oral, AM ES, Taking    metFORMIN ER, 1,000 mg, Oral, PM MEAL, Taking     cetirizine, TOME 1 TABLETA CADA WILSON, Taking     Social History:   Social History     Tobacco Use    Smoking status: Never    Smokeless tobacco: Never   Vaping Use    Vaping Use: Never used   Substance Use Topics    Alcohol use: No     Alcohol/week: 0.0 oz    Drug use: No     Family History:   Family History   Problem Relation Age of Onset    Prostate cancer Father     Heart Attack Mother     Diabetes Sister      Vitals:   BP (!) 145/82   Pulse 85   Temp 36.3 °C (97.4 °F) (Temporal)   Ht 1.524 m (5')   Wt 58.5 kg (129 lb)   SpO2 95%  Body mass index is 25.19 kg/m².    Physical Exam:   Physical Exam  Constitutional:       General: She is not in acute distress.     Appearance: Normal appearance. She is not ill-appearing or diaphoretic.   HENT:      Head: Normocephalic and atraumatic.      Mouth/Throat:      Mouth: Mucous membranes are moist.   Eyes:      Extraocular Movements: Extraocular movements intact.      Pupils: Pupils are equal, round, and reactive to light.   Cardiovascular:      Rate and Rhythm: Normal rate and regular rhythm.      Heart sounds: No murmur heard.     No friction rub. No gallop.   Pulmonary:      Effort: No respiratory distress.      Breath sounds: No stridor. No wheezing, rhonchi or rales.   Abdominal:      General: Bowel sounds are normal. There is no distension.      Tenderness: There is no abdominal tenderness. There is no guarding or rebound.   Musculoskeletal:      Right lower leg: No edema.      Left lower leg: No edema.   Skin:     Coloration: Skin is not jaundiced or pale.   Neurological:      General: No focal deficit present.      Mental Status: She is alert and oriented to person, place, and time.   Psychiatric:         Mood and Affect: Mood normal.         Behavior: Behavior normal.     Assessment and Plan    Pt is a 73-year-old F with PMHx of HTN, NIDDM (June 2022 a1c 7.4) c/b peripheral neuropathy,dyslipidmia, hypertriglyceridemia, hypothyroidism (last TSH 1.04 8/27/2020),  osteopenia, stroke x 3 (1986), osteoarthritis, h/o complete rotator cuff tear presenting today for lab / HTN follow-up:     #Hypertension, uncontrolled  In office  over 80s with repeat 140s over 80s.  PLAN  -Losartan 50 mg Po qdaily  -Disont Losartan-hydrochlorothiazide 50-12.5mg PO qdaily   -BMP with discontinuation of HCTZ  -Counseled on how to correctly check blood pressure at home  -Counseled to keep blood pressure log at home  -Counseled on stroke symptoms and to present to ED should these occur  -Follow-up in 2 weeks for titration of blood pressure medication with goal SBP less than 130    #Hypothyroidism  -TSH/ free t4 WNL July 2022-- pt adherent to levothyroxine and taking it correctly  PLAN  -Levothyroxine 50mcg PO daily  -Recheck TSH/ free T4     -- CHRONIC PROBLEMS --     #Diabetes mellitus, type 2, non-insulin dependent, well controlled  -POC A1c 6/10/22: 7.3 --> 8 1/3/23 --> 6.9 5/2023 --> 6.5 11/2023  -Monofilament exam 1/3/2023: R > L loss of sensation at feet w/o any wounds, vibratory sensation WNL  -Microalb/cr ratio WNL 1/2024  PLAN  -Metformin 1000 mg PO qdaily  -Podiatry referral placed 1/2023- information provided  -Follows with ophthalmology 4/2023  -Follows with dentist (last apt 1/2023)  -Counseled on importance of up to date vaccinations in setting of DM  -Counseled on importance of skin checks weekly for feet to avoid infections and identify nonhealing wounds     #Hypertriglyceridemia, moderate  #Dyslipidemia  -Lipid panel 1/2024 with ASCVD 45.1% due to uncontrolled HTN  -On Gemfibrozil/ ezetimibe in past- stopped it herself - does not like pills she states   PLAN   -Atorvastatin 40mg PO daily    #History of progressive memory loss  Patient with memory loss over the past 6 to 12 months notable by family members.  Workup thus far with normal B12, B1, B6, folate levels.  States she sleeps well.  No depression concern on depression screen. DDx includes mood disturbance in setting of  partner recently placed on hospice care versus dementia/Alzheimer's vs medication nonadherence leading to cognitive impairments vs normal aging. RPR/ HIV negative 2024.   PLAN  -Neurology referral for neuropsychiatric testing placed 2023--consideration for additional imaging such as MRI  -Home health referral, geriatric consult information provided 2023    #Gastroestophageal reflux disease  -Abd pain x 6 months , improved with ranitidine in past but she self-discontinued and then discomfort returned  -Significant ibuprofen use hx  **DDx: GERD vs PUD   PLAN  -Omeprazole 20 mg PO daily    #History of chest pain, musculoskeletal  Pain ongoing for at least 2 weeks after viral URI causing cough.  Located bilateral rib cage triggered by palpation 2023 visit    #Osteoarthritis, bilateral knees     #Healthcare maintenance  Mood screening: PHQ9 score 6 (2023)  Drug screening: none   Sexual activity screening: not sexually active   Infectious disease screening: hep C screening negative, HIV neg 2024  Colon cancer screening: Refuses colonoscopies. Occult blood negative 3/2023  Vaccines: COVID/ shingles in local pharmacy  Osteoporosis screenin dexa normal  Breast cancer screenin mammography with heterogeneously dense breasts without malignancy. Mother had breast cancer.   Cervical cancer screening: s/p total hysterectomy, no longer has cervix   Metabolic screening: a1c 2023, lipid panel 2022     Please note that this dictation was created using voice recognition software. I have made every reasonable attempt to correct obvious errors, but I expect that there are errors of grammar and possibly content that I did not discover before finalizing the note.     Patient case was seen/ assessed/ discussed with Dr. Angulo.     Ant Parson, PGY-3  Internal Medicine

## 2024-01-22 ENCOUNTER — OFFICE VISIT (OUTPATIENT)
Dept: INTERNAL MEDICINE | Facility: OTHER | Age: 74
End: 2024-01-22
Payer: MEDICARE

## 2024-01-22 VITALS
BODY MASS INDEX: 25.32 KG/M2 | OXYGEN SATURATION: 95 % | TEMPERATURE: 97.4 F | HEART RATE: 85 BPM | HEIGHT: 60 IN | SYSTOLIC BLOOD PRESSURE: 145 MMHG | WEIGHT: 129 LBS | DIASTOLIC BLOOD PRESSURE: 82 MMHG

## 2024-01-22 DIAGNOSIS — E03.9 HYPOTHYROIDISM, UNSPECIFIED TYPE: ICD-10-CM

## 2024-01-22 DIAGNOSIS — I10 PRIMARY HYPERTENSION: ICD-10-CM

## 2024-01-22 PROBLEM — J45.30 MILD PERSISTENT ASTHMA WITHOUT COMPLICATION: Status: RESOLVED | Noted: 2019-01-04 | Resolved: 2024-01-22

## 2024-01-22 PROBLEM — J31.0 CHRONIC RHINITIS: Status: RESOLVED | Noted: 2020-01-14 | Resolved: 2024-01-22

## 2024-01-22 PROCEDURE — 99214 OFFICE O/P EST MOD 30 MIN: CPT | Mod: GC | Performed by: STUDENT IN AN ORGANIZED HEALTH CARE EDUCATION/TRAINING PROGRAM

## 2024-01-22 PROCEDURE — 3079F DIAST BP 80-89 MM HG: CPT | Mod: GC | Performed by: STUDENT IN AN ORGANIZED HEALTH CARE EDUCATION/TRAINING PROGRAM

## 2024-01-22 PROCEDURE — 3077F SYST BP >= 140 MM HG: CPT | Mod: GC | Performed by: STUDENT IN AN ORGANIZED HEALTH CARE EDUCATION/TRAINING PROGRAM

## 2024-01-22 RX ORDER — FAMOTIDINE 20 MG/1
TABLET, FILM COATED ORAL
COMMUNITY
End: 2024-01-22

## 2024-01-22 RX ORDER — LOSARTAN POTASSIUM 50 MG/1
50 TABLET ORAL DAILY
Qty: 40 TABLET | Refills: 0 | Status: SHIPPED | OUTPATIENT
Start: 2024-01-22 | End: 2024-02-27

## 2024-01-22 ASSESSMENT — ENCOUNTER SYMPTOMS
PALPITATIONS: 0
CHILLS: 0
DIARRHEA: 0
NAUSEA: 0
MYALGIAS: 0
VOMITING: 0
DIZZINESS: 0
FEVER: 0
WEIGHT LOSS: 0
SHORTNESS OF BREATH: 0
WEAKNESS: 0
DEPRESSION: 0
COUGH: 0
CONSTIPATION: 0

## 2024-01-22 ASSESSMENT — PATIENT HEALTH QUESTIONNAIRE - PHQ9: CLINICAL INTERPRETATION OF PHQ2 SCORE: 0

## 2024-01-22 ASSESSMENT — FIBROSIS 4 INDEX: FIB4 SCORE: 0.83

## 2024-01-22 NOTE — PROGRESS NOTES
Teaching Physician Attestation      Level of Participation    I have personally interviewed and examined the patient.  In addition, I discussed with the resident physician the patient's history, exam, assessment and plan in detail.  Topics listed in my addendum were the focus of the visit.  Healthcare maintenance was not addressed this visit unless listed as a topic in my addendum.  I agree with the plan as written along with the following additions/modifications:    HTN  -no sx of endorgan damage.  Home blood pressures reported as much lower, is also having some mild lightheadedness lasting a few seconds with positional changes.  -Will discontinue thiazide diuretic, continue losartan, check BMP within 1 week, follow-up within 2 weeks, bring home log    Hypothyroidism  -no sx, no thyromegaly.  -cont levo 50 mcg daily.  Encouraged thyroid lab draw    Return to clinic within 4 weeks, needs dedicated diabetes follow-up.  Also needs memory loss f/u on chart review (noted hiv, b12, rpr, k, ca nl on recent labs - alert and responding appropriately in exam room today, not otherwise addressed as focus was on above topics, appears neurocognitive testing pending)).  pcr

## 2024-01-25 DIAGNOSIS — R41.89 COGNITIVE DECLINE: ICD-10-CM

## 2024-02-05 ENCOUNTER — TELEPHONE (OUTPATIENT)
Dept: INTERNAL MEDICINE | Facility: OTHER | Age: 74
End: 2024-02-05

## 2024-02-05 ENCOUNTER — APPOINTMENT (OUTPATIENT)
Dept: INTERNAL MEDICINE | Facility: OTHER | Age: 74
End: 2024-02-05
Payer: MEDICARE

## 2024-02-05 NOTE — TELEPHONE ENCOUNTER
Daughter in law called she stated she called NEUROLOGY  to make an appointment and was told they do not take medicare nor medicaid insurance. She would like another referral place to a location that accepts her insurance.

## 2024-02-06 DIAGNOSIS — R41.89 COGNITIVE DECLINE: ICD-10-CM

## 2024-02-06 NOTE — TELEPHONE ENCOUNTER
Phone Number Called: [phone] (home)       Call outcome: Left detailed message for patient. Informed to call back with any additional questions.    Message: Pt was informed of referral being moved. Closing enc

## 2024-02-12 ENCOUNTER — TELEPHONE (OUTPATIENT)
Dept: HEALTH INFORMATION MANAGEMENT | Facility: OTHER | Age: 74
End: 2024-02-12
Payer: MEDICARE

## 2024-02-21 ENCOUNTER — TELEPHONE (OUTPATIENT)
Dept: INTERNAL MEDICINE | Facility: OTHER | Age: 74
End: 2024-02-21
Payer: MEDICARE

## 2024-02-21 NOTE — LETTER
To whom it may concern,    We have been unable to contact your regarding your losartan medication. Dr. Pasron's message regarding losartan is as follows:     Sony Castaneda,     I got your message from one of our medical assistant's. Please check your blood pressure at home. If it is low (less than 110 for the top number) then please split the losartan pill in half. If this still causes low blood pressure you may stop the medication all together.      Additionally, you have been on this medication for quite some time so it is less likely it is what is causing your symptoms. Please make sure you are hydrating appropriately (at least 70 oz water daily).     Best,  Dr Parson    For more questions or concerns, please call [phone]    Thanks,  Yuko Canas, Med Ass't

## 2024-02-22 NOTE — TELEPHONE ENCOUNTER
Per Dr. Parson:    Attempted to call patient x 2 with . Sent her a [employer] message.    Thanks.

## 2024-02-22 NOTE — TELEPHONE ENCOUNTER
VOICEMAIL  1. Caller Name: Griffin (home health nurse)                      Call Back Number: [phone]    2. Message: LVM patient is taking losartan, causing light headiness and dizziness with headache. Patient has upcoming appointment     3. Patient approves office to leave a detailed voicemail/MyChart message: N\A

## 2024-02-26 ENCOUNTER — APPOINTMENT (OUTPATIENT)
Dept: INTERNAL MEDICINE | Facility: OTHER | Age: 74
End: 2024-02-26
Payer: MEDICARE

## 2024-02-26 ASSESSMENT — ENCOUNTER SYMPTOMS
PALPITATIONS: 0
SHORTNESS OF BREATH: 0
WEIGHT LOSS: 0
DIARRHEA: 0
FEVER: 0
VOMITING: 0
CONSTIPATION: 0
WEAKNESS: 0
CHILLS: 0
COUGH: 0
DEPRESSION: 0
MYALGIAS: 0
NAUSEA: 0

## 2024-02-26 NOTE — PROGRESS NOTES
Chief Complaint:  Follow up labs, HTN     Last Seen:   2024    History of Present Illness:     Pt is a 73-year-old F with PMHx of:    Hypertension- losartan-HCTZ  Non-insulin-dependent diabetes mellitus, type II, well-controlled c/b peripheral neuropathy- metformin  Dyslipidemia, well controlled- atorvastatin  Hypertriglyceridemia  Hypothyroidism- levothyroxine   Gastroesophageal reflux disease  Osteopenia  Osteoarthritis  Overweight  History of complete rotator cuff tear     presenting today for lab follow-up and for hypertension.    Hypertension  Drinks 1 cup coffee daily and does not consume energy drinks or do any drugs. States home BP log with -120s.  Does note dizziness lasting a few seconds with positional changes including standing up from being seated.  Gets dizzy with positional changes during yard work as well. States she drinks 1 liter water daily. Denies stroke sx at this time.    Hypothyroidism  Last checked . Needs recheck of this lab. States she takes her thyroid medication correctly and is adherent. Denies ROS sx.     Labs  Did not obtain repeat CMP or thyroid labs.     ----    Exercise:   very active with gardening, walking    Diet:   low sugar diet. Well balanced diet with vegetables and fruits.     Healthcare maintenance-- needs to be addressed at future visit  Mood screening: PHQ9 score 6 (2023)  Drug screening: none   Sexual activity screening: not sexually active   Infectious disease screening: hep C screening negative, HIV neg 2024  Colon cancer screening: Refuses colonoscopies. Occult blood negative 3/2023  Vaccines: COVID/ shingles in local pharmacy  Osteoporosis screenin dexa normal; re-ordered 2023  Breast cancer screenin mammography with heterogeneously dense breasts without malignancy. Mother had breast cancer.   Cervical cancer screening: s/p total hysterectomy, no longer has cervix   Metabolic screening: a1c 2023, lipid panel 2022    Review of  Systems   Review of Systems   Constitutional:  Negative for chills, fever and weight loss.   HENT:  Negative for hearing loss, sore throat and tinnitus.    Eyes:  Negative for pain, discharge and redness.   Respiratory:  Negative for cough and shortness of breath.    Cardiovascular:  Positive for chest pain (costochondral). Negative for palpitations and leg swelling.   Gastrointestinal:  Negative for constipation, diarrhea, nausea and vomiting.   Genitourinary:  Negative for dysuria.   Musculoskeletal:  Negative for myalgias.   Neurological:  Positive for dizziness and headaches. Negative for tingling, tremors, sensory change, speech change, focal weakness, seizures, loss of consciousness and weakness.   Psychiatric/Behavioral:  Negative for depression.    All other systems reviewed and are negative.     Past Medical History:   Past Medical History:   Diagnosis Date    Asthma     Diabetes     Heart burn     Hypertension     Hypothyroidism     Unspecified disorder of thyroid      Patient Active Problem List    Diagnosis Date Noted    GERD (gastroesophageal reflux disease) 11/13/2023    Impaired mobility and ADLs 11/13/2023    Cognitive decline 11/13/2023    Allergies 01/03/2023    Primary osteoarthritis of both knees 01/21/2022    Multiple thyroid nodules 04/08/2019    Osteopenia 05/30/2018    Type 2 diabetes mellitus with diabetic polyneuropathy, without long-term current use of insulin ([facility]) 05/02/2017    Essential hypertension 05/02/2017    Dyslipidemia 05/02/2017    Hypothyroidism 05/02/2017     Past Surgical History:   Past Surgical History:   Procedure Laterality Date    SCLERAL BUCKLING  2/12/2013    Performed by Shankar Estrada M.D. at SURGERY SAME DAY [facility] ORS    CHOLECYSTECTOMY          Allergies:  Patient has no known allergies.    Medications:     Current Outpatient Medications:     losartan, 25 mg, Oral, DAILY    metFORMIN ER, 500 mg, Oral, DAILY    hydrocortisone, APPLY THIN LAYER OVER  AFFECTED AREAS 3 TIMES PER DAY AS NEEDED TO CONTROL SYMPTOMS., Taking    diclofenac sodium, , Taking    atorvastatin, TOME 1 TABLETA CADA TARDE, Taking    levothyroxine, 50 mcg, Oral, AM ES, Taking    cetirizine, TOME 1 TABLETA CADA WLISON, Taking     Social History:   Social History     Tobacco Use    Smoking status: Never    Smokeless tobacco: Never   Vaping Use    Vaping Use: Never used   Substance Use Topics    Alcohol use: No     Alcohol/week: 0.0 oz    Drug use: No     Family History:   Family History   Problem Relation Age of Onset    Prostate cancer Father     Heart Attack Mother     Diabetes Sister      Vitals:   /64 (BP Location: Right arm, Patient Position: Sitting, BP Cuff Size: Adult)   Pulse 68   Temp 36 °C (96.8 °F) (Temporal)   Ht 1.524 m (5')   Wt 59.8 kg (131 lb 12.8 oz)   SpO2 94%  Body mass index is 25.74 kg/m².    Physical Exam:   Physical Exam  Constitutional:       General: She is not in acute distress.     Appearance: Normal appearance. She is not ill-appearing or diaphoretic.   HENT:      Head: Normocephalic and atraumatic.      Mouth/Throat:      Mouth: Mucous membranes are moist.   Eyes:      Extraocular Movements: Extraocular movements intact.      Pupils: Pupils are equal, round, and reactive to light.   Cardiovascular:      Rate and Rhythm: Normal rate and regular rhythm.      Heart sounds: No murmur heard.     No friction rub. No gallop.   Pulmonary:      Effort: No respiratory distress.      Breath sounds: No stridor. No wheezing, rhonchi or rales.   Abdominal:      General: Bowel sounds are normal. There is no distension.      Tenderness: There is no abdominal tenderness. There is no guarding or rebound.   Musculoskeletal:      Right lower leg: No edema.      Left lower leg: No edema.   Skin:     Coloration: Skin is not jaundiced or pale.   Neurological:      General: No focal deficit present.      Mental Status: She is alert and oriented to person, place, and time.       Comments: Able to elicit dizziness with patient actively turning her head 90 degrees in either direction, however renee roach pike is negative  Orthostats neg   Psychiatric:         Mood and Affect: Mood normal.         Behavior: Behavior normal.     Assessment and Plan    Pt is a 73-year-old F with PMHx of HTN, NIDDM (June 2022 a1c 7.4) c/b peripheral neuropathy, recent cognitive decline in 2023, dyslipidemia, hypertriglyceridemia, hypothyroidism (last TSH 1.04 8/27/2020), osteopenia, ?stroke x 3 (1986), osteoarthritis, h/o complete rotator cuff tear presenting today for lab / HTN follow-up:     #Hypertension, uncontrolled  #Vertigo  In office  over 80s with repeat 140s over 80s.  Vertigo appears to be positional and lasting few seconds.  Able solicitous on exam when patient is seated and turns her neck 90 degrees in either direction.  B[place]-Hallpike however is negative.  In an effort to rule out other causes in the setting of patient's recent cognitive decline throughout the past year reasonable to obtain a carotid ultrasound to ensure there is no carotid artery stenosis causing her symptomology.  Also reasonable to continue to decrease antihypertensive therapies and reduce polypharmacy.   PLAN  -Losartan 50 mg Po qdaily--decreased to 25 mg p.o. daily  -Carotid ultrasound  -Physical therapy referral for vestibular therapy  -Counseled on how to correctly check blood pressure at home  -Counseled to keep blood pressure log at home  -Counseled on stroke symptoms and to present to ED should these occur  -Follow-up in 5 weeks     #Diabetes mellitus, type 2, non-insulin dependent, well controlled  -POC A1c 6/10/22: 7.3 --> 8 1/3/23 --> 6.9 5/2023 --> 6.5 11/2023 --> 6.3 2/2024  -Monofilament exam 1/3/2023: R > L loss of sensation at feet w/o any wounds, vibratory sensation WNL  -Microalb/cr ratio WNL 1/2024  PLAN  -Metformin 1000 mg PO qdaily--decreased to 500 mg p.o. daily extended release  -POC A1c  -Podiatry referral  placed 1/2023- information provided  -Follows with ophthalmology 4/2023  -Follows with dentist (last apt 1/2023)  -Counseled on importance of up to date vaccinations in setting of DM  -Counseled on importance of skin checks weekly for feet to avoid infections and identify nonhealing wounds     #Hypothyroidism  -TSH/ free t4 WNL July 2022-- pt adherent to levothyroxine and taking it correctly  PLAN  -Levothyroxine 50mcg PO daily  -Counseled to make levothyroxine on empty stomach with water without any other medications and 1 hour prior to food (previously taking this with other medications)  -Recheck TSH/ free T4     -- CHRONIC PROBLEMS --     #History of progressive memory loss  Patient with memory loss over the past 6 to 12 months notable by family members.  Workup thus far with normal B12, B1, B6, folate levels.  States she sleeps well.  No depression concern on depression screen. DDx includes mood disturbance in setting of partner recently placed on hospice care versus dementia/Alzheimer's vs medication nonadherence leading to cognitive impairments vs normal aging. RPR/ HIV negative 1/2024.   PLAN  -Neurology referral for neuropsychiatric testing placed 12/2023--consideration for additional imaging such as MRI-- scheduled for mid July  -Home health referral, geriatric consult information provided 12/2023    #Hypertriglyceridemia, moderate  #Dyslipidemia  -Lipid panel 1/2024 with ASCVD 45.1% due to uncontrolled HTN  -On Gemfibrozil/ ezetimibe in past- stopped it herself - does not like pills she states   PLAN   -Atorvastatin 40mg PO daily    #Gastroestophageal reflux disease  -Abd pain x 6 months 2022, improved with ranitidine in past but she self-discontinued and then discomfort returned  -Significant ibuprofen use hx  **DDx: GERD vs PUD   PLAN  -Omeprazole 20 mg PO daily    #History of chest pain, musculoskeletal  Pain ongoing for at least 2 weeks after viral URI causing cough.  Located bilateral rib cage  triggered by palpation 2023 visit    #Osteoarthritis, bilateral knees     #Healthcare maintenance  Mood screening: PHQ9 score 6 (2023)  Drug screening: none   Sexual activity screening: not sexually active   Infectious disease screening: hep C screening negative, HIV neg 2024  Colon cancer screening: Refuses colonoscopies. Occult blood negative 3/2023  Vaccines: COVID/ shingles in local pharmacy  Osteoporosis screenin dexa normal  Breast cancer screenin mammography with heterogeneously dense breasts without malignancy. Mother had breast cancer.   Cervical cancer screening: s/p total hysterectomy, no longer has cervix   Metabolic screening: a1c 2023, lipid panel 2022     Please note that this dictation was created using voice recognition software. I have made every reasonable attempt to correct obvious errors, but I expect that there are errors of grammar and possibly content that I did not discover before finalizing the note.     Patient case was seen/ assessed/ discussed with the attending physician.     Ant Parson, PGY-3  Internal Medicine

## 2024-02-27 ENCOUNTER — TELEPHONE (OUTPATIENT)
Dept: INTERNAL MEDICINE | Facility: OTHER | Age: 74
End: 2024-02-27

## 2024-02-27 ENCOUNTER — OFFICE VISIT (OUTPATIENT)
Dept: INTERNAL MEDICINE | Facility: OTHER | Age: 74
End: 2024-02-27
Payer: MEDICARE

## 2024-02-27 VITALS
TEMPERATURE: 96.8 F | HEIGHT: 60 IN | BODY MASS INDEX: 25.87 KG/M2 | WEIGHT: 131.8 LBS | SYSTOLIC BLOOD PRESSURE: 125 MMHG | DIASTOLIC BLOOD PRESSURE: 64 MMHG | HEART RATE: 68 BPM | OXYGEN SATURATION: 94 %

## 2024-02-27 DIAGNOSIS — I10 PRIMARY HYPERTENSION: ICD-10-CM

## 2024-02-27 DIAGNOSIS — R42 VERTIGO: ICD-10-CM

## 2024-02-27 DIAGNOSIS — R41.89 COGNITIVE DECLINE: ICD-10-CM

## 2024-02-27 DIAGNOSIS — E11.42 TYPE 2 DIABETES MELLITUS WITH DIABETIC POLYNEUROPATHY, WITHOUT LONG-TERM CURRENT USE OF INSULIN (HCC): ICD-10-CM

## 2024-02-27 LAB
HBA1C MFR BLD: 6.3 % (ref ?–5.8)
POCT INT CON NEG: NEGATIVE
POCT INT CON POS: POSITIVE

## 2024-02-27 PROCEDURE — 3074F SYST BP LT 130 MM HG: CPT | Performed by: STUDENT IN AN ORGANIZED HEALTH CARE EDUCATION/TRAINING PROGRAM

## 2024-02-27 PROCEDURE — 99213 OFFICE O/P EST LOW 20 MIN: CPT | Mod: GE | Performed by: STUDENT IN AN ORGANIZED HEALTH CARE EDUCATION/TRAINING PROGRAM

## 2024-02-27 PROCEDURE — 1126F AMNT PAIN NOTED NONE PRSNT: CPT | Performed by: STUDENT IN AN ORGANIZED HEALTH CARE EDUCATION/TRAINING PROGRAM

## 2024-02-27 PROCEDURE — 3078F DIAST BP <80 MM HG: CPT | Performed by: STUDENT IN AN ORGANIZED HEALTH CARE EDUCATION/TRAINING PROGRAM

## 2024-02-27 PROCEDURE — 83036 HEMOGLOBIN GLYCOSYLATED A1C: CPT | Performed by: STUDENT IN AN ORGANIZED HEALTH CARE EDUCATION/TRAINING PROGRAM

## 2024-02-27 RX ORDER — METFORMIN HYDROCHLORIDE 500 MG/1
500 TABLET, EXTENDED RELEASE ORAL DAILY
Qty: 90 TABLET | Refills: 1 | Status: SHIPPED | OUTPATIENT
Start: 2024-02-27 | End: 2024-03-22 | Stop reason: SDUPTHER

## 2024-02-27 RX ORDER — LOSARTAN POTASSIUM 25 MG/1
25 TABLET ORAL DAILY
Qty: 90 TABLET | Refills: 0 | Status: SHIPPED | OUTPATIENT
Start: 2024-02-27 | End: 2024-03-22 | Stop reason: SDUPTHER

## 2024-02-27 ASSESSMENT — ENCOUNTER SYMPTOMS
EYE REDNESS: 0
EYE PAIN: 0
SENSORY CHANGE: 0
LOSS OF CONSCIOUSNESS: 0
DIZZINESS: 1
EYE DISCHARGE: 0
TINGLING: 0
TREMORS: 0
SEIZURES: 0
SPEECH CHANGE: 0
SORE THROAT: 0
FOCAL WEAKNESS: 0
HEADACHES: 1

## 2024-02-27 ASSESSMENT — FIBROSIS 4 INDEX: FIB4 SCORE: 0.83

## 2024-02-27 ASSESSMENT — PAIN SCALES - GENERAL: PAINLEVEL: NO PAIN

## 2024-02-27 NOTE — TELEPHONE ENCOUNTER
Patient's daughter-n-law called and left a vm on the front machine this morning stating Tonya was seen this morning by Dr Parson and she wanted to give Dr Parson a little background update on paitent.  Things she has noticed.  She is asking for a call back at [phone]

## 2024-02-27 NOTE — PATIENT INSTRUCTIONS
Hace britany al en isi semanas  Veronica 25mg de losartan (mas baja)   Veronica 500mg de metformina (mas baja)  Hace un ultrasonido carotideo   Hace therapia vestibular

## 2024-02-29 NOTE — TELEPHONE ENCOUNTER
Phone Number Called: [phone]     Call outcome:  Spoke to pt's daughter in law     Message: Pt stated that she already spoke with Dr. Parson and nothing else was needed at this time. Closing enc

## 2024-03-22 DIAGNOSIS — E03.9 HYPOTHYROIDISM, UNSPECIFIED TYPE: ICD-10-CM

## 2024-03-22 DIAGNOSIS — E11.42 TYPE 2 DIABETES MELLITUS WITH DIABETIC POLYNEUROPATHY, WITHOUT LONG-TERM CURRENT USE OF INSULIN (HCC): ICD-10-CM

## 2024-03-22 DIAGNOSIS — I10 PRIMARY HYPERTENSION: ICD-10-CM

## 2024-03-22 DIAGNOSIS — E78.5 DYSLIPIDEMIA: ICD-10-CM

## 2024-03-22 RX ORDER — METFORMIN HYDROCHLORIDE 500 MG/1
500 TABLET, EXTENDED RELEASE ORAL DAILY
Qty: 90 TABLET | Refills: 1 | Status: SHIPPED | OUTPATIENT
Start: 2024-03-22 | End: 2024-03-28 | Stop reason: SDUPTHER

## 2024-03-22 RX ORDER — ATORVASTATIN CALCIUM 40 MG/1
TABLET, FILM COATED ORAL
Qty: 90 TABLET | Refills: 1 | Status: SHIPPED | OUTPATIENT
Start: 2024-03-22 | End: 2024-03-28 | Stop reason: SDUPTHER

## 2024-03-22 RX ORDER — LEVOTHYROXINE SODIUM 0.05 MG/1
50 TABLET ORAL
Qty: 90 TABLET | Refills: 3 | Status: SHIPPED | OUTPATIENT
Start: 2024-03-22 | End: 2024-03-28 | Stop reason: SDUPTHER

## 2024-03-22 RX ORDER — LOSARTAN POTASSIUM 25 MG/1
25 TABLET ORAL DAILY
Qty: 90 TABLET | Refills: 0 | Status: SHIPPED | OUTPATIENT
Start: 2024-03-22 | End: 2024-03-28 | Stop reason: SDUPTHER

## 2024-03-22 NOTE — TELEPHONE ENCOUNTER
Received request via: Pharmacy    Was the patient seen in the last year in this department? Yes    Does the patient have an active prescription (recently filled or refills available) for medication(s) requested? No    Pharmacy Name: [facility]    Does the patient have detention Plus and need 100 day supply (blood pressure, diabetes and cholesterol meds only)? Patient does not have SCP

## 2024-03-28 DIAGNOSIS — I10 PRIMARY HYPERTENSION: ICD-10-CM

## 2024-03-28 DIAGNOSIS — E11.42 TYPE 2 DIABETES MELLITUS WITH DIABETIC POLYNEUROPATHY, WITHOUT LONG-TERM CURRENT USE OF INSULIN (HCC): ICD-10-CM

## 2024-03-28 DIAGNOSIS — E03.9 HYPOTHYROIDISM, UNSPECIFIED TYPE: ICD-10-CM

## 2024-03-28 DIAGNOSIS — E78.5 DYSLIPIDEMIA: ICD-10-CM

## 2024-03-28 DIAGNOSIS — T78.40XA ALLERGY, INITIAL ENCOUNTER: ICD-10-CM

## 2024-03-28 RX ORDER — LEVOTHYROXINE SODIUM 0.05 MG/1
50 TABLET ORAL
Qty: 90 TABLET | Refills: 3 | Status: SHIPPED | OUTPATIENT
Start: 2024-03-28

## 2024-03-28 RX ORDER — CETIRIZINE HYDROCHLORIDE 10 MG/1
10 TABLET ORAL
Qty: 90 TABLET | Refills: 1 | Status: SHIPPED | OUTPATIENT
Start: 2024-03-28 | End: 2024-06-26

## 2024-03-28 RX ORDER — METFORMIN HYDROCHLORIDE 500 MG/1
500 TABLET, EXTENDED RELEASE ORAL DAILY
Qty: 90 TABLET | Refills: 1 | Status: SHIPPED | OUTPATIENT
Start: 2024-03-28 | End: 2024-09-24

## 2024-03-28 RX ORDER — ATORVASTATIN CALCIUM 40 MG/1
TABLET, FILM COATED ORAL
Qty: 90 TABLET | Refills: 1 | Status: SHIPPED | OUTPATIENT
Start: 2024-03-28

## 2024-03-28 RX ORDER — LOSARTAN POTASSIUM 25 MG/1
25 TABLET ORAL DAILY
Qty: 90 TABLET | Refills: 0 | Status: SHIPPED | OUTPATIENT
Start: 2024-03-28 | End: 2024-06-26

## 2024-03-28 NOTE — TELEPHONE ENCOUNTER
Daughter in law called clinic, needs Medications sent to [facility] (pharmacy has been updated) Patient requested medication 3/22/2024 and were sent to [facility]. Insurance is no longer contracted with insurance. Patient is out of medications and blood pressure is high due to not taking medicaitons. Please send medications urgently.

## 2024-04-11 DIAGNOSIS — I10 PRIMARY HYPERTENSION: ICD-10-CM

## 2024-04-11 DIAGNOSIS — E03.9 HYPOTHYROIDISM, UNSPECIFIED TYPE: ICD-10-CM

## 2024-04-11 DIAGNOSIS — E78.5 DYSLIPIDEMIA: ICD-10-CM

## 2024-04-11 RX ORDER — LOSARTAN POTASSIUM 25 MG/1
25 TABLET ORAL DAILY
Qty: 90 TABLET | Refills: 0 | Status: SHIPPED | OUTPATIENT
Start: 2024-04-11 | End: 2024-07-10

## 2024-04-11 RX ORDER — LEVOTHYROXINE SODIUM 0.05 MG/1
50 TABLET ORAL
Qty: 90 TABLET | Refills: 3 | Status: SHIPPED | OUTPATIENT
Start: 2024-04-11

## 2024-04-11 RX ORDER — ATORVASTATIN CALCIUM 40 MG/1
TABLET, FILM COATED ORAL
Qty: 90 TABLET | Refills: 1 | Status: SHIPPED | OUTPATIENT
Start: 2024-04-11

## 2024-04-11 NOTE — TELEPHONE ENCOUNTER
Received request via: Pharmacy patient requesting mail pharmacy    Was the patient seen in the last year in this department? Yes    Does the patient have an active prescription (recently filled or refills available) for medication(s) requested?  yes    Pharmacy Name: [facility]    Does the patient have long-term Plus and need 100 day supply (blood pressure, diabetes and cholesterol meds only)? Patient does not have SCP

## 2024-04-17 ENCOUNTER — HOSPITAL ENCOUNTER (OUTPATIENT)
Dept: RADIOLOGY | Facility: MEDICAL CENTER | Age: 74
End: 2024-04-17
Attending: STUDENT IN AN ORGANIZED HEALTH CARE EDUCATION/TRAINING PROGRAM
Payer: MEDICARE

## 2024-04-17 DIAGNOSIS — R41.89 COGNITIVE DECLINE: ICD-10-CM

## 2024-04-17 DIAGNOSIS — R42 VERTIGO: ICD-10-CM

## 2024-04-17 PROCEDURE — 93880 EXTRACRANIAL BILAT STUDY: CPT

## 2024-05-23 ENCOUNTER — OFFICE VISIT (OUTPATIENT)
Dept: INTERNAL MEDICINE | Facility: OTHER | Age: 74
End: 2024-05-23
Payer: MEDICARE

## 2024-05-23 VITALS
HEIGHT: 61 IN | WEIGHT: 144.6 LBS | HEART RATE: 75 BPM | SYSTOLIC BLOOD PRESSURE: 140 MMHG | DIASTOLIC BLOOD PRESSURE: 70 MMHG | BODY MASS INDEX: 27.3 KG/M2 | TEMPERATURE: 97.5 F | OXYGEN SATURATION: 95 %

## 2024-05-23 DIAGNOSIS — R10.31 RLQ ABDOMINAL PAIN: ICD-10-CM

## 2024-05-23 DIAGNOSIS — Z13.228 SCREENING FOR METABOLIC DISORDER: ICD-10-CM

## 2024-05-23 DIAGNOSIS — M25.562 CHRONIC PAIN OF BOTH KNEES: ICD-10-CM

## 2024-05-23 DIAGNOSIS — G89.29 CHRONIC PAIN OF BOTH KNEES: ICD-10-CM

## 2024-05-23 DIAGNOSIS — E11.42 TYPE 2 DIABETES MELLITUS WITH DIABETIC POLYNEUROPATHY, WITHOUT LONG-TERM CURRENT USE OF INSULIN (HCC): ICD-10-CM

## 2024-05-23 DIAGNOSIS — M25.561 CHRONIC PAIN OF BOTH KNEES: ICD-10-CM

## 2024-05-23 DIAGNOSIS — M85.80 OSTEOPENIA, UNSPECIFIED LOCATION: ICD-10-CM

## 2024-05-23 DIAGNOSIS — E03.9 HYPOTHYROIDISM, UNSPECIFIED TYPE: ICD-10-CM

## 2024-05-23 PROCEDURE — 3078F DIAST BP <80 MM HG: CPT | Mod: GC

## 2024-05-23 PROCEDURE — 3077F SYST BP >= 140 MM HG: CPT | Mod: GC

## 2024-05-23 PROCEDURE — 99214 OFFICE O/P EST MOD 30 MIN: CPT | Mod: GC

## 2024-05-23 ASSESSMENT — ENCOUNTER SYMPTOMS
CHILLS: 0
SHORTNESS OF BREATH: 0
ABDOMINAL PAIN: 1
DIZZINESS: 0
BACK PAIN: 0
NAUSEA: 0
MYALGIAS: 0
PALPITATIONS: 0
COUGH: 0
VOMITING: 0
FEVER: 0
SORE THROAT: 0
HEADACHES: 0

## 2024-05-23 ASSESSMENT — FIBROSIS 4 INDEX: FIB4 SCORE: 0.84

## 2024-05-23 NOTE — PROGRESS NOTES
Chief Complaint: Bilateral knee pain    Last Seen: 2/27/2024    History of Present Illness:   Tonya Estes is a 74 y.o. female with PMH relevant for hypertension, type 2 diabetes, dyslipidemia, hypothyroidism, GERD, osteoarthritis who presents with bilateral knee pain.  Patient is [place]-speaking, so most of history is obtained from her children.  Per children, patient has been having bilateral knee pain for many years.  They have noticed recently that she has been having difficulty walking.  Patient notes that her knees sometimes feel like they are giving out.  She denies any recent falls.  The pain is located bilaterally on her kneecaps and does not radiate.    Patient also reports right lower quadrant pain for the past couple of weeks.  She describes it as a constant mild nonradiating pain.  She denies any fevers, chills, nausea, vomiting, unusual weight loss, diarrhea, or constipation.  She does not notice any exacerbating factors.    Past Medical History:   Diagnosis Date    Asthma     Diabetes     Heart burn     Hypertension     Hypothyroidism     Unspecified disorder of thyroid        Past Surgical History:   Procedure Laterality Date    SCLERAL BUCKLING  2/12/2013    Performed by Shankar Estrada M.D. at SURGERY SAME DAY [facility] ORS    CHOLECYSTECTOMY         Family History   Problem Relation Age of Onset    Prostate cancer Father     Heart Attack Mother     Diabetes Sister        Social History     Socioeconomic History    Marital status:      Spouse name: Not on file    Number of children: Not on file    Years of education: Not on file    Highest education level: Not on file   Occupational History    Not on file   Tobacco Use    Smoking status: Never    Smokeless tobacco: Never   Vaping Use    Vaping status: Never Used   Substance and Sexual Activity    Alcohol use: No     Alcohol/week: 0.0 oz    Drug use: No    Sexual activity: Not on file   Other Topics Concern    Not on file   Social  History Narrative    Not on file     Social Determinants of Health     Financial Resource Strain: Not on file   Food Insecurity: Not on file   Transportation Needs: Not on file   Physical Activity: Not on file   Stress: Not on file   Social Connections: Not on file   Intimate Partner Violence: Not on file   Housing Stability: Not on file       Current Outpatient Medications   Medication Sig Dispense Refill    atorvastatin (LIPITOR) 40 MG Tab TOME 1 TABLETA CADA TARDE 90 Tablet 1    levothyroxine (SYNTHROID) 50 MCG Tab Take 1 Tablet by mouth every morning on an empty stomach. 90 Tablet 3    losartan (COZAAR) 25 MG Tab Take 1 Tablet by mouth every day for 90 days. 90 Tablet 0    metFORMIN ER (GLUCOPHAGE XR) 500 MG TABLET SR 24 HR Take 1 Tablet by mouth every day for 180 days. 90 Tablet 1    cetirizine (ZYRTEC) 10 MG Tab Take 1 Tablet by mouth 1 time a day as needed for Allergies for up to 90 days. 90 Tablet 1     No current facility-administered medications for this visit.       No Known Allergies    Review of Systems:  Review of Systems   Constitutional:  Negative for chills and fever.   HENT:  Negative for congestion and sore throat.    Respiratory:  Negative for cough and shortness of breath.    Cardiovascular:  Negative for chest pain and palpitations.   Gastrointestinal:  Positive for abdominal pain. Negative for nausea and vomiting.   Genitourinary:  Negative for dysuria and hematuria.   Musculoskeletal:  Positive for joint pain. Negative for back pain and myalgias.   Skin:  Negative for itching and rash.   Neurological:  Negative for dizziness and headaches.        Medications:     Current Outpatient Medications:     atorvastatin, TOME 1 TABLETA CADA TARDE, Taking    levothyroxine, 50 mcg, Oral, AM ES, Taking    losartan, 25 mg, Oral, DAILY, Taking    metFORMIN ER, 500 mg, Oral, DAILY, Taking    cetirizine, 10 mg, Oral, QDAY PRN, PRN     Objective:  Vitals:   BP (!) 140/70   Pulse 75   Temp 36.4 °C (97.5 °F)  "(Temporal)   Ht 1.549 m (5' 1\")   Wt 65.6 kg (144 lb 9.6 oz)   SpO2 95%  Body mass index is 27.32 kg/m².    Physical Exam  Constitutional:       General: She is not in acute distress.     Appearance: Normal appearance.   HENT:      Head: Normocephalic and atraumatic.   Eyes:      Extraocular Movements: Extraocular movements intact.      Pupils: Pupils are equal, round, and reactive to light.   Cardiovascular:      Rate and Rhythm: Normal rate and regular rhythm.   Pulmonary:      Effort: No respiratory distress.      Breath sounds: Normal breath sounds.   Abdominal:      General: Bowel sounds are normal.      Tenderness: There is abdominal tenderness. There is no guarding or rebound.   Musculoskeletal:         General: No swelling, tenderness or deformity. Normal range of motion.   Neurological:      General: No focal deficit present.      Mental Status: She is oriented to person, place, and time.          Results:    Lab Results   Component Value Date/Time    CHOLSTRLTOT 123 01/04/2024 08:10 AM    LDL 32 01/04/2024 08:10 AM    HDL 43 01/04/2024 08:10 AM    TRIGLYCERIDE 238 (H) 01/04/2024 08:10 AM       Lab Results   Component Value Date/Time    SODIUM 140 01/04/2024 08:10 AM    POTASSIUM 4.2 01/04/2024 08:10 AM    CHLORIDE 99 01/04/2024 08:10 AM    CO2 26 01/04/2024 08:10 AM    GLUCOSE 124 (H) 01/04/2024 08:10 AM    BUN 12 01/04/2024 08:10 AM    CREATININE 0.70 01/04/2024 08:10 AM    BUNCREATRAT 20 07/15/2022 05:39 AM     Lab Results   Component Value Date/Time    ALKPHOSPHAT 60 01/04/2024 08:10 AM    ASTSGOT 21 01/04/2024 08:10 AM    ALTSGPT 19 01/04/2024 08:10 AM    TBILIRUBIN 0.7 01/04/2024 08:10 AM        Lab Results   Component Value Date/Time    WBC 8.3 01/04/2024 08:10 AM    RBC 4.65 01/04/2024 08:10 AM    HEMOGLOBIN 14.1 01/04/2024 08:10 AM    HEMATOCRIT 40.4 01/04/2024 08:10 AM    MCV 86.9 01/04/2024 08:10 AM    MCH 30.3 01/04/2024 08:10 AM    MCHC 34.9 01/04/2024 08:10 AM    MPV 10.2 01/04/2024 08:10 " AM    NEUTSPOLYS 57.10 01/04/2024 08:10 AM    LYMPHOCYTES 31.00 01/04/2024 08:10 AM    MONOCYTES 8.50 01/04/2024 08:10 AM    EOSINOPHILS 2.20 01/04/2024 08:10 AM    BASOPHILS 1.00 01/04/2024 08:10 AM        Assessment and Plan:    #Bilateral knee pain  X-rays from 2019 were consistent with narrowing of the medial knee joint compartment and moderate tricompartment degenerative changes consistent with osteoarthritis.  Patient has pain right on her knee.  She is having difficulty walking.  No recent falls.  She does not use an assistive walking device.  On exam, patient has good strength and sensation bilaterally.  No ligamentous laxity.  -Due to patient's inability to drive herself to physical therapy and limited support at home, referral to home health physical therapy sent  - Advised over-the-counter analgesics including Tylenol and systemic and topical NSAIDs as needed for pain  - Repeat bilateral knee x-rays  -Referral to orthopedics for further management    #Right lower quadrant pain  Patient with mild right lower quadrant pain that is nonradiating and constant for the past couple weeks.  No associated fevers, chills, nausea, vomiting, weight loss, or changes in bowel habits.  On exam, patient had positive McBurney's but negative Rovsing sign.  Patient is status post cholecystectomy but still has her appendix.  Appears patient has been refusing colonoscopies but had a negative occult blood test 3/2023. Possibly due to appendicitis, diverticulitis, or malabsorption.  - CBC, CMP  - Abdominal ultrasound  - Recommended to limit fatty foods and dairy products  - Strict alarm precautions given to go to the ER for symptoms including fevers, chills, severe pain, nausea, or vomiting    #Hypothyroidism  TSH/ free t4 WNL July 2022-- pt adherent to levothyroxine and taking it correctly  Patient had a recheck TSH/free T4 on last visit but had not obtained it yet  - Resent TSH/free T4 lab work  -Continue Synthroid 50 mcg  daily    Follow Up:  Return in about 2 months (around 7/23/2024).

## 2024-05-24 ENCOUNTER — HOSPITAL ENCOUNTER (OUTPATIENT)
Dept: RADIOLOGY | Facility: MEDICAL CENTER | Age: 74
End: 2024-05-24
Payer: MEDICARE

## 2024-05-24 ENCOUNTER — HOSPITAL ENCOUNTER (OUTPATIENT)
Dept: LAB | Facility: MEDICAL CENTER | Age: 74
End: 2024-05-24
Payer: MEDICARE

## 2024-05-24 DIAGNOSIS — M25.561 CHRONIC PAIN OF BOTH KNEES: ICD-10-CM

## 2024-05-24 DIAGNOSIS — M85.80 OSTEOPENIA, UNSPECIFIED LOCATION: ICD-10-CM

## 2024-05-24 DIAGNOSIS — E03.9 HYPOTHYROIDISM, UNSPECIFIED TYPE: ICD-10-CM

## 2024-05-24 DIAGNOSIS — Z13.228 SCREENING FOR METABOLIC DISORDER: ICD-10-CM

## 2024-05-24 DIAGNOSIS — R10.31 RLQ ABDOMINAL PAIN: ICD-10-CM

## 2024-05-24 DIAGNOSIS — G89.29 CHRONIC PAIN OF BOTH KNEES: ICD-10-CM

## 2024-05-24 DIAGNOSIS — M25.562 CHRONIC PAIN OF BOTH KNEES: ICD-10-CM

## 2024-05-24 LAB
BASOPHILS # BLD AUTO: 0.9 % (ref 0–1.8)
BASOPHILS # BLD: 0.08 K/UL (ref 0–0.12)
EOSINOPHIL # BLD AUTO: 0.17 K/UL (ref 0–0.51)
EOSINOPHIL NFR BLD: 1.9 % (ref 0–6.9)
ERYTHROCYTE [DISTWIDTH] IN BLOOD BY AUTOMATED COUNT: 39 FL (ref 35.9–50)
HCT VFR BLD AUTO: 36.5 % (ref 37–47)
HGB BLD-MCNC: 13 G/DL (ref 12–16)
IMM GRANULOCYTES # BLD AUTO: 0.02 K/UL (ref 0–0.11)
IMM GRANULOCYTES NFR BLD AUTO: 0.2 % (ref 0–0.9)
LYMPHOCYTES # BLD AUTO: 3.49 K/UL (ref 1–4.8)
LYMPHOCYTES NFR BLD: 39.7 % (ref 22–41)
MCH RBC QN AUTO: 30.3 PG (ref 27–33)
MCHC RBC AUTO-ENTMCNC: 35.6 G/DL (ref 32.2–35.5)
MCV RBC AUTO: 85.1 FL (ref 81.4–97.8)
MONOCYTES # BLD AUTO: 0.78 K/UL (ref 0–0.85)
MONOCYTES NFR BLD AUTO: 8.9 % (ref 0–13.4)
NEUTROPHILS # BLD AUTO: 4.25 K/UL (ref 1.82–7.42)
NEUTROPHILS NFR BLD: 48.4 % (ref 44–72)
NRBC # BLD AUTO: 0 K/UL
NRBC BLD-RTO: 0 /100 WBC (ref 0–0.2)
PLATELET # BLD AUTO: 418 K/UL (ref 164–446)
PMV BLD AUTO: 10 FL (ref 9–12.9)
RBC # BLD AUTO: 4.29 M/UL (ref 4.2–5.4)
TSH SERPL DL<=0.005 MIU/L-ACNC: 1.08 UIU/ML (ref 0.38–5.33)
WBC # BLD AUTO: 8.8 K/UL (ref 4.8–10.8)

## 2024-05-25 LAB
ALBUMIN SERPL BCP-MCNC: 4.2 G/DL (ref 3.2–4.9)
ALBUMIN/GLOB SERPL: 1.4 G/DL
ALP SERPL-CCNC: 65 U/L (ref 30–99)
ALT SERPL-CCNC: 19 U/L (ref 2–50)
ANION GAP SERPL CALC-SCNC: 15 MMOL/L (ref 7–16)
AST SERPL-CCNC: 21 U/L (ref 12–45)
BILIRUB SERPL-MCNC: 0.3 MG/DL (ref 0.1–1.5)
BUN SERPL-MCNC: 20 MG/DL (ref 8–22)
CALCIUM ALBUM COR SERPL-MCNC: 9 MG/DL (ref 8.5–10.5)
CALCIUM SERPL-MCNC: 9.2 MG/DL (ref 8.5–10.5)
CHLORIDE SERPL-SCNC: 104 MMOL/L (ref 96–112)
CO2 SERPL-SCNC: 20 MMOL/L (ref 20–33)
CREAT SERPL-MCNC: 0.88 MG/DL (ref 0.5–1.4)
GFR SERPLBLD CREATININE-BSD FMLA CKD-EPI: 68 ML/MIN/1.73 M 2
GLOBULIN SER CALC-MCNC: 3.1 G/DL (ref 1.9–3.5)
GLUCOSE SERPL-MCNC: 116 MG/DL (ref 65–99)
POTASSIUM SERPL-SCNC: 3.4 MMOL/L (ref 3.6–5.5)
PROT SERPL-MCNC: 7.3 G/DL (ref 6–8.2)
SODIUM SERPL-SCNC: 139 MMOL/L (ref 135–145)

## 2024-07-02 DIAGNOSIS — I10 PRIMARY HYPERTENSION: ICD-10-CM

## 2024-07-02 RX ORDER — LOSARTAN POTASSIUM 25 MG/1
25 TABLET ORAL DAILY
Qty: 90 TABLET | Refills: 0 | Status: SHIPPED | OUTPATIENT
Start: 2024-07-02 | End: 2024-09-30

## 2024-07-24 ENCOUNTER — TELEPHONE (OUTPATIENT)
Dept: NEUROLOGY | Facility: MEDICAL CENTER | Age: 74
End: 2024-07-24

## 2024-07-24 ENCOUNTER — OFFICE VISIT (OUTPATIENT)
Dept: INTERNAL MEDICINE | Facility: OTHER | Age: 74
End: 2024-07-24
Payer: MEDICARE

## 2024-07-24 ENCOUNTER — TELEPHONE (OUTPATIENT)
Dept: INTERNAL MEDICINE | Facility: OTHER | Age: 74
End: 2024-07-24

## 2024-07-24 ENCOUNTER — HOSPITAL ENCOUNTER (OUTPATIENT)
Dept: LAB | Facility: MEDICAL CENTER | Age: 74
End: 2024-07-24
Attending: PSYCHIATRY & NEUROLOGY
Payer: MEDICARE

## 2024-07-24 ENCOUNTER — OFFICE VISIT (OUTPATIENT)
Dept: NEUROLOGY | Facility: MEDICAL CENTER | Age: 74
End: 2024-07-24
Attending: PSYCHIATRY & NEUROLOGY
Payer: MEDICARE

## 2024-07-24 VITALS
SYSTOLIC BLOOD PRESSURE: 145 MMHG | WEIGHT: 141.2 LBS | OXYGEN SATURATION: 94 % | BODY MASS INDEX: 26.66 KG/M2 | HEART RATE: 79 BPM | HEIGHT: 61 IN | TEMPERATURE: 97.5 F | DIASTOLIC BLOOD PRESSURE: 77 MMHG

## 2024-07-24 VITALS
WEIGHT: 140.87 LBS | OXYGEN SATURATION: 95 % | SYSTOLIC BLOOD PRESSURE: 142 MMHG | HEIGHT: 61 IN | HEART RATE: 91 BPM | BODY MASS INDEX: 26.6 KG/M2 | TEMPERATURE: 97.1 F | DIASTOLIC BLOOD PRESSURE: 72 MMHG

## 2024-07-24 DIAGNOSIS — K21.9 GASTROESOPHAGEAL REFLUX DISEASE WITHOUT ESOPHAGITIS: ICD-10-CM

## 2024-07-24 DIAGNOSIS — R10.13 EPIGASTRIC PAIN: ICD-10-CM

## 2024-07-24 DIAGNOSIS — I10 ESSENTIAL HYPERTENSION: ICD-10-CM

## 2024-07-24 DIAGNOSIS — F03.A2 MILD DEMENTIA WITH PSYCHOTIC DISTURBANCE, UNSPECIFIED DEMENTIA TYPE (HCC): ICD-10-CM

## 2024-07-24 DIAGNOSIS — Z86.39 HISTORY OF VITAMIN D DEFICIENCY: ICD-10-CM

## 2024-07-24 DIAGNOSIS — E87.6 HYPOKALEMIA: ICD-10-CM

## 2024-07-24 DIAGNOSIS — E11.42 TYPE 2 DIABETES MELLITUS WITH DIABETIC POLYNEUROPATHY, WITHOUT LONG-TERM CURRENT USE OF INSULIN (HCC): ICD-10-CM

## 2024-07-24 DIAGNOSIS — M17.0 PRIMARY OSTEOARTHRITIS OF BOTH KNEES: ICD-10-CM

## 2024-07-24 DIAGNOSIS — E03.8 OTHER SPECIFIED HYPOTHYROIDISM: ICD-10-CM

## 2024-07-24 DIAGNOSIS — F03.A2 MILD DEMENTIA WITH PSYCHOTIC DISTURBANCE, UNSPECIFIED DEMENTIA TYPE (HCC): Primary | ICD-10-CM

## 2024-07-24 LAB
FOLATE SERPL-MCNC: 25.4 NG/ML
HBA1C MFR BLD: 6.7 % (ref ?–5.8)
POCT INT CON NEG: NEGATIVE
POCT INT CON POS: POSITIVE
POTASSIUM SERPL-SCNC: 3.3 MMOL/L (ref 3.6–5.5)
VIT B12 SERPL-MCNC: 752 PG/ML (ref 211–911)

## 2024-07-24 PROCEDURE — 99205 OFFICE O/P NEW HI 60 MIN: CPT | Performed by: PSYCHIATRY & NEUROLOGY

## 2024-07-24 PROCEDURE — 84425 ASSAY OF VITAMIN B-1: CPT

## 2024-07-24 PROCEDURE — 99999 PR NO CHARGE: CPT | Performed by: PSYCHIATRY & NEUROLOGY

## 2024-07-24 PROCEDURE — 84132 ASSAY OF SERUM POTASSIUM: CPT

## 2024-07-24 PROCEDURE — 82607 VITAMIN B-12: CPT

## 2024-07-24 PROCEDURE — 99211 OFF/OP EST MAY X REQ PHY/QHP: CPT | Performed by: PSYCHIATRY & NEUROLOGY

## 2024-07-24 PROCEDURE — 82746 ASSAY OF FOLIC ACID SERUM: CPT

## 2024-07-24 PROCEDURE — 36415 COLL VENOUS BLD VENIPUNCTURE: CPT

## 2024-07-24 PROCEDURE — G2212 PROLONG OUTPT/OFFICE VIS: HCPCS | Performed by: PSYCHIATRY & NEUROLOGY

## 2024-07-24 ASSESSMENT — ANXIETY QUESTIONNAIRES
GAD7 TOTAL SCORE: 1
IF YOU CHECKED OFF ANY PROBLEMS ON THIS QUESTIONNAIRE, HOW DIFFICULT HAVE THESE PROBLEMS MADE IT FOR YOU TO DO YOUR WORK, TAKE CARE OF THINGS AT HOME, OR GET ALONG WITH OTHER PEOPLE: NOT DIFFICULT AT ALL
7. FEELING AFRAID AS IF SOMETHING AWFUL MIGHT HAPPEN: NOT AT ALL
4. TROUBLE RELAXING: NOT AT ALL
2. NOT BEING ABLE TO STOP OR CONTROL WORRYING: SEVERAL DAYS
5. BEING SO RESTLESS THAT IT IS HARD TO SIT STILL: NOT AT ALL
6. BECOMING EASILY ANNOYED OR IRRITABLE: NOT AT ALL
1. FEELING NERVOUS, ANXIOUS, OR ON EDGE: NOT AT ALL
3. WORRYING TOO MUCH ABOUT DIFFERENT THINGS: NOT AT ALL

## 2024-07-24 ASSESSMENT — FIBROSIS 4 INDEX
FIB4 SCORE: 0.88
FIB4 SCORE: 0.88

## 2024-07-24 ASSESSMENT — PATIENT HEALTH QUESTIONNAIRE - PHQ9: CLINICAL INTERPRETATION OF PHQ2 SCORE: 0

## 2024-07-26 ENCOUNTER — HOSPITAL ENCOUNTER (OUTPATIENT)
Dept: LAB | Facility: MEDICAL CENTER | Age: 74
End: 2024-07-26
Payer: MEDICARE

## 2024-07-26 ENCOUNTER — PATIENT MESSAGE (OUTPATIENT)
Dept: INTERNAL MEDICINE | Facility: OTHER | Age: 74
End: 2024-07-26

## 2024-07-26 DIAGNOSIS — E87.6 HYPOKALEMIA: ICD-10-CM

## 2024-07-26 LAB — UREA BREATH TEST QL: NEGATIVE

## 2024-07-26 PROCEDURE — 83013 H PYLORI (C-13) BREATH: CPT

## 2024-07-26 RX ORDER — POTASSIUM CHLORIDE 20 MEQ/1
40 TABLET, EXTENDED RELEASE ORAL DAILY
Qty: 28 TABLET | Refills: 0 | Status: SHIPPED | OUTPATIENT
Start: 2024-07-26 | End: 2024-07-26

## 2024-07-26 RX ORDER — POTASSIUM CHLORIDE 20 MEQ/1
20 TABLET, EXTENDED RELEASE ORAL DAILY
Qty: 14 TABLET | Refills: 0 | Status: SHIPPED | OUTPATIENT
Start: 2024-07-26 | End: 2024-08-09

## 2024-07-30 LAB — VIT B1 BLD-MCNC: 200 NMOL/L (ref 70–180)

## 2024-08-01 ENCOUNTER — HOSPITAL ENCOUNTER (OUTPATIENT)
Dept: RADIOLOGY | Facility: MEDICAL CENTER | Age: 74
End: 2024-08-01
Attending: PSYCHIATRY & NEUROLOGY
Payer: MEDICARE

## 2024-08-01 ENCOUNTER — DOCUMENTATION (OUTPATIENT)
Dept: NEUROLOGY | Facility: MEDICAL CENTER | Age: 74
End: 2024-08-01
Payer: MEDICARE

## 2024-08-01 DIAGNOSIS — F03.A2 MILD DEMENTIA WITH PSYCHOTIC DISTURBANCE, UNSPECIFIED DEMENTIA TYPE (HCC): Primary | ICD-10-CM

## 2024-08-01 DIAGNOSIS — F03.A2 MILD DEMENTIA WITH PSYCHOTIC DISTURBANCE, UNSPECIFIED DEMENTIA TYPE (HCC): ICD-10-CM

## 2024-08-01 DIAGNOSIS — R90.89 ABNORMAL BRAIN MRI: ICD-10-CM

## 2024-08-01 PROCEDURE — 70551 MRI BRAIN STEM W/O DYE: CPT

## 2024-08-02 RX ORDER — POTASSIUM CHLORIDE 1500 MG/1
20 TABLET, EXTENDED RELEASE ORAL DAILY
Qty: 30 TABLET | Refills: 1 | Status: SHIPPED | OUTPATIENT
Start: 2024-08-02 | End: 2024-08-14 | Stop reason: SDUPTHER

## 2024-08-06 ENCOUNTER — HOSPITAL ENCOUNTER (OUTPATIENT)
Dept: LAB | Facility: MEDICAL CENTER | Age: 74
End: 2024-08-06
Attending: PSYCHIATRY & NEUROLOGY
Payer: MEDICARE

## 2024-08-06 ENCOUNTER — HOSPITAL ENCOUNTER (OUTPATIENT)
Dept: LAB | Facility: MEDICAL CENTER | Age: 74
End: 2024-08-06
Payer: MEDICARE

## 2024-08-06 DIAGNOSIS — E87.6 HYPOKALEMIA: ICD-10-CM

## 2024-08-06 DIAGNOSIS — Z86.39 HISTORY OF VITAMIN D DEFICIENCY: ICD-10-CM

## 2024-08-06 LAB
25(OH)D3 SERPL-MCNC: 35 NG/ML (ref 30–100)
POTASSIUM SERPL-SCNC: 3.6 MMOL/L (ref 3.6–5.5)

## 2024-08-06 PROCEDURE — 82306 VITAMIN D 25 HYDROXY: CPT | Mod: GA

## 2024-08-06 PROCEDURE — 84132 ASSAY OF SERUM POTASSIUM: CPT

## 2024-08-06 PROCEDURE — 36415 COLL VENOUS BLD VENIPUNCTURE: CPT | Mod: GA

## 2024-08-13 ENCOUNTER — HOSPITAL ENCOUNTER (OUTPATIENT)
Dept: RADIOLOGY | Facility: MEDICAL CENTER | Age: 74
End: 2024-08-13
Attending: PSYCHIATRY & NEUROLOGY
Payer: MEDICARE

## 2024-08-13 DIAGNOSIS — R90.89 ABNORMAL BRAIN MRI: ICD-10-CM

## 2024-08-13 DIAGNOSIS — F03.A2 MILD DEMENTIA WITH PSYCHOTIC DISTURBANCE, UNSPECIFIED DEMENTIA TYPE (HCC): ICD-10-CM

## 2024-08-13 LAB — GLUCOSE BLD-MCNC: 146 MG/DL (ref 65–99)

## 2024-08-13 PROCEDURE — A9552 F18 FDG: HCPCS

## 2024-09-26 ENCOUNTER — HOSPITAL ENCOUNTER (EMERGENCY)
Facility: MEDICAL CENTER | Age: 74
End: 2024-09-27
Attending: EMERGENCY MEDICINE
Payer: MEDICARE

## 2024-09-26 ENCOUNTER — APPOINTMENT (OUTPATIENT)
Dept: RADIOLOGY | Facility: MEDICAL CENTER | Age: 74
End: 2024-09-26
Attending: EMERGENCY MEDICINE
Payer: MEDICARE

## 2024-09-26 DIAGNOSIS — F02.84 ALZHEIMER'S DEMENTIA WITH ANXIETY, UNSPECIFIED DEMENTIA SEVERITY, UNSPECIFIED TIMING OF DEMENTIA ONSET (HCC): ICD-10-CM

## 2024-09-26 DIAGNOSIS — G30.9 ALZHEIMER'S DEMENTIA WITH ANXIETY, UNSPECIFIED DEMENTIA SEVERITY, UNSPECIFIED TIMING OF DEMENTIA ONSET (HCC): ICD-10-CM

## 2024-09-26 DIAGNOSIS — G47.00 INSOMNIA, UNSPECIFIED TYPE: ICD-10-CM

## 2024-09-26 DIAGNOSIS — R41.0 ACUTE DELIRIUM: ICD-10-CM

## 2024-09-26 DIAGNOSIS — E86.0 ACUTE DEHYDRATION: ICD-10-CM

## 2024-09-26 LAB
ALBUMIN SERPL BCP-MCNC: 4.1 G/DL (ref 3.2–4.9)
ALBUMIN/GLOB SERPL: 1.2 G/DL
ALP SERPL-CCNC: 90 U/L (ref 30–99)
ALT SERPL-CCNC: 20 U/L (ref 2–50)
ANION GAP SERPL CALC-SCNC: 17 MMOL/L (ref 7–16)
AST SERPL-CCNC: 21 U/L (ref 12–45)
BASOPHILS # BLD AUTO: 0.9 % (ref 0–1.8)
BASOPHILS # BLD: 0.07 K/UL (ref 0–0.12)
BILIRUB SERPL-MCNC: 0.2 MG/DL (ref 0.1–1.5)
BUN SERPL-MCNC: 20 MG/DL (ref 8–22)
CALCIUM ALBUM COR SERPL-MCNC: 9.3 MG/DL (ref 8.5–10.5)
CALCIUM SERPL-MCNC: 9.4 MG/DL (ref 8.5–10.5)
CHLORIDE SERPL-SCNC: 103 MMOL/L (ref 96–112)
CO2 SERPL-SCNC: 19 MMOL/L (ref 20–33)
CREAT SERPL-MCNC: 0.88 MG/DL (ref 0.5–1.4)
EOSINOPHIL # BLD AUTO: 0.09 K/UL (ref 0–0.51)
EOSINOPHIL NFR BLD: 1.1 % (ref 0–6.9)
ERYTHROCYTE [DISTWIDTH] IN BLOOD BY AUTOMATED COUNT: 39.5 FL (ref 35.9–50)
FLUAV RNA SPEC QL NAA+PROBE: NEGATIVE
FLUBV RNA SPEC QL NAA+PROBE: NEGATIVE
GFR SERPLBLD CREATININE-BSD FMLA CKD-EPI: 68 ML/MIN/1.73 M 2
GLOBULIN SER CALC-MCNC: 3.3 G/DL (ref 1.9–3.5)
GLUCOSE BLD STRIP.AUTO-MCNC: 217 MG/DL (ref 65–99)
GLUCOSE SERPL-MCNC: 226 MG/DL (ref 65–99)
HCT VFR BLD AUTO: 36.6 % (ref 37–47)
HGB BLD-MCNC: 13.2 G/DL (ref 12–16)
IMM GRANULOCYTES # BLD AUTO: 0.02 K/UL (ref 0–0.11)
IMM GRANULOCYTES NFR BLD AUTO: 0.2 % (ref 0–0.9)
LACTATE SERPL-SCNC: 2.3 MMOL/L (ref 0.5–2)
LIPASE SERPL-CCNC: 78 U/L (ref 11–82)
LYMPHOCYTES # BLD AUTO: 2.45 K/UL (ref 1–4.8)
LYMPHOCYTES NFR BLD: 30.4 % (ref 22–41)
MCH RBC QN AUTO: 30.6 PG (ref 27–33)
MCHC RBC AUTO-ENTMCNC: 36.1 G/DL (ref 32.2–35.5)
MCV RBC AUTO: 84.7 FL (ref 81.4–97.8)
MONOCYTES # BLD AUTO: 0.76 K/UL (ref 0–0.85)
MONOCYTES NFR BLD AUTO: 9.4 % (ref 0–13.4)
NEUTROPHILS # BLD AUTO: 4.66 K/UL (ref 1.82–7.42)
NEUTROPHILS NFR BLD: 58 % (ref 44–72)
NRBC # BLD AUTO: 0 K/UL
NRBC BLD-RTO: 0 /100 WBC (ref 0–0.2)
PLATELET # BLD AUTO: 408 K/UL (ref 164–446)
PMV BLD AUTO: 9.1 FL (ref 9–12.9)
POTASSIUM SERPL-SCNC: 3.9 MMOL/L (ref 3.6–5.5)
PROT SERPL-MCNC: 7.4 G/DL (ref 6–8.2)
RBC # BLD AUTO: 4.32 M/UL (ref 4.2–5.4)
RSV RNA SPEC QL NAA+PROBE: NEGATIVE
SARS-COV-2 RNA RESP QL NAA+PROBE: NOTDETECTED
SODIUM SERPL-SCNC: 139 MMOL/L (ref 135–145)
WBC # BLD AUTO: 8.1 K/UL (ref 4.8–10.8)

## 2024-09-26 PROCEDURE — 93005 ELECTROCARDIOGRAM TRACING: CPT | Performed by: EMERGENCY MEDICINE

## 2024-09-26 PROCEDURE — 700105 HCHG RX REV CODE 258: Mod: UD | Performed by: EMERGENCY MEDICINE

## 2024-09-26 PROCEDURE — 83605 ASSAY OF LACTIC ACID: CPT

## 2024-09-26 PROCEDURE — 74019 RADEX ABDOMEN 2 VIEWS: CPT

## 2024-09-26 PROCEDURE — 82962 GLUCOSE BLOOD TEST: CPT

## 2024-09-26 PROCEDURE — 99285 EMERGENCY DEPT VISIT HI MDM: CPT

## 2024-09-26 PROCEDURE — 80053 COMPREHEN METABOLIC PANEL: CPT

## 2024-09-26 PROCEDURE — 83690 ASSAY OF LIPASE: CPT

## 2024-09-26 PROCEDURE — 36415 COLL VENOUS BLD VENIPUNCTURE: CPT

## 2024-09-26 PROCEDURE — 0241U HCHG SARS-COV-2 COVID-19 NFCT DS RESP RNA 4 TRGT ED POC: CPT

## 2024-09-26 PROCEDURE — 71045 X-RAY EXAM CHEST 1 VIEW: CPT

## 2024-09-26 PROCEDURE — 85025 COMPLETE CBC W/AUTO DIFF WBC: CPT

## 2024-09-26 RX ORDER — ACETAMINOPHEN 500 MG
1000 TABLET ORAL ONCE
Status: DISCONTINUED | OUTPATIENT
Start: 2024-09-26 | End: 2024-09-27 | Stop reason: HOSPADM

## 2024-09-26 RX ORDER — SODIUM CHLORIDE 9 MG/ML
1000 INJECTION, SOLUTION INTRAVENOUS ONCE
Status: COMPLETED | OUTPATIENT
Start: 2024-09-26 | End: 2024-09-27

## 2024-09-26 RX ADMIN — SODIUM CHLORIDE 1000 ML: 9 INJECTION, SOLUTION INTRAVENOUS at 22:27

## 2024-09-26 ASSESSMENT — FIBROSIS 4 INDEX: FIB4 SCORE: 0.88

## 2024-09-27 ENCOUNTER — HOME HEALTH ADMISSION (OUTPATIENT)
Dept: HOME HEALTH SERVICES | Facility: HOME HEALTHCARE | Age: 74
End: 2024-09-27
Payer: MEDICARE

## 2024-09-27 ENCOUNTER — APPOINTMENT (OUTPATIENT)
Dept: RADIOLOGY | Facility: MEDICAL CENTER | Age: 74
End: 2024-09-27
Attending: EMERGENCY MEDICINE
Payer: MEDICARE

## 2024-09-27 VITALS
HEART RATE: 92 BPM | TEMPERATURE: 97.7 F | BODY MASS INDEX: 26.43 KG/M2 | OXYGEN SATURATION: 95 % | SYSTOLIC BLOOD PRESSURE: 172 MMHG | RESPIRATION RATE: 14 BRPM | DIASTOLIC BLOOD PRESSURE: 89 MMHG | WEIGHT: 140 LBS | HEIGHT: 61 IN

## 2024-09-27 PROBLEM — R73.9 HYPERGLYCEMIA: Status: ACTIVE | Noted: 2024-09-27

## 2024-09-27 LAB
ANION GAP SERPL CALC-SCNC: 11 MMOL/L (ref 7–16)
APPEARANCE UR: CLEAR
BILIRUB UR QL STRIP.AUTO: NEGATIVE
BUN SERPL-MCNC: 15 MG/DL (ref 8–22)
CALCIUM SERPL-MCNC: 8.9 MG/DL (ref 8.5–10.5)
CHLORIDE SERPL-SCNC: 108 MMOL/L (ref 96–112)
CO2 SERPL-SCNC: 21 MMOL/L (ref 20–33)
COLOR UR: YELLOW
CREAT SERPL-MCNC: 0.81 MG/DL (ref 0.5–1.4)
EKG IMPRESSION: NORMAL
GFR SERPLBLD CREATININE-BSD FMLA CKD-EPI: 75 ML/MIN/1.73 M 2
GLUCOSE BLD STRIP.AUTO-MCNC: 144 MG/DL (ref 65–99)
GLUCOSE BLD STRIP.AUTO-MCNC: 144 MG/DL (ref 65–99)
GLUCOSE SERPL-MCNC: 155 MG/DL (ref 65–99)
GLUCOSE UR STRIP.AUTO-MCNC: NEGATIVE MG/DL
KETONES UR STRIP.AUTO-MCNC: NEGATIVE MG/DL
LACTATE SERPL-SCNC: 1.9 MMOL/L (ref 0.5–2)
LACTATE SERPL-SCNC: 2.1 MMOL/L (ref 0.5–2)
LACTATE SERPL-SCNC: 2.6 MMOL/L (ref 0.5–2)
LEUKOCYTE ESTERASE UR QL STRIP.AUTO: NEGATIVE
MICRO URNS: NORMAL
NITRITE UR QL STRIP.AUTO: NEGATIVE
PH UR STRIP.AUTO: 5.5 [PH] (ref 5–8)
POTASSIUM SERPL-SCNC: 3.7 MMOL/L (ref 3.6–5.5)
PROT UR QL STRIP: NEGATIVE MG/DL
RBC UR QL AUTO: NEGATIVE
SODIUM SERPL-SCNC: 140 MMOL/L (ref 135–145)
SP GR UR STRIP.AUTO: 1.02
UROBILINOGEN UR STRIP.AUTO-MCNC: 0.2 MG/DL

## 2024-09-27 PROCEDURE — A9270 NON-COVERED ITEM OR SERVICE: HCPCS | Mod: UD | Performed by: EMERGENCY MEDICINE

## 2024-09-27 PROCEDURE — 700111 HCHG RX REV CODE 636 W/ 250 OVERRIDE (IP): Mod: JZ,UD | Performed by: EMERGENCY MEDICINE

## 2024-09-27 PROCEDURE — 80048 BASIC METABOLIC PNL TOTAL CA: CPT

## 2024-09-27 PROCEDURE — 82962 GLUCOSE BLOOD TEST: CPT | Mod: 91

## 2024-09-27 PROCEDURE — 96375 TX/PRO/DX INJ NEW DRUG ADDON: CPT

## 2024-09-27 PROCEDURE — 70450 CT HEAD/BRAIN W/O DYE: CPT

## 2024-09-27 PROCEDURE — 700102 HCHG RX REV CODE 250 W/ 637 OVERRIDE(OP): Mod: UD | Performed by: STUDENT IN AN ORGANIZED HEALTH CARE EDUCATION/TRAINING PROGRAM

## 2024-09-27 PROCEDURE — 700102 HCHG RX REV CODE 250 W/ 637 OVERRIDE(OP): Mod: UD | Performed by: EMERGENCY MEDICINE

## 2024-09-27 PROCEDURE — 83605 ASSAY OF LACTIC ACID: CPT | Mod: 91

## 2024-09-27 PROCEDURE — 96374 THER/PROPH/DIAG INJ IV PUSH: CPT

## 2024-09-27 PROCEDURE — 700105 HCHG RX REV CODE 258: Mod: UD | Performed by: STUDENT IN AN ORGANIZED HEALTH CARE EDUCATION/TRAINING PROGRAM

## 2024-09-27 PROCEDURE — 99284 EMERGENCY DEPT VISIT MOD MDM: CPT | Performed by: STUDENT IN AN ORGANIZED HEALTH CARE EDUCATION/TRAINING PROGRAM

## 2024-09-27 PROCEDURE — 700111 HCHG RX REV CODE 636 W/ 250 OVERRIDE (IP): Mod: JG,UD | Performed by: STUDENT IN AN ORGANIZED HEALTH CARE EDUCATION/TRAINING PROGRAM

## 2024-09-27 PROCEDURE — 96372 THER/PROPH/DIAG INJ SC/IM: CPT | Mod: XU

## 2024-09-27 PROCEDURE — 81003 URINALYSIS AUTO W/O SCOPE: CPT

## 2024-09-27 RX ORDER — ACETAMINOPHEN 325 MG/1
650 TABLET ORAL EVERY 6 HOURS PRN
Status: DISCONTINUED | OUTPATIENT
Start: 2024-09-27 | End: 2024-09-27 | Stop reason: HOSPADM

## 2024-09-27 RX ORDER — DEXTROSE MONOHYDRATE 25 G/50ML
25 INJECTION, SOLUTION INTRAVENOUS
Status: DISCONTINUED | OUTPATIENT
Start: 2024-09-27 | End: 2024-09-27 | Stop reason: HOSPADM

## 2024-09-27 RX ORDER — ONDANSETRON 2 MG/ML
4 INJECTION INTRAMUSCULAR; INTRAVENOUS EVERY 4 HOURS PRN
Status: DISCONTINUED | OUTPATIENT
Start: 2024-09-27 | End: 2024-09-27 | Stop reason: HOSPADM

## 2024-09-27 RX ORDER — INSULIN LISPRO 100 [IU]/ML
0.2 INJECTION, SOLUTION INTRAVENOUS; SUBCUTANEOUS
Status: DISCONTINUED | OUTPATIENT
Start: 2024-09-27 | End: 2024-09-27 | Stop reason: HOSPADM

## 2024-09-27 RX ORDER — ATORVASTATIN CALCIUM 40 MG/1
40 TABLET, FILM COATED ORAL EVERY EVENING
Status: DISCONTINUED | OUTPATIENT
Start: 2024-09-27 | End: 2024-09-27 | Stop reason: HOSPADM

## 2024-09-27 RX ORDER — LOSARTAN POTASSIUM 25 MG/1
25 TABLET ORAL ONCE
Status: COMPLETED | OUTPATIENT
Start: 2024-09-27 | End: 2024-09-27

## 2024-09-27 RX ORDER — SODIUM CHLORIDE 9 MG/ML
INJECTION, SOLUTION INTRAVENOUS CONTINUOUS
Status: DISCONTINUED | OUTPATIENT
Start: 2024-09-27 | End: 2024-09-27 | Stop reason: HOSPADM

## 2024-09-27 RX ORDER — ONDANSETRON 4 MG/1
4 TABLET, ORALLY DISINTEGRATING ORAL EVERY 4 HOURS PRN
Status: DISCONTINUED | OUTPATIENT
Start: 2024-09-27 | End: 2024-09-27 | Stop reason: HOSPADM

## 2024-09-27 RX ORDER — SODIUM CHLORIDE, SODIUM LACTATE, POTASSIUM CHLORIDE, CALCIUM CHLORIDE 600; 310; 30; 20 MG/100ML; MG/100ML; MG/100ML; MG/100ML
500 INJECTION, SOLUTION INTRAVENOUS ONCE
Status: COMPLETED | OUTPATIENT
Start: 2024-09-27 | End: 2024-09-27

## 2024-09-27 RX ORDER — LOSARTAN POTASSIUM 25 MG/1
25 TABLET ORAL DAILY
Status: DISCONTINUED | OUTPATIENT
Start: 2024-09-27 | End: 2024-09-27 | Stop reason: HOSPADM

## 2024-09-27 RX ORDER — CHOLECALCIFEROL (VITAMIN D3) 50 MCG
2000 TABLET ORAL DAILY
COMMUNITY

## 2024-09-27 RX ORDER — INSULIN LISPRO 100 [IU]/ML
1-6 INJECTION, SOLUTION INTRAVENOUS; SUBCUTANEOUS
Status: DISCONTINUED | OUTPATIENT
Start: 2024-09-27 | End: 2024-09-27 | Stop reason: HOSPADM

## 2024-09-27 RX ORDER — LORATADINE 10 MG/1
10 TABLET ORAL DAILY
COMMUNITY
End: 2024-10-20

## 2024-09-27 RX ORDER — ACETAMINOPHEN 500 MG
500-1000 TABLET ORAL EVERY 6 HOURS PRN
COMMUNITY

## 2024-09-27 RX ORDER — SODIUM CHLORIDE, SODIUM LACTATE, POTASSIUM CHLORIDE, CALCIUM CHLORIDE 600; 310; 30; 20 MG/100ML; MG/100ML; MG/100ML; MG/100ML
INJECTION, SOLUTION INTRAVENOUS ONCE
Status: DISCONTINUED | OUTPATIENT
Start: 2024-09-27 | End: 2024-09-27

## 2024-09-27 RX ORDER — SODIUM CHLORIDE 9 MG/ML
INJECTION, SOLUTION INTRAVENOUS ONCE
Status: COMPLETED | OUTPATIENT
Start: 2024-09-27 | End: 2024-09-27

## 2024-09-27 RX ORDER — HYDRALAZINE HYDROCHLORIDE 20 MG/ML
10 INJECTION INTRAMUSCULAR; INTRAVENOUS ONCE
Status: COMPLETED | OUTPATIENT
Start: 2024-09-27 | End: 2024-09-27

## 2024-09-27 RX ORDER — METFORMIN HYDROCHLORIDE 500 MG/1
500 TABLET, EXTENDED RELEASE ORAL DAILY
Status: DISCONTINUED | OUTPATIENT
Start: 2024-09-27 | End: 2024-09-27

## 2024-09-27 RX ORDER — LABETALOL HYDROCHLORIDE 5 MG/ML
10 INJECTION, SOLUTION INTRAVENOUS EVERY 4 HOURS PRN
Status: DISCONTINUED | OUTPATIENT
Start: 2024-09-27 | End: 2024-09-27 | Stop reason: HOSPADM

## 2024-09-27 RX ORDER — LEVOTHYROXINE SODIUM 50 UG/1
50 TABLET ORAL
Status: DISCONTINUED | OUTPATIENT
Start: 2024-09-27 | End: 2024-09-27 | Stop reason: HOSPADM

## 2024-09-27 RX ORDER — ENOXAPARIN SODIUM 100 MG/ML
40 INJECTION SUBCUTANEOUS DAILY
Status: DISCONTINUED | OUTPATIENT
Start: 2024-09-27 | End: 2024-09-27 | Stop reason: HOSPADM

## 2024-09-27 RX ADMIN — SODIUM CHLORIDE: 9 INJECTION, SOLUTION INTRAVENOUS at 06:30

## 2024-09-27 RX ADMIN — LOSARTAN POTASSIUM 25 MG: 25 TABLET, FILM COATED ORAL at 08:12

## 2024-09-27 RX ADMIN — INSULIN LISPRO 4 UNITS: 100 INJECTION, SOLUTION INTRAVENOUS; SUBCUTANEOUS at 08:19

## 2024-09-27 RX ADMIN — LEVOTHYROXINE SODIUM 50 MCG: 0.05 TABLET ORAL at 06:41

## 2024-09-27 RX ADMIN — SODIUM CHLORIDE: 9 INJECTION, SOLUTION INTRAVENOUS at 08:13

## 2024-09-27 RX ADMIN — INSULIN LISPRO 4 UNITS: 100 INJECTION, SOLUTION INTRAVENOUS; SUBCUTANEOUS at 11:27

## 2024-09-27 RX ADMIN — LABETALOL HYDROCHLORIDE 10 MG: 5 INJECTION, SOLUTION INTRAVENOUS at 06:51

## 2024-09-27 RX ADMIN — LOSARTAN POTASSIUM 25 MG: 25 TABLET, FILM COATED ORAL at 06:41

## 2024-09-27 RX ADMIN — SODIUM CHLORIDE, POTASSIUM CHLORIDE, SODIUM LACTATE AND CALCIUM CHLORIDE 500 ML: 600; 310; 30; 20 INJECTION, SOLUTION INTRAVENOUS at 04:45

## 2024-09-27 RX ADMIN — HYDRALAZINE HYDROCHLORIDE 10 MG: 20 INJECTION, SOLUTION INTRAMUSCULAR; INTRAVENOUS at 08:10

## 2024-09-27 NOTE — CONSULTS
Hospital Medicine Consultation    Date of Service  9/27/2024    Referring Physician  MOO Vásquez*    Consulting Physician  Len Garcia M.D.    Reason for Consultation  Dementia    History of Presenting Illness  74 y.o. female who presented 9/26/2024 with episodes of confusion.  Patient has history of Alzheimer's disease and is an unreliable historian.  Per EMS, patient has not slept for the last few days.  She was brought to the ER for evaluation of confusion.    Patient is unable to provide much history due to her dementia.  She states that she takes care of her  at home.  She is alert and oriented to self, unable to state where she is or what she is doing in the ER.    In ED, found to have lactic acid 2.3, hyperglycemia. UA negative for infection.     Review of Systems  ROS  Unable to assess due to dementia  Past Medical History   has a past medical history of Asthma, Diabetes, Heart burn, Hypertension, Hypothyroidism, and Unspecified disorder of thyroid.    Surgical History   has a past surgical history that includes scleral buckling (2/12/2013) and cholecystectomy.    Family History  family history includes Diabetes in her sister; Heart Attack in her mother; Prostate cancer in her father.    Social History   reports that she has never smoked. She has never used smokeless tobacco. She reports that she does not drink alcohol and does not use drugs.    Medications  Prior to Admission Medications   Prescriptions Last Dose Informant Patient Reported? Taking?   atorvastatin (LIPITOR) 40 MG Tab   No No   Sig: TOME 1 TABLETA CADA TARDE   levothyroxine (SYNTHROID) 50 MCG Tab   No No   Sig: Take 1 Tablet by mouth every morning on an empty stomach.   losartan (COZAAR) 25 MG Tab   No No   Sig: Take 1 Tablet by mouth every day for 90 days.   metFORMIN ER (GLUCOPHAGE XR) 500 MG TABLET SR 24 HR   No No   Sig: Take 1 Tablet by mouth every day for 180 days.   potassium chloride SA (KDUR) 20 MEQ Tab CR    No No   Sig: Take 1 Tablet by mouth every day.      Facility-Administered Medications: None       Allergies  No Known Allergies    Physical Exam  Temp:  [36.4 °C (97.6 °F)] 36.4 °C (97.6 °F)  Pulse:  [74-84] 84  Resp:  [15-29] 18  BP: (136-159)/(68-78) 159/72  SpO2:  [90 %-96 %] 90 %    Physical Exam  Constitutional:       Appearance: Normal appearance.      Comments: Unreliable historian   HENT:      Head: Normocephalic.      Nose: Nose normal.      Mouth/Throat:      Mouth: Mucous membranes are moist.   Eyes:      Pupils: Pupils are equal, round, and reactive to light.   Cardiovascular:      Rate and Rhythm: Normal rate and regular rhythm.   Pulmonary:      Effort: Pulmonary effort is normal.      Breath sounds: Normal breath sounds.   Abdominal:      General: Abdomen is flat. Bowel sounds are normal.      Palpations: Abdomen is soft.   Musculoskeletal:         General: Normal range of motion.      Cervical back: Neck supple.   Skin:     General: Skin is warm.   Neurological:      General: No focal deficit present.      Mental Status: She is alert.      Comments: AO x 1         Fluids      Laboratory  Recent Labs     09/26/24  2102   WBC 8.1   RBC 4.32   HEMOGLOBIN 13.2   HEMATOCRIT 36.6*   MCV 84.7   MCH 30.6   MCHC 36.1*   RDW 39.5   PLATELETCT 408   MPV 9.1     Recent Labs     09/26/24  2102   SODIUM 139   POTASSIUM 3.9   CHLORIDE 103   CO2 19*   GLUCOSE 226*   BUN 20   CREATININE 0.88   CALCIUM 9.4                     Imaging  DX-CHEST-PORTABLE (1 VIEW)   Final Result         1.  No acute cardiopulmonary disease.   2.  Atherosclerosis      [id number] VIEWS   Final Result         1.  Moderate quantity of stool in colon favors changes of constipation, otherwise nonspecific bowel gas pattern          Assessment/Plan  * Mild dementia with psychotic disturbance (HCC)- (present on admission)  Assessment & Plan  History of dementia and is following with PCP outpatient  Does not meet criteria for inpatient at this  time  Medication reconciled    Hyperglycemia  Assessment & Plan  Found to have sugar 226 with prerenal azotemia  Fluids  RISS      Hypothyroidism- (present on admission)  Assessment & Plan  Synthroid     Dyslipidemia- (present on admission)  Assessment & Plan  Lipitor    Essential hypertension- (present on admission)  Assessment & Plan  Continue home medications

## 2024-09-27 NOTE — ED TRIAGE NOTES
Chief Complaint   Patient presents with    Insomnia     BIBA [facility], Pt reports not sleeping for 3 days, vomiting 4 days ago but not currently. Hx of HTN, Alzheimers. [facility] reports FSBG 268.       BIB [facility] to [facility], pt on monitor and in gown, labs drawn and sent.   Pt Reports not sleeping for 3 days, having vomited multiple times 4 days ago, not currently N/V. Family reports a possible failure to thrive and possible infection.  Pt arrives AOx2, GCS 14 (confused), FSBG 268. Pt reports knowing she is prediabetic.     Medications given en route: None    Vitals:    09/26/24 2045   BP: (!) 140/73   Pulse: 79   Resp: 18   Temp: 36.4 °C (97.6 °F)   SpO2: 95%

## 2024-09-27 NOTE — ED NOTES
Pt ambulatory to restroom w/steady gait, NAD. Pt was unable to provide enough urine for UA. Will try again later. Call light within reach.    Spoke with RICHA (Kenzie [phone]) stated she has 24hr monitoring on Pt and has counted Pt to be awake majority of 8 days.   yes

## 2024-09-27 NOTE — DISCHARGE SUMMARY
"ED Observation Discharge Summary    Scribed for Kobe Franz M.d. by Timothy Potts. 2024  10:43 AM    Patient:Tonya Estes  Patient : 1950  Patient MRN: [id number]  Patient PCP: Luna Shaikh M.D.    Admit Date: 2024  Discharge Date and Time: 24 10:43 AM  Discharge Diagnosis:   1. Acute delirium        2. Alzheimer's dementia with anxiety, unspecified dementia severity, unspecified timing of dementia onset (HCC)        3. Acute dehydration        4. Insomnia, unspecified type        Discharge Attending: Timothy Potts  Discharge Service: ED Observation    ED Course  Tonya is a 74 y.o. female who was evaluated at [facility] for confusion and insomnia. I was able to discuss with social work and the patient's daughter via telephone. I discussed the patient's labs with the daughter and a plan for discharge which she and the patient agrees to.      Discharge Exam:  BP (!) (P) 160/71   Pulse (P) 69   Temp 36.4 °C (97.6 °F) (Temporal)   Resp (P) 12   Ht 1.549 m (5' 1\")   Wt 63.5 kg (140 lb)   SpO2 (P) 95%   BMI 26.45 kg/m² .      Constitutional: Awake and alert. Calm.   Cardiovascular: Normal heart rate   Thorax & Lungs: No respiratory distress  Abdomen: Nontender and soft   Psychiatric: Affect normal    Labs  Results for orders placed or performed during the hospital encounter of 24   CBC with Differential   Result Value Ref Range    WBC 8.1 4.8 - 10.8 K/uL    RBC 4.32 4.20 - 5.40 M/uL    Hemoglobin 13.2 12.0 - 16.0 g/dL    Hematocrit 36.6 (L) 37.0 - 47.0 %    MCV 84.7 81.4 - 97.8 fL    MCH 30.6 27.0 - 33.0 pg    MCHC 36.1 (H) 32.2 - 35.5 g/dL    RDW 39.5 35.9 - 50.0 fL    Platelet Count 408 164 - 446 K/uL    MPV 9.1 9.0 - 12.9 fL    Neutrophils-Polys 58.00 44.00 - 72.00 %    Lymphocytes 30.40 22.00 - 41.00 %    Monocytes 9.40 0.00 - 13.40 %    Eosinophils 1.10 0.00 - 6.90 %    Basophils 0.90 0.00 - 1.80 %    Immature Granulocytes 0.20 0.00 - 0.90 %    Nucleated RBC 0.00 0.00 - " 0.20 /100 WBC    Neutrophils (Absolute) 4.66 1.82 - 7.42 K/uL    Lymphs (Absolute) 2.45 1.00 - 4.80 K/uL    Monos (Absolute) 0.76 0.00 - 0.85 K/uL    Eos (Absolute) 0.09 0.00 - 0.51 K/uL    Baso (Absolute) 0.07 0.00 - 0.12 K/uL    Immature Granulocytes (abs) 0.02 0.00 - 0.11 K/uL    NRBC (Absolute) 0.00 K/uL   Comp Metabolic Panel   Result Value Ref Range    Sodium 139 135 - 145 mmol/L    Potassium 3.9 3.6 - 5.5 mmol/L    Chloride 103 96 - 112 mmol/L    Co2 19 (L) 20 - 33 mmol/L    Anion Gap 17.0 (H) 7.0 - 16.0    Glucose 226 (H) 65 - 99 mg/dL    Bun 20 8 - 22 mg/dL    Creatinine 0.88 0.50 - 1.40 mg/dL    Calcium 9.4 8.5 - 10.5 mg/dL    Correct Calcium 9.3 8.5 - 10.5 mg/dL    AST(SGOT) 21 12 - 45 U/L    ALT(SGPT) 20 2 - 50 U/L    Alkaline Phosphatase 90 30 - 99 U/L    Total Bilirubin 0.2 0.1 - 1.5 mg/dL    Albumin 4.1 3.2 - 4.9 g/dL    Total Protein 7.4 6.0 - 8.2 g/dL    Globulin 3.3 1.9 - 3.5 g/dL    A-G Ratio 1.2 g/dL   URINALYSIS    Specimen: Urine   Result Value Ref Range    Color Yellow     Character Clear     Specific Gravity 1.025 <1.035    Ph 5.5 5.0 - 8.0    Glucose Negative Negative mg/dL    Ketones Negative Negative mg/dL    Protein Negative Negative mg/dL    Bilirubin Negative Negative    Urobilinogen, Urine 0.2 Negative    Nitrite Negative Negative    Leukocyte Esterase Negative Negative    Occult Blood Negative Negative    Micro Urine Req see below    LACTIC ACID   Result Value Ref Range    Lactic Acid 2.3 (H) 0.5 - 2.0 mmol/L   ESTIMATED GFR   Result Value Ref Range    GFR (CKD-EPI) 68 >60 mL/min/1.73 m 2   LIPASE   Result Value Ref Range    Lipase 78 11 - 82 U/L   LACTIC ACID   Result Value Ref Range    Lactic Acid 2.1 (H) 0.5 - 2.0 mmol/L   LACTIC ACID   Result Value Ref Range    Lactic Acid 2.6 (H) 0.5 - 2.0 mmol/L   LACTIC ACID   Result Value Ref Range    Lactic Acid 1.9 0.5 - 2.0 mmol/L   BASIC METABOLIC PANEL   Result Value Ref Range    Sodium 140 135 - 145 mmol/L    Potassium 3.7 3.6 - 5.5  mmol/L    Chloride 108 96 - 112 mmol/L    Co2 21 20 - 33 mmol/L    Glucose 155 (H) 65 - 99 mg/dL    Bun 15 8 - 22 mg/dL    Creatinine 0.81 0.50 - 1.40 mg/dL    Calcium 8.9 8.5 - 10.5 mg/dL    Anion Gap 11.0 7.0 - 16.0   ESTIMATED GFR   Result Value Ref Range    GFR (CKD-EPI) 75 >60 mL/min/1.73 m 2   POCT glucose device results   Result Value Ref Range    POC Glucose, Blood 217 (H) 65 - 99 mg/dL   POC CoV-2, FLU A/B, RSV by PCR   Result Value Ref Range    POC Influenza A RNA, PCR Negative Negative    POC Influenza B RNA, PCR Negative Negative    POC RSV, by PCR Negative Negative    POC SARS-CoV-2, PCR NotDetected NotDetected   POCT glucose device results   Result Value Ref Range    POC Glucose, Blood 144 (H) 65 - 99 mg/dL     Radiology  CT-HEAD W/O   Final Result      No acute intracranial hemorrhage or CT evidence of territorial infarct.      DX-CHEST-PORTABLE (1 VIEW)   Final Result         1.  No acute cardiopulmonary disease.   2.  Atherosclerosis      [id number] VIEWS   Final Result         1.  Moderate quantity of stool in colon favors changes of constipation, otherwise nonspecific bowel gas pattern        My final assessment includes   1. Acute delirium        2. Alzheimer's dementia with anxiety, unspecified dementia severity, unspecified timing of dementia onset (HCC)        3. Acute dehydration        4. Insomnia, unspecified type            Upon Reevaluation, the patient's condition has: Improved; and will be discharged.    Patient discharged from ED Observation status at 10:45 AM (Time) 9/27/2024  (Date).     Total time spent on this ED Observation discharge encounter is < 30 Minutes    The note accurately reflects work and decisions made by me.  Kobe Franz M.D.  9/27/2024  10:46 AM

## 2024-09-27 NOTE — ED NOTES
Pt pulled out PIV, up to restroom, UA collected and sent. Pt back to bed, on monitor, call light in reach. Bed alarm on.

## 2024-09-27 NOTE — ED NOTES
Pt ambulated to bathroom, no balance issues observed, successful and appropriate walking mobility.

## 2024-09-27 NOTE — ED NOTES
Lactic and BMP sent to lab. New bag of NS started. Pt received warm meal. Pt tolerating meal well. Pt expressed desire to return home. Pt updated on poc and need for CT scan. Pt agreed to plan of care.

## 2024-09-27 NOTE — ED NOTES
Discharge paperwork given to pt, all questions answered, all belongings accounted for, pt went to transport to her home

## 2024-09-27 NOTE — ED NOTES
Bedside report received from off going RN: Bonnie, assumed care of patient.  POC discussed with patient. Call light within reach, all needs addressed at this time.       Fall risk interventions in place: Move the patient closer to the nurse's station, Patient's personal possessions are with in their safe reach, Place fall risk sign on patient's door, and Keep floor surfaces clean and dry (all applicable per [facility] Fall risk assessment), on bed alarm  Continuous monitoring: Pulse Ox or Blood Pressure  IVF/IV medications: Not Applicable   Oxygen: Room Air  Bedside sitter: Not Applicable   Isolation: Not Applicable

## 2024-09-27 NOTE — ED PROVIDER NOTES
ED Provider Note    CHIEF COMPLAINT  Chief Complaint   Patient presents with    Insomnia     BIBA [facility], Pt reports not sleeping for 3 days, vomiting 4 days ago but not currently. Hx of HTN, Alzheimers. [facility] reports FSBG 268.       EXTERNAL RECORDS REVIEWED  Outpatient Notes reviewed office visit progress note dated July 24, 2024 by Dr. Shaikh.  Patient seen in follow-up, on losartan for hypertension.  History of Alzheimer's dementia.    HPI/ROS  LIMITATION TO HISTORY   Select: Altered mental status / Confusion  OUTSIDE HISTORIAN(S):  EMS family related to EMS difficulty sleeping for 3 days, vomiting 4 days ago.  For this reason, reduction was attempted, history of Alzheimer's    Tonya Estes is a 74 y.o. female who presents for evaluation of sleeplessness and confusion.  Patient has history of Alzheimer's disease.  Very limited history, she is oriented only to self.  EMS noted she has not really slept for the last 3 days per family.  She did have a episode of vomiting about 4 days ago but none acutely.  She is a diabetic and has elevated blood sugar radiating to other 68.  Patient herself is very pleasant and though a limited historian notes she has a mild headache to the occipital scalp, no apparent outward trauma.  Denies chest pain, denies abdominal pain, denies extremity pain.  Otherwise appears pleasantly confused.    PAST MEDICAL HISTORY   has a past medical history of Asthma, Diabetes, Heart burn, Hypertension, Hypothyroidism, and Unspecified disorder of thyroid.    SURGICAL HISTORY   has a past surgical history that includes scleral buckling (2/12/2013) and cholecystectomy.    FAMILY HISTORY  Family History   Problem Relation Age of Onset    Prostate cancer Father     Heart Attack Mother     Diabetes Sister        SOCIAL HISTORY  Social History     Tobacco Use    Smoking status: Never    Smokeless tobacco: Never   Vaping Use    Vaping status: Never Used   Substance and Sexual Activity    Alcohol  "use: No     Alcohol/week: 0.0 oz    Drug use: No    Sexual activity: Not on file       CURRENT MEDICATIONS  Home Medications       Reviewed by Meera Chapman R.N. (Registered Nurse) on 09/26/24 at 2050  Med List Status: Partial     Medication Last Dose Status   atorvastatin (LIPITOR) 40 MG Tab  Active   levothyroxine (SYNTHROID) 50 MCG Tab  Active   losartan (COZAAR) 25 MG Tab  Active   potassium chloride SA (KDUR) 20 MEQ Tab CR  Active                  Audit from Redirected Encounters    **Home medications have not yet been reviewed for this encounter**         ALLERGIES  No Known Allergies    PHYSICAL EXAM  VITAL SIGNS: BP (!) 173/83   Pulse 81   Temp 36.4 °C (97.6 °F) (Temporal)   Resp 18   Ht 1.549 m (5' 1\")   Wt 63.5 kg (140 lb)   SpO2 95%   BMI 26.45 kg/m²    General: Alert, no acute distress  Skin: Warm, dry, normal for ethnicity  Head: Normocephalic, atraumatic  Neck: Trachea midline, no tenderness  Eye: PERRL, sclera anicteric, extraocular movements intact without nystagmus  ENMT: Oral mucosa pink and dry  Cardiovascular: Regular rate and rhythm, No murmur, Normal peripheral perfusion  Respiratory: Lungs CTA, respirations are non-labored, breath sounds are equal  Gastrointestinal: non distended  Musculoskeletal: No swelling, no deformity  Neurological: Alert and oriented to self only.  Cranial nerves II through XII are grossly intact, upper and lower extremity strength and sensation 5 x 5 and symmetrical bilaterally.  Lymphatics: No lymphadenopathy  Psychiatric: Cooperative, appropriate mood & affect     EKG/LABS  Results for orders placed or performed during the hospital encounter of 09/26/24   CBC with Differential   Result Value Ref Range    WBC 8.1 4.8 - 10.8 K/uL    RBC 4.32 4.20 - 5.40 M/uL    Hemoglobin 13.2 12.0 - 16.0 g/dL    Hematocrit 36.6 (L) 37.0 - 47.0 %    MCV 84.7 81.4 - 97.8 fL    MCH 30.6 27.0 - 33.0 pg    MCHC 36.1 (H) 32.2 - 35.5 g/dL    RDW 39.5 35.9 - 50.0 fL    Platelet Count " 408 164 - 446 K/uL    MPV 9.1 9.0 - 12.9 fL    Neutrophils-Polys 58.00 44.00 - 72.00 %    Lymphocytes 30.40 22.00 - 41.00 %    Monocytes 9.40 0.00 - 13.40 %    Eosinophils 1.10 0.00 - 6.90 %    Basophils 0.90 0.00 - 1.80 %    Immature Granulocytes 0.20 0.00 - 0.90 %    Nucleated RBC 0.00 0.00 - 0.20 /100 WBC    Neutrophils (Absolute) 4.66 1.82 - 7.42 K/uL    Lymphs (Absolute) 2.45 1.00 - 4.80 K/uL    Monos (Absolute) 0.76 0.00 - 0.85 K/uL    Eos (Absolute) 0.09 0.00 - 0.51 K/uL    Baso (Absolute) 0.07 0.00 - 0.12 K/uL    Immature Granulocytes (abs) 0.02 0.00 - 0.11 K/uL    NRBC (Absolute) 0.00 K/uL   Comp Metabolic Panel   Result Value Ref Range    Sodium 139 135 - 145 mmol/L    Potassium 3.9 3.6 - 5.5 mmol/L    Chloride 103 96 - 112 mmol/L    Co2 19 (L) 20 - 33 mmol/L    Anion Gap 17.0 (H) 7.0 - 16.0    Glucose 226 (H) 65 - 99 mg/dL    Bun 20 8 - 22 mg/dL    Creatinine 0.88 0.50 - 1.40 mg/dL    Calcium 9.4 8.5 - 10.5 mg/dL    Correct Calcium 9.3 8.5 - 10.5 mg/dL    AST(SGOT) 21 12 - 45 U/L    ALT(SGPT) 20 2 - 50 U/L    Alkaline Phosphatase 90 30 - 99 U/L    Total Bilirubin 0.2 0.1 - 1.5 mg/dL    Albumin 4.1 3.2 - 4.9 g/dL    Total Protein 7.4 6.0 - 8.2 g/dL    Globulin 3.3 1.9 - 3.5 g/dL    A-G Ratio 1.2 g/dL   URINALYSIS    Specimen: Urine   Result Value Ref Range    Color Yellow     Character Clear     Specific Gravity 1.025 <1.035    Ph 5.5 5.0 - 8.0    Glucose Negative Negative mg/dL    Ketones Negative Negative mg/dL    Protein Negative Negative mg/dL    Bilirubin Negative Negative    Urobilinogen, Urine 0.2 Negative    Nitrite Negative Negative    Leukocyte Esterase Negative Negative    Occult Blood Negative Negative    Micro Urine Req see below    LACTIC ACID   Result Value Ref Range    Lactic Acid 2.3 (H) 0.5 - 2.0 mmol/L   ESTIMATED GFR   Result Value Ref Range    GFR (CKD-EPI) 68 >60 mL/min/1.73 m 2   LIPASE   Result Value Ref Range    Lipase 78 11 - 82 U/L   POCT glucose device results   Result Value Ref  Range    POC Glucose, Blood 217 (H) 65 - 99 mg/dL   POC CoV-2, FLU A/B, RSV by PCR   Result Value Ref Range    POC Influenza A RNA, PCR Negative Negative    POC Influenza B RNA, PCR Negative Negative    POC RSV, by PCR Negative Negative    POC SARS-CoV-2, PCR NotDetected NotDetected      I have independently interpreted this EKG    RADIOLOGY/PROCEDURES   I have independently interpreted the diagnostic imaging associated with this visit and am waiting the final reading from the radiologist.   My preliminary interpretation is as follows: No infiltrate noted on chest x-ray    Radiologist interpretation:  DX-CHEST-PORTABLE (1 VIEW)   Final Result         1.  No acute cardiopulmonary disease.   2.  Atherosclerosis      [id number] VIEWS   Final Result         1.  Moderate quantity of stool in colon favors changes of constipation, otherwise nonspecific bowel gas pattern          COURSE & MEDICAL DECISION MAKING    ASSESSMENT, COURSE AND PLAN  Care Narrative:  Patient is a very pleasant but demented 74-year-old who presents for evaluation of poor sleep.  She is very poor historian, she endorses a headache on my initial assessment but is feeling better after IV fluids.  Workup is reassuring, mildly elevated lactic acid consistent with dehydration, mild hyperglycemia but no evidence of diabetic ketoacidosis.  No fever, no leukocytosis, no evidence of bacterial infectious source.  This is not consistent with sepsis per SIRS criteria.  Patient I am told however he is the caregiver for her significant other, given her worsening dementia and obvious inability to care for herself she is not a safe discharge at this time.  Social work will be consulted, patient does not meet inpatient criteria, patient will be observed until placement can be arranged in outpatient setting.    ED OBS: Yes; I am placing the patient in to an observation status due to a diagnostic uncertainty as well as therapeutic intensity. Patient placed in  observation status at 9:08 PM, 9/26/2024.     Hydration: Based on the patient's presentation of Dehydration the patient was given IV fluids. IV Hydration was used because oral hydration was not as rapid as required. Upon recheck following hydration, the patient was doing better, heart rate improving, currently 74.     Observation plan is as follows: Patient medicated with acetaminophen 1 g p.o., normal saline 1 L.  Metabolic/septic workup including lipase and lactic acid will be obtained.  Differential diagnosis: Includes but is not restricted to UTI, lower respiratory infection, dehydration, electrolyte derangement, acute delirium, gastritis, pancreatitis, dementia    0001: Patient reassessed, remains pleasantly confused.  She does not remember speaking to me previously.  I suspect acute delirium, thankfully labs are unremarkable and she denies any headache at this time.  Awaiting urinalysis at this time.    0050: Patient last has provided urine sample, this is unremarkable.  Patient clearly is not a safe discharge but does not meet criteria for inpatient management strictly speaking.  Will consult hospitalist, have ordered social work consultation, patient remains in ED observation status at this time.    0055: Spoke with hospitalist Dr. Garcia who concurs patient does not meet inpatient criteria.  Given not a safe discharge she is amenable to consult on the case for medical management.  I have already ordered social work consultation to assist in placement.        Patient Vitals for the past 24 hrs:   BP Temp Temp src Pulse Resp SpO2 Height Weight   09/27/24 0145 (!) 173/83 -- -- 81 -- 95 % -- --   09/27/24 0045 (!) 146/64 -- -- 63 -- 92 % -- --   09/27/24 0000 (!) 159/72 -- -- 84 18 90 % -- --   09/26/24 2304 -- -- -- -- 18 -- -- --   09/26/24 2303 (!) 151/72 -- -- 79 -- 94 % -- --   09/26/24 2233 136/68 -- -- 74 20 96 % -- --   09/26/24 2200 (!) 140/72 -- -- 76 (!) 27 94 % -- --   09/26/24 2115 -- -- -- 77 15  "94 % -- --   09/26/24 2100 (!) 151/78 -- -- 76 (!) 29 94 % -- --   09/26/24 2045 (!) 140/73 36.4 °C (97.6 °F) Temporal 79 18 95 % 1.549 m (5' 1\") 63.5 kg (140 lb)        ADDITIONAL PROBLEMS MANAGED  Dementia with sundowning, dehydration, inability to care for self, failure to thrive    DISPOSITION AND DISCUSSIONS  I have discussed management of the patient with the following physicians and RAHEL's:  Patient endorsed to my partner Dr. Alexandr Lara who will follow the patient's progress while in ED observation status.    Discussion of management with other QHP or appropriate source(s): None     Escalation of care considered, and ultimately not performed:acute inpatient care management, however at this time, the patient is most appropriate for outpatient management    Barriers to care at this time, including but not limited to:  NA .     Decision tools and prescription drugs considered including, but not limited to:  SIRS criteria for sepsis not met .    FINAL DIAGNOSIS  1. Acute delirium    2. Alzheimer's dementia with anxiety, unspecified dementia severity, unspecified timing of dementia onset (HCC)    3. Acute dehydration         Electronically signed by: Raisa Smith M.D., 9/26/2024 9:07 PM      "

## 2024-09-27 NOTE — ED NOTES
Pt doing well.  Pt reports that she has not had any further facial swelling or vomiting episodes.  Pt seen by immunology and has had some labowrk done.  Has more labwork to do.  Will continue to monitor. Denies regular CTX, VB, VD, LOF.  + FM.  Continue PNV.  Labor precautions reviewed.  Pt to RTC in 4 weeks.  Tdap at that visit     Report given to Derrick FLORES

## 2024-09-27 NOTE — ED NOTES
Med Rec completed per patient's home pharmacy ([facility]) and family   Allergies reviewed  No ORAL antibiotics in last 30 days    Patient is not taking anticoagulants

## 2024-09-27 NOTE — ED NOTES
Pt ambulated with steady gait to the restroom and back to her bed, insulin coverage given, meal tray provided

## 2024-09-27 NOTE — ASSESSMENT & PLAN NOTE
History of dementia and is following with PCP outpatient  Does not meet criteria for inpatient at this time  Medication reconciled

## 2024-09-27 NOTE — ED NOTES
Unable to complete med rec at this time  Called pt's family, no answer at this time, message left   Home pharmacy closed at this time

## 2024-09-27 NOTE — DISCHARGE PLANNING
Medical Social Work     JAYCE called the pt daughter in law Kenzie and she reports that they live in [place] but that they have friends and care givers for the pt and her roommate. They also have cameras all over the home when for when the caregivers are not there. Kenzie and her  called [facility] for the pt due to her declining medical condition. Kenzie stated she can have one of the caregivers come  the pt when she is ready for discharge if needed. Kenzie also asked for for dementia resources. JAYCE emailed Kenzie resources to [email or web address] (f. [email or web address]).     JAYCE called Kenzie at 7am and advised her that they are still doing blood work on the pt and the ERP is not sure if we are going to admit the pt or he she will be clear to get home. JAYCE provided Kenzie with the pt SW contact number and with the red pod contact number so she can call and get updates on the pt.     Plan: JAYCE gave report to the day ER JAYCE.

## 2024-09-27 NOTE — ED NOTES
ERP at bedside. Pt updated on POC. Pt resting in bed. Call light within reach. All needs met at this time.

## 2024-09-27 NOTE — DISCHARGE PLANNING
Good afternoon,  This referral has been escalated to a Clinical Supervisor for review in order to determine Home Health appropriateness.  This issue will be resolved as quickly as possible, but for any questions feel free to call us at [phone].  Thank you!

## 2024-09-27 NOTE — DISCHARGE INSTRUCTIONS
For the insomnia you can use over-the-counter melatonin.  Return the emergency department if you have a fever, worsening confusion, or new hallucinations.

## 2024-09-27 NOTE — DISCHARGE PLANNING
ATTN: Case Management  RE: Referral for Home Health    Reason for referral denial: Does not meet criteria per sup               Unfortunately, we are not able to accept this referral for the reason listed above. If further clarity is needed, our Transitional Care Specialists are available to discuss any barriers to service at [phone].      We look forward to collaborating with you in the future,  [facility]

## 2024-09-27 NOTE — DISCHARGE PLANNING
MSW and ERP updated pt's daughter in law Kenzie regarding POC. Pt to be discharged home. Pt's roommate/caregiver is unable to come get pt but will be home to let her in. MSW arranged [facility] transport for [id number]. Cost $96.82. Pt's daughter in law requested HH referral sent to [facility]. ERP put in HH referral. MSW sent choice to [facility]. Bedside RN updated.

## 2024-09-27 NOTE — ED NOTES
Bedside report received from off going RN/tech: Ap Olvera, assumed care of patient.  POC discussed with patient. Call light within reach, all needs addressed at this time.       Fall risk interventions in place: Move the patient closer to the nurse's station, Patient's personal possessions are with in their safe reach, Place socks on patient, Place fall risk sign on patient's door, Keep floor surfaces clean and dry, Accompanied to restroom, and Bed Alarm in use (all applicable per [place] Fall risk assessment)   Continuous monitoring: Cardiac Leads, Pulse Ox, or Blood Pressure  IVF/IV medications: Infusion per MAR (List Med(s)) LR  Oxygen: Room Air  Bedside sitter: Not Applicable   Isolation: Not Applicable

## 2024-09-27 NOTE — DISCHARGE PLANNING
1336  Received Choice Form at: 1116 am  Agency/Facility Name: [facility]  Sent Referral per Choice Form at: 1336 pm

## 2024-10-04 RX ORDER — LOSARTAN POTASSIUM 25 MG/1
25 TABLET ORAL
COMMUNITY
Start: 2024-07-25 | End: 2024-10-09 | Stop reason: SDUPTHER

## 2024-10-07 ENCOUNTER — HOSPITAL ENCOUNTER (OUTPATIENT)
Dept: LAB | Facility: MEDICAL CENTER | Age: 74
End: 2024-10-07
Payer: MEDICARE

## 2024-10-07 ENCOUNTER — APPOINTMENT (OUTPATIENT)
Dept: INTERNAL MEDICINE | Facility: OTHER | Age: 74
End: 2024-10-07
Payer: MEDICARE

## 2024-10-07 VITALS
DIASTOLIC BLOOD PRESSURE: 78 MMHG | BODY MASS INDEX: 27.25 KG/M2 | SYSTOLIC BLOOD PRESSURE: 138 MMHG | HEIGHT: 60 IN | OXYGEN SATURATION: 96 % | WEIGHT: 138.8 LBS | TEMPERATURE: 97.3 F | HEART RATE: 58 BPM

## 2024-10-07 DIAGNOSIS — L30.9 ECZEMA, UNSPECIFIED TYPE: ICD-10-CM

## 2024-10-07 DIAGNOSIS — R94.31 QT PROLONGATION: ICD-10-CM

## 2024-10-07 DIAGNOSIS — E87.6 HYPOKALEMIA: ICD-10-CM

## 2024-10-07 DIAGNOSIS — E11.42 TYPE 2 DIABETES MELLITUS WITH DIABETIC POLYNEUROPATHY, WITHOUT LONG-TERM CURRENT USE OF INSULIN (HCC): ICD-10-CM

## 2024-10-07 DIAGNOSIS — R41.89 COGNITIVE DECLINE: ICD-10-CM

## 2024-10-07 DIAGNOSIS — G30.9 MILD ALZHEIMER'S DEMENTIA WITH PSYCHOTIC DISTURBANCE, UNSPECIFIED TIMING OF DEMENTIA ONSET (HCC): ICD-10-CM

## 2024-10-07 DIAGNOSIS — F02.A2 MILD ALZHEIMER'S DEMENTIA WITH PSYCHOTIC DISTURBANCE, UNSPECIFIED TIMING OF DEMENTIA ONSET (HCC): ICD-10-CM

## 2024-10-07 LAB
ALBUMIN SERPL BCP-MCNC: 4.1 G/DL (ref 3.2–4.9)
ALBUMIN/GLOB SERPL: 1.2 G/DL
ALP SERPL-CCNC: 66 U/L (ref 30–99)
ALT SERPL-CCNC: 18 U/L (ref 2–50)
ANION GAP SERPL CALC-SCNC: 12 MMOL/L (ref 7–16)
AST SERPL-CCNC: 16 U/L (ref 12–45)
BILIRUB SERPL-MCNC: 0.6 MG/DL (ref 0.1–1.5)
BUN SERPL-MCNC: 18 MG/DL (ref 8–22)
CALCIUM ALBUM COR SERPL-MCNC: 9.8 MG/DL (ref 8.5–10.5)
CALCIUM SERPL-MCNC: 9.9 MG/DL (ref 8.5–10.5)
CHLORIDE SERPL-SCNC: 105 MMOL/L (ref 96–112)
CO2 SERPL-SCNC: 23 MMOL/L (ref 20–33)
CREAT SERPL-MCNC: 0.91 MG/DL (ref 0.5–1.4)
GFR SERPLBLD CREATININE-BSD FMLA CKD-EPI: 65 ML/MIN/1.73 M 2
GLOBULIN SER CALC-MCNC: 3.5 G/DL (ref 1.9–3.5)
GLUCOSE SERPL-MCNC: 134 MG/DL (ref 65–99)
POTASSIUM SERPL-SCNC: 3.8 MMOL/L (ref 3.6–5.5)
PROT SERPL-MCNC: 7.6 G/DL (ref 6–8.2)
SODIUM SERPL-SCNC: 140 MMOL/L (ref 135–145)

## 2024-10-07 PROCEDURE — 80053 COMPREHEN METABOLIC PANEL: CPT

## 2024-10-07 PROCEDURE — 36415 COLL VENOUS BLD VENIPUNCTURE: CPT

## 2024-10-07 PROCEDURE — 99214 OFFICE O/P EST MOD 30 MIN: CPT | Mod: GC

## 2024-10-07 RX ORDER — BENZOCAINE/MENTHOL 6 MG-10 MG
1 LOZENGE MUCOUS MEMBRANE 2 TIMES DAILY
Qty: 1 G | Refills: 0 | Status: SHIPPED | OUTPATIENT
Start: 2024-10-07 | End: 2024-10-14

## 2024-10-07 ASSESSMENT — ENCOUNTER SYMPTOMS
EYE REDNESS: 0
EYE PAIN: 0
SEIZURES: 0
FEVER: 0
HEADACHES: 0
INSOMNIA: 0
FOCAL WEAKNESS: 0
CONSTIPATION: 0
BRUISES/BLEEDS EASILY: 0
HEARTBURN: 0
DIARRHEA: 0
WHEEZING: 0
SORE THROAT: 0
MYALGIAS: 0
WEAKNESS: 0
DIZZINESS: 0
PND: 0
PALPITATIONS: 0
DEPRESSION: 0
WEIGHT LOSS: 0
FALLS: 0
ABDOMINAL PAIN: 0
ORTHOPNEA: 0
MEMORY LOSS: 0
BLURRED VISION: 0
TREMORS: 0
SHORTNESS OF BREATH: 0
NERVOUS/ANXIOUS: 0
SINUS PAIN: 0
DOUBLE VISION: 0
HALLUCINATIONS: 0
CHILLS: 0
COUGH: 0

## 2024-10-07 ASSESSMENT — FIBROSIS 4 INDEX: FIB4 SCORE: 0.87

## 2024-10-08 ENCOUNTER — PATIENT OUTREACH (OUTPATIENT)
Dept: HEALTH INFORMATION MANAGEMENT | Facility: OTHER | Age: 74
End: 2024-10-08
Payer: MEDICARE

## 2024-10-08 DIAGNOSIS — E11.42 TYPE 2 DIABETES MELLITUS WITH DIABETIC POLYNEUROPATHY, WITHOUT LONG-TERM CURRENT USE OF INSULIN (HCC): ICD-10-CM

## 2024-10-08 SDOH — ECONOMIC STABILITY: FOOD INSECURITY: WITHIN THE PAST 12 MONTHS, YOU WORRIED THAT YOUR FOOD WOULD RUN OUT BEFORE YOU GOT MONEY TO BUY MORE.: NEVER TRUE

## 2024-10-08 SDOH — ECONOMIC STABILITY: INCOME INSECURITY: IN THE LAST 12 MONTHS, WAS THERE A TIME WHEN YOU WERE NOT ABLE TO PAY THE MORTGAGE OR RENT ON TIME?: NO

## 2024-10-08 SDOH — ECONOMIC STABILITY: FOOD INSECURITY: WITHIN THE PAST 12 MONTHS, THE FOOD YOU BOUGHT JUST DIDN'T LAST AND YOU DIDN'T HAVE MONEY TO GET MORE.: NEVER TRUE

## 2024-10-08 SDOH — ECONOMIC STABILITY: TRANSPORTATION INSECURITY
IN THE PAST 12 MONTHS, HAS LACK OF TRANSPORTATION KEPT YOU FROM MEETINGS, WORK, OR FROM GETTING THINGS NEEDED FOR DAILY LIVING?: NO

## 2024-10-08 SDOH — ECONOMIC STABILITY: TRANSPORTATION INSECURITY
IN THE PAST 12 MONTHS, HAS THE LACK OF TRANSPORTATION KEPT YOU FROM MEDICAL APPOINTMENTS OR FROM GETTING MEDICATIONS?: NO

## 2024-10-08 ASSESSMENT — SOCIAL DETERMINANTS OF HEALTH (SDOH)
HOW OFTEN DO YOU ATTEND CHURCH OR RELIGIOUS SERVICES?: MORE THAN 4 TIMES PER YEAR
WITHIN THE LAST YEAR, HAVE YOU BEEN KICKED, HIT, SLAPPED, OR OTHERWISE PHYSICALLY HURT BY YOUR PARTNER OR EX-PARTNER?: NO
IN A TYPICAL WEEK, HOW MANY TIMES DO YOU TALK ON THE PHONE WITH FAMILY, FRIENDS, OR NEIGHBORS?: MORE THAN THREE TIMES A WEEK
WITHIN THE LAST YEAR, HAVE YOU BEEN HUMILIATED OR EMOTIONALLY ABUSED IN OTHER WAYS BY YOUR PARTNER OR EX-PARTNER?: NO
IN THE PAST 12 MONTHS, HAS THE ELECTRIC, GAS, OIL, OR WATER COMPANY THREATENED TO SHUT OFF SERVICE IN YOUR HOME?: NO
DO YOU BELONG TO ANY CLUBS OR ORGANIZATIONS SUCH AS CHURCH GROUPS UNIONS, FRATERNAL OR ATHLETIC GROUPS, OR SCHOOL GROUPS?: YES
WITHIN THE LAST YEAR, HAVE TO BEEN RAPED OR FORCED TO HAVE ANY KIND OF SEXUAL ACTIVITY BY YOUR PARTNER OR EX-PARTNER?: NO
WITHIN THE LAST YEAR, HAVE YOU BEEN AFRAID OF YOUR PARTNER OR EX-PARTNER?: NO
HOW HARD IS IT FOR YOU TO PAY FOR THE VERY BASICS LIKE FOOD, HOUSING, MEDICAL CARE, AND HEATING?: SOMEWHAT HARD
HOW OFTEN DO YOU GET TOGETHER WITH FRIENDS OR RELATIVES?: ONCE A WEEK
HOW OFTEN DO YOU ATTENT MEETINGS OF THE CLUB OR ORGANIZATION YOU BELONG TO?: MORE THAN 4 TIMES PER YEAR

## 2024-10-08 ASSESSMENT — LIFESTYLE VARIABLES
HOW MANY STANDARD DRINKS CONTAINING ALCOHOL DO YOU HAVE ON A TYPICAL DAY: PATIENT DOES NOT DRINK
AUDIT-C TOTAL SCORE: 0
SKIP TO QUESTIONS 9-10: 1
HOW OFTEN DO YOU HAVE A DRINK CONTAINING ALCOHOL: NEVER
HOW OFTEN DO YOU HAVE SIX OR MORE DRINKS ON ONE OCCASION: NEVER

## 2024-10-08 ASSESSMENT — PATIENT HEALTH QUESTIONNAIRE - PHQ9: CLINICAL INTERPRETATION OF PHQ2 SCORE: 0

## 2024-10-09 ENCOUNTER — TELEPHONE (OUTPATIENT)
Dept: INTERNAL MEDICINE | Facility: OTHER | Age: 74
End: 2024-10-09

## 2024-10-09 ENCOUNTER — NON-PROVIDER VISIT (OUTPATIENT)
Dept: INTERNAL MEDICINE | Facility: OTHER | Age: 74
End: 2024-10-09
Payer: MEDICARE

## 2024-10-09 ENCOUNTER — PATIENT MESSAGE (OUTPATIENT)
Dept: INTERNAL MEDICINE | Facility: OTHER | Age: 74
End: 2024-10-09
Payer: MEDICARE

## 2024-10-09 DIAGNOSIS — R94.31 QT PROLONGATION: ICD-10-CM

## 2024-10-09 LAB — EKG 4674: NORMAL

## 2024-10-10 ENCOUNTER — TELEPHONE (OUTPATIENT)
Dept: INTERNAL MEDICINE | Facility: OTHER | Age: 74
End: 2024-10-10
Payer: MEDICARE

## 2024-10-10 DIAGNOSIS — G47.00 INSOMNIA, UNSPECIFIED TYPE: ICD-10-CM

## 2024-10-10 DIAGNOSIS — R41.89 COGNITIVE DECLINE: ICD-10-CM

## 2024-10-10 DIAGNOSIS — F02.A2 MILD ALZHEIMER'S DEMENTIA WITH PSYCHOTIC DISTURBANCE, UNSPECIFIED TIMING OF DEMENTIA ONSET (HCC): ICD-10-CM

## 2024-10-10 DIAGNOSIS — R45.86 MOOD DISTURBANCE: ICD-10-CM

## 2024-10-10 DIAGNOSIS — G30.9 MILD ALZHEIMER'S DEMENTIA WITH PSYCHOTIC DISTURBANCE, UNSPECIFIED TIMING OF DEMENTIA ONSET (HCC): ICD-10-CM

## 2024-10-11 ENCOUNTER — PATIENT OUTREACH (OUTPATIENT)
Dept: HEALTH INFORMATION MANAGEMENT | Facility: OTHER | Age: 74
End: 2024-10-11
Payer: MEDICARE

## 2024-10-11 RX ORDER — METFORMIN HYDROCHLORIDE 500 MG/1
500 TABLET, EXTENDED RELEASE ORAL DAILY
Qty: 30 TABLET | Refills: 1 | Status: SHIPPED | OUTPATIENT
Start: 2024-10-11 | End: 2025-04-09

## 2024-10-11 RX ORDER — LOSARTAN POTASSIUM 25 MG/1
25 TABLET ORAL
Qty: 30 TABLET | Refills: 1 | Status: SHIPPED | OUTPATIENT
Start: 2024-10-11

## 2024-10-14 ENCOUNTER — PATIENT OUTREACH (OUTPATIENT)
Dept: HEALTH INFORMATION MANAGEMENT | Facility: OTHER | Age: 74
End: 2024-10-14
Payer: MEDICARE

## 2024-10-15 ENCOUNTER — TELEPHONE (OUTPATIENT)
Dept: INTERNAL MEDICINE | Facility: OTHER | Age: 74
End: 2024-10-15
Payer: MEDICARE

## 2024-10-16 ENCOUNTER — HOME HEALTH ADMISSION (OUTPATIENT)
Dept: HOME HEALTH SERVICES | Facility: HOME HEALTHCARE | Age: 74
End: 2024-10-16
Payer: MEDICARE

## 2024-10-16 RX ORDER — TRAZODONE HYDROCHLORIDE 50 MG/1
50 TABLET, FILM COATED ORAL NIGHTLY
Qty: 30 TABLET | Refills: 1 | Status: SHIPPED | OUTPATIENT
Start: 2024-10-16

## 2024-10-20 ENCOUNTER — HOME CARE VISIT (OUTPATIENT)
Dept: HOME HEALTH SERVICES | Facility: HOME HEALTHCARE | Age: 74
End: 2024-10-20
Payer: MEDICARE

## 2024-10-20 VITALS
SYSTOLIC BLOOD PRESSURE: 122 MMHG | RESPIRATION RATE: 16 BRPM | OXYGEN SATURATION: 99 % | HEART RATE: 100 BPM | DIASTOLIC BLOOD PRESSURE: 70 MMHG | TEMPERATURE: 97.6 F

## 2024-10-20 PROCEDURE — 665005 NO-PAY RAP - HOME HEALTH

## 2024-10-20 PROCEDURE — G0299 HHS/HOSPICE OF RN EA 15 MIN: HCPCS

## 2024-10-20 PROCEDURE — 665001 SOC-HOME HEALTH

## 2024-10-20 ASSESSMENT — ENCOUNTER SYMPTOMS
COUGH: 1
LAST BOWEL MOVEMENT: 67133
MENTAL STATUS CHANGE: 0
STOOL FREQUENCY: DAILY
NAUSEA: PT DENIES ANY NAUSEA AT THIS TIME
DESCRIPTION OF MEMORY LOSS: LONG TERM
COUGH CHARACTERISTICS: NON-PRODUCTIVE
DENIES PAIN: 1
POOR JUDGMENT: 1
BOWEL PATTERN NORMAL: 1
VOMITING: PT DENIES ANY EMESIS AT THIS TIME
DIFFICULTY THINKING: 1
DESCRIPTION OF MEMORY LOSS: SHORT TERM

## 2024-10-21 ENCOUNTER — DOCUMENTATION (OUTPATIENT)
Dept: MEDICAL GROUP | Facility: PHYSICIAN GROUP | Age: 74
End: 2024-10-21
Payer: MEDICARE

## 2024-10-24 ENCOUNTER — HOME CARE VISIT (OUTPATIENT)
Dept: HOME HEALTH SERVICES | Facility: HOME HEALTHCARE | Age: 74
End: 2024-10-24
Payer: MEDICARE

## 2024-10-24 PROCEDURE — G0495 RN CARE TRAIN/EDU IN HH: HCPCS

## 2024-10-25 VITALS
OXYGEN SATURATION: 96 % | SYSTOLIC BLOOD PRESSURE: 120 MMHG | HEART RATE: 96 BPM | TEMPERATURE: 98.8 F | DIASTOLIC BLOOD PRESSURE: 56 MMHG | RESPIRATION RATE: 16 BRPM

## 2024-10-25 ASSESSMENT — ENCOUNTER SYMPTOMS
PAIN LOCATION - PAIN SEVERITY: 5/10
PAIN LOCATION - PAIN QUALITY: ACHE
STOOL FREQUENCY: DAILY
PAIN LOCATION: HEADACHES
PAIN LOCATION - RELIEVING FACTORS: TYLENOL
HIGHEST PAIN SEVERITY IN PAST 24 HOURS: 5/10
PAIN: 1
HEADACHES: 1
ARTHRALGIAS: 1
VOMITING: DENIES
SUBJECTIVE PAIN PROGRESSION: UNCHANGED
NAUSEA: DENIES
BOWEL PATTERN NORMAL: 1
PAIN LOCATION - PAIN FREQUENCY: INTERMITTENT
FATIGUES EASILY: 1
MUSCLE WEAKNESS: 1
DEPRESSION: 1
PAIN LOCATION - PAIN DURATION: DAILY
LOWEST PAIN SEVERITY IN PAST 24 HOURS: 3/10
LAST BOWEL MOVEMENT: 67137
PAIN SEVERITY GOAL: 2/10

## 2024-10-25 ASSESSMENT — ACTIVITIES OF DAILY LIVING (ADL)
CURRENT_FUNCTION: STAND BY ASSIST
AMBULATION ASSISTANCE: STAND BY ASSIST

## 2024-10-28 ENCOUNTER — HOME CARE VISIT (OUTPATIENT)
Dept: HOME HEALTH SERVICES | Facility: HOME HEALTHCARE | Age: 74
End: 2024-10-28
Payer: MEDICARE

## 2024-10-28 PROCEDURE — G0495 RN CARE TRAIN/EDU IN HH: HCPCS

## 2024-10-29 VITALS
DIASTOLIC BLOOD PRESSURE: 60 MMHG | SYSTOLIC BLOOD PRESSURE: 120 MMHG | OXYGEN SATURATION: 94 % | RESPIRATION RATE: 18 BRPM | HEART RATE: 87 BPM | TEMPERATURE: 98.1 F

## 2024-10-29 ASSESSMENT — ENCOUNTER SYMPTOMS
PAIN LOCATION - PAIN QUALITY: ACHE
LAST BOWEL MOVEMENT: 67141
PAIN LOCATION - PAIN DURATION: INTERMITTENT
ARTHRALGIAS: 1
FORGETFULNESS: 1
PAIN LOCATION: HEADACHE
NAUSEA: DENIES
PAIN LOCATION - PAIN FREQUENCY: INTERMITTENT
LOWEST PAIN SEVERITY IN PAST 24 HOURS: 1/10
LIMITED RANGE OF MOTION: 1
HIGHEST PAIN SEVERITY IN PAST 24 HOURS: 5/10
PAIN LOCATION - PAIN SEVERITY: 3/10
PAIN: 1
VOMITING: DENIES
FATIGUE: 1
HYPERTENSION: 1
PAIN SEVERITY GOAL: 0/10
FATIGUES EASILY: 1
PAIN LOCATION - EXACERBATING FACTORS: DAILY
DRY SKIN: 1
MUSCLE WEAKNESS: 1
BOWEL PATTERN NORMAL: 1
HEADACHES: 1
SUBJECTIVE PAIN PROGRESSION: GRADUALLY IMPROVING
STOOL FREQUENCY: DAILY
PAIN LOCATION - RELIEVING FACTORS: TYLENOL

## 2024-10-29 ASSESSMENT — ACTIVITIES OF DAILY LIVING (ADL)
CURRENT_FUNCTION: STAND BY ASSIST
AMBULATION ASSISTANCE: STAND BY ASSIST

## 2024-10-30 ENCOUNTER — HOME CARE VISIT (OUTPATIENT)
Dept: HOME HEALTH SERVICES | Facility: HOME HEALTHCARE | Age: 74
End: 2024-10-30
Payer: MEDICARE

## 2024-10-30 ASSESSMENT — ACTIVITIES OF DAILY LIVING (ADL): OASIS_M1830: 03

## 2024-10-31 ENCOUNTER — HOME CARE VISIT (OUTPATIENT)
Dept: HOME HEALTH SERVICES | Facility: HOME HEALTHCARE | Age: 74
End: 2024-10-31
Payer: MEDICARE

## 2024-10-31 VITALS
TEMPERATURE: 97.8 F | RESPIRATION RATE: 16 BRPM | OXYGEN SATURATION: 95 % | SYSTOLIC BLOOD PRESSURE: 130 MMHG | DIASTOLIC BLOOD PRESSURE: 66 MMHG | HEART RATE: 78 BPM

## 2024-10-31 PROCEDURE — G0495 RN CARE TRAIN/EDU IN HH: HCPCS

## 2024-10-31 ASSESSMENT — ENCOUNTER SYMPTOMS
PAIN LOCATION - PAIN SEVERITY: 2/10
MUSCLE WEAKNESS: 1
DIFFICULTY THINKING: 1
LAST BOWEL MOVEMENT: 67143
DIARRHEA: 1
PAIN LOCATION - PAIN DURATION: DAILY
HIGHEST PAIN SEVERITY IN PAST 24 HOURS: 3/10
STOOL FREQUENCY: DAILY
FATIGUE: 1
PAIN LOCATION - PAIN FREQUENCY: CONSTANT
DIZZINESS: 1
FATIGUES EASILY: 1
DEBILITATING PAIN: 1
PAIN LOCATION: HEADACHE
BLURRED VISION: 1
HYPERTENSION: 1
HEADACHES: 1
PAIN: 1
SUBJECTIVE PAIN PROGRESSION: UNCHANGED
VOMITING: DENIES
NAUSEA: DENIES
PAIN LOCATION - RELIEVING FACTORS: TYLENOL
PAIN LOCATION - PAIN QUALITY: ACHE
FORGETFULNESS: 1
ARTHRALGIAS: 1
LOWEST PAIN SEVERITY IN PAST 24 HOURS: 1/10
PAIN SEVERITY GOAL: 0/10

## 2024-10-31 ASSESSMENT — ACTIVITIES OF DAILY LIVING (ADL)
AMBULATION ASSISTANCE: STAND BY ASSIST
CURRENT_FUNCTION: STAND BY ASSIST

## 2024-11-04 ENCOUNTER — HOME CARE VISIT (OUTPATIENT)
Dept: HOME HEALTH SERVICES | Facility: HOME HEALTHCARE | Age: 74
End: 2024-11-04
Payer: MEDICARE

## 2024-11-04 VITALS
HEART RATE: 74 BPM | RESPIRATION RATE: 16 BRPM | DIASTOLIC BLOOD PRESSURE: 70 MMHG | TEMPERATURE: 98.4 F | OXYGEN SATURATION: 96 % | SYSTOLIC BLOOD PRESSURE: 128 MMHG

## 2024-11-04 PROCEDURE — G0495 RN CARE TRAIN/EDU IN HH: HCPCS

## 2024-11-04 ASSESSMENT — ENCOUNTER SYMPTOMS
LAST BOWEL MOVEMENT: 67148
LIMITED RANGE OF MOTION: 1
PAIN SEVERITY GOAL: 0/10
DEBILITATING PAIN: 1
LOWEST PAIN SEVERITY IN PAST 24 HOURS: 0/10
DIFFICULTY THINKING: 1
PAIN LOCATION - PAIN FREQUENCY: INTERMITTENT
FATIGUES EASILY: 1
DRY SKIN: 1
PAIN LOCATION - PAIN QUALITY: ACHE
NAUSEA: DENIES
BOWEL PATTERN NORMAL: 1
SUBJECTIVE PAIN PROGRESSION: GRADUALLY IMPROVING
MUSCLE WEAKNESS: 1
VOMITING: DENIES
HYPERTENSION: 1
STOOL FREQUENCY: DAILY
PAIN LOCATION - PAIN DURATION: DAILY
PAIN LOCATION - PAIN SEVERITY: 2/10
PAIN LOCATION: HEADACHE
DESCRIPTION OF MEMORY LOSS: IMMEDIATE
HEADACHES: 1
PAIN: 1
HIGHEST PAIN SEVERITY IN PAST 24 HOURS: 2/10
DESCRIPTION OF MEMORY LOSS: SHORT TERM
ARTHRALGIAS: 1

## 2024-11-04 ASSESSMENT — ACTIVITIES OF DAILY LIVING (ADL)
CURRENT_FUNCTION: STAND BY ASSIST
AMBULATION ASSISTANCE: STAND BY ASSIST

## 2024-11-05 ENCOUNTER — HOME CARE VISIT (OUTPATIENT)
Dept: HOME HEALTH SERVICES | Facility: HOME HEALTHCARE | Age: 74
End: 2024-11-05
Payer: MEDICARE

## 2024-11-06 ENCOUNTER — HOME CARE VISIT (OUTPATIENT)
Dept: HOME HEALTH SERVICES | Facility: HOME HEALTHCARE | Age: 74
End: 2024-11-06
Payer: MEDICARE

## 2024-11-07 ENCOUNTER — TELEPHONE (OUTPATIENT)
Dept: INTERNAL MEDICINE | Facility: OTHER | Age: 74
End: 2024-11-07
Payer: MEDICARE

## 2024-11-07 ENCOUNTER — HOME CARE VISIT (OUTPATIENT)
Dept: HOME HEALTH SERVICES | Facility: HOME HEALTHCARE | Age: 74
End: 2024-11-07
Payer: MEDICARE

## 2024-11-07 VITALS
DIASTOLIC BLOOD PRESSURE: 64 MMHG | TEMPERATURE: 99 F | HEART RATE: 81 BPM | OXYGEN SATURATION: 95 % | RESPIRATION RATE: 16 BRPM | SYSTOLIC BLOOD PRESSURE: 132 MMHG

## 2024-11-07 PROCEDURE — G0493 RN CARE EA 15 MIN HH/HOSPICE: HCPCS

## 2024-11-07 SDOH — ECONOMIC STABILITY: FOOD INSECURITY: MEALS PER DAY: 2

## 2024-11-07 ASSESSMENT — ENCOUNTER SYMPTOMS
PAIN LOCATION: HEADACHE
APPETITE LEVEL: FAIR
LAST BOWEL MOVEMENT: 67151
VOMITING: DENIES
PAIN LOCATION - PAIN SEVERITY: 1/10
PAIN LOCATION - PAIN FREQUENCY: INTERMITTENT
FATIGUES EASILY: 1
DIZZINESS: 1
NAUSEA: DENIES
DRY SKIN: 1
SUBJECTIVE PAIN PROGRESSION: GRADUALLY IMPROVING
STOOL FREQUENCY: MORE THAN TWICE DAILY
BOWEL PATTERN NORMAL: 1
LOWEST PAIN SEVERITY IN PAST 24 HOURS: 1/10
HIGHEST PAIN SEVERITY IN PAST 24 HOURS: 2/10
HEADACHES: 1
PAIN LOCATION - PAIN QUALITY: ACHE
PAIN SEVERITY GOAL: 0/10
PAIN LOCATION - PAIN DURATION: OCCASIONAL
CHANGE IN APPETITE: UNCHANGED
PAIN: 1

## 2024-11-07 ASSESSMENT — ACTIVITIES OF DAILY LIVING (ADL)
OASIS_M1830: 00
HOME_HEALTH_OASIS: 04

## 2024-11-07 NOTE — TELEPHONE ENCOUNTER
Received a voicemail from Home Care nurse [phone].    She said that Tonya is out of Losartan.  She has been taking more tablets than she was instructed to.  She is having some dementia.  Now she is completely out of Losartan.

## 2024-11-08 ENCOUNTER — HOME CARE VISIT (OUTPATIENT)
Dept: HOME HEALTH SERVICES | Facility: HOME HEALTHCARE | Age: 74
End: 2024-11-08
Payer: MEDICARE

## 2024-11-08 NOTE — DISCHARGE SUMMARY
DISCHARGE NARRATIVE    CURRENT LEVEL OF FUNCTION:   Ambulation and Mobility: Independent  Patient Equipment: no assistive device for ambulation:   Transferring: Supervised  Bathing: Independent  Dressing: Independent  Special equipment used: none    MEDICATION MANAGEMENT:  reminded by another person  Medication issues:   In the last 24 hours did the patient wear oxygen: no.  In the last 24 hours, when is the patient dyspneic or noticeably   Short of Breath : patient is not short of breath .    DISCHARGE/TRANSITION PLAN   Individualized goals during this episode of Home Health care were related to: Assess Medication Response and Effectiveness and Diabetes and Insulin Education.    .    Patient has met goals related to: Assess Medication Response and Effectiveness and Diabetes and Insulin Education.       Goals not met and why Patient medications are not being taken correctly.  MD and family have been made aware, different stratagies have been used without success to improve this.      Patient is ready for discharge on 11/7/24 with the following services in place: MSW from [place]  is working with family to get placement for this patient.

## 2024-11-08 NOTE — CASE COMMUNICATION
Quality Review for [place] [employer] by FATIMAH Piedra RN on  November 8, 2024    Edits completed by FATIMAH Piedra RN:  1. Changed  to 2 per response to [id number] I

## 2024-11-08 NOTE — Clinical Note
I agree with the following edits.  Melvi Garcia RN 11/9/24 @ 1140  ----- Message -----  From: Gin Piedra R.N.  Sent: 11/8/2024   6:04 AM PST  To: Melvi Garcia R.N.      Quality Review for [place] OASIS by FATIMAH Piedra RN on  November 8, 2024    Edits completed by FATIMAH Piedra RN:  1. Changed  to 2 per response to [id number] I

## 2024-11-18 NOTE — TELEPHONE ENCOUNTER
Patient's son and daughter-n-law are picking her up this weekend and bringing her to [place] to where they live to get her back on track.  She took all her meds for 90 days in 1 month instead if following the directions.  She hasn't had any meds the past two weeks because of that.  There goal is to get her back on track in [place], and then bring her back to [place].  They will book an appt when she gets back in town. The daughter n law is asking if any blood work can be ordered for her next appt?

## 2024-11-25 ENCOUNTER — DOCUMENTATION (OUTPATIENT)
Dept: NEUROLOGY | Facility: MEDICAL CENTER | Age: 74
End: 2024-11-25
Payer: MEDICARE

## 2024-11-25 DIAGNOSIS — F03.A0 MILD DEMENTIA, UNSPECIFIED DEMENTIA TYPE, UNSPECIFIED WHETHER BEHAVIORAL, PSYCHOTIC, OR MOOD DISTURBANCE OR ANXIETY (HCC): ICD-10-CM

## 2024-11-28 ENCOUNTER — DOCUMENTATION (OUTPATIENT)
Dept: NEUROLOGY | Facility: MEDICAL CENTER | Age: 74
End: 2024-11-28
Payer: MEDICARE

## 2024-11-28 DIAGNOSIS — F41.9 ANXIETY: ICD-10-CM

## 2024-11-28 RX ORDER — ESCITALOPRAM OXALATE 10 MG/1
TABLET ORAL
Qty: 60 TABLET | Refills: 5 | Status: SHIPPED | OUTPATIENT
Start: 2024-11-28 | End: 2025-10-27

## 2024-12-27 ENCOUNTER — TELEPHONE (OUTPATIENT)
Dept: NEUROLOGY | Facility: MEDICAL CENTER | Age: 74
End: 2024-12-27
Payer: MEDICARE

## 2024-12-30 ENCOUNTER — APPOINTMENT (OUTPATIENT)
Dept: INTERNAL MEDICINE | Facility: OTHER | Age: 74
End: 2024-12-30
Payer: MEDICARE

## 2025-02-25 ENCOUNTER — DOCUMENTATION (OUTPATIENT)
Dept: NEUROLOGY | Facility: MEDICAL CENTER | Age: 75
End: 2025-02-25
Payer: MEDICARE

## 2025-02-25 DIAGNOSIS — G30.1 MILD LATE ONSET ALZHEIMER'S DEMENTIA WITHOUT BEHAVIORAL DISTURBANCE, PSYCHOTIC DISTURBANCE, MOOD DISTURBANCE, OR ANXIETY (HCC): ICD-10-CM

## 2025-02-25 DIAGNOSIS — F02.A0 MILD LATE ONSET ALZHEIMER'S DEMENTIA WITHOUT BEHAVIORAL DISTURBANCE, PSYCHOTIC DISTURBANCE, MOOD DISTURBANCE, OR ANXIETY (HCC): ICD-10-CM

## 2025-02-26 NOTE — Clinical Note
REFERRAL APPROVAL NOTICE         Sent on February 26, 2025                   Tonyamillie Estes  [address]  [address]  [place] [address]                   Dear Ms. Estes,    After a careful review of the medical information and benefit coverage, [facility] has processed your referral. See below for additional details.    If applicable, you must be actively enrolled with your insurance for coverage of the authorized service. If you have any questions regarding your coverage, please contact your insurance directly.    REFERRAL INFORMATION   Referral #:  [id number]  Referred-To Provider    Referred-By Provider:  Neurology    Amari Michelle M.D.   [facility]      [address] [place] [address] [address]  [place] [address]  [phone] [address]  [place] [address]  [phone]    Referral Start Date:  02/25/2025  Referral End Date:   02/25/2026             SCHEDULING  If you do not already have an appointment, please call [phone] to make an appointment.     MORE INFORMATION  If you do not already have a [facility] account, sign up at: [email or web address]  You can access your medical information, make appointments, see lab results, billing information, and more.  If you have questions regarding this referral, please contact  the [facility] Referrals department at:             [phone]. Monday - Friday 8:00AM - 5:00PM.     Sincerely,    [facility]

## 2025-03-21 ENCOUNTER — APPOINTMENT (OUTPATIENT)
Dept: NEUROLOGY | Facility: MEDICAL CENTER | Age: 75
End: 2025-03-21
Attending: PSYCHIATRY & NEUROLOGY
Payer: MEDICARE